# Patient Record
Sex: FEMALE | Race: WHITE | HISPANIC OR LATINO | Employment: OTHER | URBAN - METROPOLITAN AREA
[De-identification: names, ages, dates, MRNs, and addresses within clinical notes are randomized per-mention and may not be internally consistent; named-entity substitution may affect disease eponyms.]

---

## 2017-07-01 ENCOUNTER — HOSPITAL ENCOUNTER (EMERGENCY)
Facility: HOSPITAL | Age: 77
Discharge: HOME | End: 2017-07-01
Attending: EMERGENCY MEDICINE
Payer: MEDICARE

## 2017-07-01 VITALS
RESPIRATION RATE: 16 BRPM | DIASTOLIC BLOOD PRESSURE: 83 MMHG | OXYGEN SATURATION: 99 % | HEIGHT: 66 IN | HEART RATE: 62 BPM | SYSTOLIC BLOOD PRESSURE: 185 MMHG | WEIGHT: 242 LBS | TEMPERATURE: 99 F | BODY MASS INDEX: 38.89 KG/M2

## 2017-07-01 DIAGNOSIS — L29.9 ITCHING: ICD-10-CM

## 2017-07-01 DIAGNOSIS — L30.9 DERMATITIS: Primary | ICD-10-CM

## 2017-07-01 LAB
ALBUMIN SERPL BCP-MCNC: 3.5 G/DL
ALP SERPL-CCNC: 62 U/L
ALT SERPL W/O P-5'-P-CCNC: 21 U/L
ANION GAP SERPL CALC-SCNC: 8 MMOL/L
AST SERPL-CCNC: 22 U/L
BASOPHILS # BLD AUTO: 0.04 K/UL
BASOPHILS NFR BLD: 0.6 %
BILIRUB SERPL-MCNC: 0.4 MG/DL
BUN SERPL-MCNC: 11 MG/DL
CALCIUM SERPL-MCNC: 9.1 MG/DL
CHLORIDE SERPL-SCNC: 101 MMOL/L
CO2 SERPL-SCNC: 30 MMOL/L
CREAT SERPL-MCNC: 0.8 MG/DL
DIFFERENTIAL METHOD: NORMAL
EOSINOPHIL # BLD AUTO: 0.1 K/UL
EOSINOPHIL NFR BLD: 1.6 %
ERYTHROCYTE [DISTWIDTH] IN BLOOD BY AUTOMATED COUNT: 13.2 %
EST. GFR  (AFRICAN AMERICAN): >60 ML/MIN/1.73 M^2
EST. GFR  (NON AFRICAN AMERICAN): >60 ML/MIN/1.73 M^2
GLUCOSE SERPL-MCNC: 92 MG/DL
HCT VFR BLD AUTO: 38.6 %
HGB BLD-MCNC: 12.5 G/DL
LYMPHOCYTES # BLD AUTO: 2.2 K/UL
LYMPHOCYTES NFR BLD: 34.7 %
MCH RBC QN AUTO: 27.5 PG
MCHC RBC AUTO-ENTMCNC: 32.4 %
MCV RBC AUTO: 85 FL
MONOCYTES # BLD AUTO: 0.4 K/UL
MONOCYTES NFR BLD: 6 %
NEUTROPHILS # BLD AUTO: 3.6 K/UL
NEUTROPHILS NFR BLD: 57.1 %
PLATELET # BLD AUTO: 278 K/UL
PMV BLD AUTO: 10.4 FL
POTASSIUM SERPL-SCNC: 4.3 MMOL/L
PROT SERPL-MCNC: 7.2 G/DL
RBC # BLD AUTO: 4.54 M/UL
SODIUM SERPL-SCNC: 139 MMOL/L
WBC # BLD AUTO: 6.29 K/UL

## 2017-07-01 PROCEDURE — 25000003 PHARM REV CODE 250: Performed by: EMERGENCY MEDICINE

## 2017-07-01 PROCEDURE — 80053 COMPREHEN METABOLIC PANEL: CPT

## 2017-07-01 PROCEDURE — 63600175 PHARM REV CODE 636 W HCPCS: Performed by: EMERGENCY MEDICINE

## 2017-07-01 PROCEDURE — 99284 EMERGENCY DEPT VISIT MOD MDM: CPT

## 2017-07-01 PROCEDURE — 85025 COMPLETE CBC W/AUTO DIFF WBC: CPT

## 2017-07-01 RX ORDER — DIPHENHYDRAMINE HCL 25 MG
25 CAPSULE ORAL
Status: COMPLETED | OUTPATIENT
Start: 2017-07-01 | End: 2017-07-01

## 2017-07-01 RX ORDER — PREDNISONE 20 MG/1
40 TABLET ORAL
Status: COMPLETED | OUTPATIENT
Start: 2017-07-01 | End: 2017-07-01

## 2017-07-01 RX ORDER — HYDROXYZINE HYDROCHLORIDE 25 MG/1
25 TABLET, FILM COATED ORAL EVERY 8 HOURS PRN
Qty: 10 TABLET | Refills: 0 | Status: ON HOLD | OUTPATIENT
Start: 2017-07-01 | End: 2022-03-09 | Stop reason: CLARIF

## 2017-07-01 RX ORDER — PREDNISONE 20 MG/1
40 TABLET ORAL DAILY
Qty: 8 TABLET | Refills: 0 | Status: SHIPPED | OUTPATIENT
Start: 2017-07-01 | End: 2017-07-05

## 2017-07-01 RX ORDER — LOTEPREDNOL ETABONATE 2 MG/ML
1 SUSPENSION/ DROPS OPHTHALMIC 4 TIMES DAILY
Status: ON HOLD | COMMUNITY
End: 2022-03-09 | Stop reason: CLARIF

## 2017-07-01 RX ORDER — LORATADINE 10 MG/1
10 TABLET ORAL DAILY
Qty: 15 TABLET | Refills: 0 | OUTPATIENT
Start: 2017-07-01 | End: 2022-01-12

## 2017-07-01 RX ADMIN — DIPHENHYDRAMINE HYDROCHLORIDE 25 MG: 25 CAPSULE ORAL at 11:07

## 2017-07-01 RX ADMIN — PREDNISONE 40 MG: 20 TABLET ORAL at 11:07

## 2017-07-01 NOTE — DISCHARGE INSTRUCTIONS
Please follow-up with your PCP next week.  Return immediately for new or worsening symptoms such as difficulty breathing, swelling in your mouth/ throat.

## 2017-07-01 NOTE — ED TRIAGE NOTES
"Pt arrived via personal transportation from home. CC of itching. Pt stated "I get really itchy and it makes me hot." pt presents with generalized itching to body. Pt reports getting really itchy and hot, pt also stated that she gets red, watery eyes. Pt denies N/V/F/D/SOB. NAD at this time.  "

## 2017-07-01 NOTE — ED PROVIDER NOTES
"Encounter Date: 7/1/2017    SCRIBE #1 NOTE: I, Barbara Mendestorin, am scribing for, and in the presence of,  Mc Gates MD. I have scribed the following portions of the note - Other sections scribed: ROS and HPI.       History     Chief Complaint   Patient presents with    Itching     [place]ian speaking only. "my eyes are red and burning, I keep itching all over my body, pain to my body. My body is hot but I don't have fever." No new meds, no new foods. No SOB.       CC: Itching  HPI: This 76 y.o. female with a past medical history of Chronic back pain; High cholesterol; Hypertension; and Spinal stenosis, presents to the ED complaining of itching all over her body, mainly to her sternal/xyphoid area with associated erythema for last several days. No associated swelling. No new medication intake or new food or new detergent or soap use is reported. She denies fever, chills, SOB or any other associated sx. No prior similar episodes.      The history is provided by the patient. A  was used ([place]ian speaking patient).     Review of patient's allergies indicates:  No Known Allergies  Past Medical History:   Diagnosis Date    Chronic back pain     High cholesterol     Hypertension     Spinal stenosis      Past Surgical History:   Procedure Laterality Date    TUBAL LIGATION      Uterine suspension       Family History   Problem Relation Age of Onset    Breast cancer Neg Hx     Colon cancer Neg Hx     Ovarian cancer Neg Hx      Social History   Substance Use Topics    Smoking status: Never Smoker    Smokeless tobacco: Never Used    Alcohol use No     Review of Systems   Constitutional: Negative for fever.   HENT: Negative for congestion.    Eyes: Negative for visual disturbance.   Respiratory: Negative for cough and shortness of breath.    Cardiovascular: Negative for chest pain.   Gastrointestinal: Negative for abdominal pain, diarrhea, nausea and vomiting.   Genitourinary: Negative for " "dysuria.   Musculoskeletal: Negative for back pain.   Skin: Positive for color change (erythema to xyphoid region). Negative for rash.        (+) "itch all over"   Neurological: Negative for headaches.       Physical Exam     Initial Vitals [07/01/17 1105]   BP Pulse Resp Temp SpO2   (!) 183/82 73 16 98.9 °F (37.2 °C) 100 %      MAP       115.67         Physical Exam    Nursing note and vitals reviewed.  Constitutional: She appears well-developed and well-nourished. She is not diaphoretic. No distress.   HENT:   Head: Normocephalic and atraumatic.   Right Ear: External ear normal.   Left Ear: External ear normal.   Nose: Nose normal.   Mouth/Throat: Oropharynx is clear and moist.    Scalp appears normal.   Eyes: Conjunctivae and EOM are normal. Pupils are equal, round, and reactive to light. Right eye exhibits no discharge. Left eye exhibits no discharge. No scleral icterus.   Neck: Normal range of motion. Neck supple.   Cardiovascular: Normal rate, regular rhythm, normal heart sounds and intact distal pulses.   No murmur heard.  Pulmonary/Chest: Breath sounds normal. No respiratory distress. She has no wheezes. She has no rhonchi. She has no rales.   Abdominal: Soft. Bowel sounds are normal. She exhibits no distension and no mass. There is no tenderness. There is no rebound and no guarding.   Musculoskeletal: Normal range of motion. She exhibits no edema or tenderness.   Neurological: She is alert and oriented to person, place, and time. She has normal strength. No cranial nerve deficit or sensory deficit.   Skin: Skin is warm and dry. No rash noted. There is erythema. No pallor.   Small area of nonspecific erythema near the xiphoid process.   Psychiatric: She has a normal mood and affect. Her behavior is normal. Judgment and thought content normal.         ED Course   Procedures  Labs Reviewed   COMPREHENSIVE METABOLIC PANEL - Abnormal; Notable for the following:        Result Value    CO2 30 (*)     All other " components within normal limits   CBC W/ AUTO DIFFERENTIAL             Medical Decision Making:   ED Management:  This is the emergent evaluation of a 76-year-old female presents the emergency department complaining of itching all over her skin.Differential diagnosis at the time of initial evaluation included, but was not limited to:  Histamine reaction, contact dermatitis, medication reaction, environmental exposure causing ALLERGY. there is a small area of erythema near the xiphoid process.  Otherwise, no significant evidence of skin lesions.  No mucous membrane lesions.  The patient appears well.  No evidence of head lice, or signs of scabies or other infestation.  Patient feeling better after Benadryl.  I will treat this patient with prednisone, Claritin, hydroxyzine.  Unclear what is causing her symptoms but her daughter states that she normally takes Claritin or ALLERGIES but has not been taking it recently.  There is no evidence to suggest anaphylaxis or Booth-Roddy syndrome.  I believe the patient is safe and stable for discharge.  I advised her to follow-up with her PCP and return for any new or worsening symptoms such as shortness of breath or oral swelling.            Scribe Attestation:   Scribe #1: I performed the above scribed service and the documentation accurately describes the services I performed. I attest to the accuracy of the note.    Attending Attestation:           Physician Attestation for Scribe:  Physician Attestation Statement for Scribe #1: I, Mc Gates MD, reviewed documentation, as scribed by Barbara Meredith in my presence, and it is both accurate and complete.                 ED Course     Clinical Impression:   The primary encounter diagnosis was Dermatitis. A diagnosis of Itching was also pertinent to this visit.                           Mc Gates Jr., MD  07/01/17 8868

## 2017-09-01 ENCOUNTER — HOSPITAL ENCOUNTER (EMERGENCY)
Facility: HOSPITAL | Age: 77
Discharge: HOME | End: 2017-09-01
Attending: EMERGENCY MEDICINE
Payer: COMMERCIAL

## 2017-09-01 VITALS
RESPIRATION RATE: 18 BRPM | TEMPERATURE: 99 F | SYSTOLIC BLOOD PRESSURE: 158 MMHG | OXYGEN SATURATION: 99 % | BODY MASS INDEX: 38.14 KG/M2 | DIASTOLIC BLOOD PRESSURE: 80 MMHG | HEART RATE: 62 BPM | HEIGHT: 67 IN | WEIGHT: 243 LBS

## 2017-09-01 DIAGNOSIS — S20.212A RIB CONTUSION, LEFT, INITIAL ENCOUNTER: Primary | ICD-10-CM

## 2017-09-01 DIAGNOSIS — R07.81 RIB PAIN: ICD-10-CM

## 2017-09-01 PROCEDURE — 99284 EMERGENCY DEPT VISIT MOD MDM: CPT | Mod: 25

## 2017-09-01 PROCEDURE — 63600175 PHARM REV CODE 636 W HCPCS: Performed by: EMERGENCY MEDICINE

## 2017-09-01 PROCEDURE — 25000003 PHARM REV CODE 250: Performed by: EMERGENCY MEDICINE

## 2017-09-01 PROCEDURE — 96372 THER/PROPH/DIAG INJ SC/IM: CPT

## 2017-09-01 RX ORDER — HYDROCODONE BITARTRATE AND ACETAMINOPHEN 5; 325 MG/1; MG/1
2 TABLET ORAL
Status: COMPLETED | OUTPATIENT
Start: 2017-09-01 | End: 2017-09-01

## 2017-09-01 RX ORDER — HYDROCODONE BITARTRATE AND ACETAMINOPHEN 5; 325 MG/1; MG/1
1 TABLET ORAL EVERY 6 HOURS PRN
Qty: 8 TABLET | Refills: 0 | Status: SHIPPED | OUTPATIENT
Start: 2017-09-01 | End: 2017-09-03 | Stop reason: SDUPTHER

## 2017-09-01 RX ORDER — NAPROXEN 500 MG/1
500 TABLET ORAL 2 TIMES DAILY WITH MEALS
Qty: 20 TABLET | Refills: 0 | Status: ON HOLD | OUTPATIENT
Start: 2017-09-01 | End: 2022-03-09 | Stop reason: CLARIF

## 2017-09-01 RX ORDER — LIDOCAINE 50 MG/G
1 PATCH TOPICAL DAILY
Qty: 20 PATCH | Refills: 0 | Status: ON HOLD | OUTPATIENT
Start: 2017-09-01 | End: 2022-03-09 | Stop reason: CLARIF

## 2017-09-01 RX ORDER — KETOROLAC TROMETHAMINE 30 MG/ML
15 INJECTION, SOLUTION INTRAMUSCULAR; INTRAVENOUS
Status: COMPLETED | OUTPATIENT
Start: 2017-09-01 | End: 2017-09-01

## 2017-09-01 RX ADMIN — HYDROCODONE BITARTRATE AND ACETAMINOPHEN 2 TABLET: 5; 325 TABLET ORAL at 12:09

## 2017-09-01 RX ADMIN — KETOROLAC TROMETHAMINE 15 MG: 30 INJECTION, SOLUTION INTRAMUSCULAR at 12:09

## 2017-09-01 NOTE — ED PROVIDER NOTES
Encounter Date: 9/1/2017    SCRIBE #1 NOTE: I, Elle East, am scribing for, and in the presence of,  Mc Gates MD. I have scribed the following portions of the note - Other sections scribed: HPI and ROS.       History     Chief Complaint   Patient presents with    Fall     trip and fall leaving the dentist, fell head first. no LOC    Rib Injury     left side       Chief Complaint: Fall; Rib Injury    HPI: This 76 y.o. Female with HTN, spinal stenosis and high cholesterol presents to the ED status post a fall at the dentist office this afternoon. Patient reports tripping and falling over a rug while walking into facility. Patient fell forwards and hit the floor. She reports hitting left side rib cage during incident. Pain is constant, severe and worse with palpation. So also reports associated SOB. There's no attempted treatment. She denies LOC, head trauma, headache, chest pain or abdominal pain.       The history is provided by the patient and a caregiver. A  was used.     Review of patient's allergies indicates:  No Known Allergies  Past Medical History:   Diagnosis Date    Chronic back pain     High cholesterol     Hypertension     Spinal stenosis      Past Surgical History:   Procedure Laterality Date    TUBAL LIGATION      Uterine suspension       Family History   Problem Relation Age of Onset    Breast cancer Neg Hx     Colon cancer Neg Hx     Ovarian cancer Neg Hx      Social History   Substance Use Topics    Smoking status: Never Smoker    Smokeless tobacco: Never Used    Alcohol use No     Review of Systems   Constitutional: Negative for chills and fever.   HENT: Negative for ear pain and sore throat.    Eyes: Negative for pain.   Respiratory: Positive for shortness of breath. Negative for cough.    Cardiovascular: Negative for chest pain.   Gastrointestinal: Negative for abdominal pain, diarrhea, nausea and vomiting.   Genitourinary: Negative for dysuria and  hematuria.   Musculoskeletal: Negative for myalgias (arm or leg pain).        (+) rib pain   Skin: Negative for rash.   Neurological: Negative for syncope and headaches.       Physical Exam     Initial Vitals [09/01/17 1218]   BP Pulse Resp Temp SpO2   (!) 180/77 70 20 98.2 °F (36.8 °C) 98 %      MAP       111.33         Physical Exam    Nursing note and vitals reviewed.  Constitutional: She appears well-developed and well-nourished. She is not diaphoretic. No distress.   HENT:   Head: Normocephalic and atraumatic.   Right Ear: External ear normal.   Left Ear: External ear normal.   Nose: Nose normal.   Mouth/Throat: Oropharynx is clear and moist.   Eyes: Conjunctivae and EOM are normal. Pupils are equal, round, and reactive to light. Right eye exhibits no discharge. Left eye exhibits no discharge. No scleral icterus.   Neck: Normal range of motion. Neck supple.   Cardiovascular: Normal rate, regular rhythm, normal heart sounds and intact distal pulses.   No murmur heard.  Pulmonary/Chest: Breath sounds normal. No respiratory distress. She has no wheezes. She has no rhonchi. She has no rales. She exhibits tenderness.   No respiratory distress.  Breath sounds clear bilaterally.  There is pronounced chest wall tenderness without evidence of crepitus or flail chest in the posterior inferior left-sided ribs.   Abdominal: Soft. Bowel sounds are normal. She exhibits no distension and no mass. There is no tenderness. There is no rebound and no guarding.   Abdomen soft, nontender.   Musculoskeletal: Normal range of motion. She exhibits no edema or tenderness.   Neurological: She is alert and oriented to person, place, and time. She has normal strength. No cranial nerve deficit or sensory deficit.   Skin: Skin is warm and dry. No rash noted. No erythema. No pallor.   Psychiatric: She has a normal mood and affect. Her behavior is normal. Judgment and thought content normal.         ED Course   Procedures  Labs Reviewed - No  data to display       X-Rays:   Independently Interpreted Readings:   Other Readings:  Chest x-ray reviewed and interpreted by me shows no evidence of pulmonary edema, pneumothorax, or consolidations/ infiltrates.  X-ray of the left ribs reveals no evidence of rib fracture.    Medical Decision Making:   ED Management:  This is the emergent evaluation of a 76-year-old female presents the emergency department with left-sided rib pain after mechanical fall this prior to arrival.Differential diagnosis at the time of initial evaluation included, but was not limited to: Rib fracture, pneumothorax, pulmonary contusion.  I considered intra-abdominal and splenic injury.  However, patient's abdomen is soft and nontender.  Lungs sounds are clear bilaterally.  No evidence of rib fractures on chest x-ray.  No evidence of pulmonary contusions or pneumothorax.  Patient is safe and stable to go home with lidocaine patches, NSAIDs, and Norco for breakthrough pain.  She was advised to follow with PCP and return for new or worsening symptoms such as fever, shortness of breath.            Scribe Attestation:   Scribe #1: I performed the above scribed service and the documentation accurately describes the services I performed. I attest to the accuracy of the note.    Attending Attestation:           Physician Attestation for Scribe:  Physician Attestation Statement for Scribe #1: I, Mc Gates MD, reviewed documentation, as scribed by Elle East in my presence, and it is both accurate and complete.                 ED Course      Clinical Impression:   The primary encounter diagnosis was Rib contusion, left, initial encounter. A diagnosis of Rib pain was also pertinent to this visit.                           Mc Gates Jr., MD  09/01/17 1640

## 2017-09-01 NOTE — ED TRIAGE NOTES
"Pt presents to ED via EMS states that she was at dentist office after procedure she tripped on the carpet and fell head first.  Pt denies hitting head or side.  State that she just fell to the floor.  States that Lt side "ribs" hurt.  States that when deep breath more painful.   Pt rates pain as 10.  "

## 2017-09-03 ENCOUNTER — HOSPITAL ENCOUNTER (EMERGENCY)
Facility: HOSPITAL | Age: 77
Discharge: HOME | End: 2017-09-03
Attending: EMERGENCY MEDICINE
Payer: COMMERCIAL

## 2017-09-03 VITALS
TEMPERATURE: 98 F | RESPIRATION RATE: 18 BRPM | OXYGEN SATURATION: 97 % | HEART RATE: 52 BPM | SYSTOLIC BLOOD PRESSURE: 180 MMHG | WEIGHT: 243 LBS | BODY MASS INDEX: 38.14 KG/M2 | DIASTOLIC BLOOD PRESSURE: 84 MMHG | HEIGHT: 67 IN

## 2017-09-03 DIAGNOSIS — S22.32XA CLOSED FRACTURE OF ONE RIB OF LEFT SIDE, INITIAL ENCOUNTER: Primary | ICD-10-CM

## 2017-09-03 DIAGNOSIS — I10 HYPERTENSION, UNCONTROLLED: ICD-10-CM

## 2017-09-03 DIAGNOSIS — R07.9 CHEST PAIN: ICD-10-CM

## 2017-09-03 LAB
ALBUMIN SERPL BCP-MCNC: 3.5 G/DL
ALP SERPL-CCNC: 62 U/L
ALT SERPL W/O P-5'-P-CCNC: 14 U/L
ANION GAP SERPL CALC-SCNC: 9 MMOL/L
AST SERPL-CCNC: 17 U/L
BASOPHILS # BLD AUTO: 0.04 K/UL
BASOPHILS NFR BLD: 0.5 %
BILIRUB SERPL-MCNC: 0.4 MG/DL
BUN SERPL-MCNC: 15 MG/DL
CALCIUM SERPL-MCNC: 9.4 MG/DL
CHLORIDE SERPL-SCNC: 106 MMOL/L
CO2 SERPL-SCNC: 26 MMOL/L
CREAT SERPL-MCNC: 0.7 MG/DL
DIFFERENTIAL METHOD: ABNORMAL
EOSINOPHIL # BLD AUTO: 0.1 K/UL
EOSINOPHIL NFR BLD: 1.1 %
ERYTHROCYTE [DISTWIDTH] IN BLOOD BY AUTOMATED COUNT: 13.8 %
EST. GFR  (AFRICAN AMERICAN): >60 ML/MIN/1.73 M^2
EST. GFR  (NON AFRICAN AMERICAN): >60 ML/MIN/1.73 M^2
GLUCOSE SERPL-MCNC: 104 MG/DL
HCT VFR BLD AUTO: 37.3 %
HGB BLD-MCNC: 11.9 G/DL
LYMPHOCYTES # BLD AUTO: 2.2 K/UL
LYMPHOCYTES NFR BLD: 26.1 %
MCH RBC QN AUTO: 27.2 PG
MCHC RBC AUTO-ENTMCNC: 31.9 G/DL
MCV RBC AUTO: 85 FL
MONOCYTES # BLD AUTO: 0.5 K/UL
MONOCYTES NFR BLD: 5.4 %
NEUTROPHILS # BLD AUTO: 5.5 K/UL
NEUTROPHILS NFR BLD: 66.4 %
PLATELET # BLD AUTO: 312 K/UL
PMV BLD AUTO: 9.9 FL
POTASSIUM SERPL-SCNC: 4.2 MMOL/L
PROT SERPL-MCNC: 7.1 G/DL
RBC # BLD AUTO: 4.38 M/UL
SODIUM SERPL-SCNC: 141 MMOL/L
WBC # BLD AUTO: 8.31 K/UL

## 2017-09-03 PROCEDURE — 80053 COMPREHEN METABOLIC PANEL: CPT

## 2017-09-03 PROCEDURE — 99284 EMERGENCY DEPT VISIT MOD MDM: CPT | Mod: 25

## 2017-09-03 PROCEDURE — 96375 TX/PRO/DX INJ NEW DRUG ADDON: CPT

## 2017-09-03 PROCEDURE — 85025 COMPLETE CBC W/AUTO DIFF WBC: CPT

## 2017-09-03 PROCEDURE — 96374 THER/PROPH/DIAG INJ IV PUSH: CPT

## 2017-09-03 PROCEDURE — 63600175 PHARM REV CODE 636 W HCPCS: Performed by: EMERGENCY MEDICINE

## 2017-09-03 RX ORDER — ONDANSETRON 2 MG/ML
4 INJECTION INTRAMUSCULAR; INTRAVENOUS
Status: COMPLETED | OUTPATIENT
Start: 2017-09-03 | End: 2017-09-03

## 2017-09-03 RX ORDER — HYDROMORPHONE HYDROCHLORIDE 2 MG/ML
1 INJECTION, SOLUTION INTRAMUSCULAR; INTRAVENOUS; SUBCUTANEOUS
Status: COMPLETED | OUTPATIENT
Start: 2017-09-03 | End: 2017-09-03

## 2017-09-03 RX ORDER — OXYCODONE AND ACETAMINOPHEN 7.5; 325 MG/1; MG/1
1 TABLET ORAL EVERY 6 HOURS PRN
Qty: 30 TABLET | Refills: 0 | Status: ON HOLD | OUTPATIENT
Start: 2017-09-03 | End: 2022-03-09 | Stop reason: CLARIF

## 2017-09-03 RX ADMIN — ONDANSETRON 4 MG: 2 INJECTION INTRAMUSCULAR; INTRAVENOUS at 09:09

## 2017-09-03 RX ADMIN — HYDROMORPHONE HYDROCHLORIDE 1 MG: 2 INJECTION INTRAMUSCULAR; INTRAVENOUS; SUBCUTANEOUS at 09:09

## 2017-09-03 NOTE — ED PROVIDER NOTES
Encounter Date: 9/3/2017    SCRIBE #1 NOTE: I, Vivian Segovia, am scribing for, and in the presence of,  Asael Franklin MD. I have scribed the following portions of the note - Other sections scribed: HPI and ROS.       History     Chief Complaint   Patient presents with    Fall     fall x 2 days ago.  remains with complaints of pain to left ribs with movement or deep breaths.       CC: Rib Pain    HPI: This 76 y.o. Female with PMHx of HTN, chronic back pain, spinal stenosis, high cholesterol, and thyroid disease presents to the ED c/o severe left rib pain with movement and/or deep breaths secondary to a fall that occurred 2 days ago. Pt was in this ED approximately 2 days ago following the fall, and she was evaluated, treated, prescribed hydrocodone for treatment, and discharged. Pt denies any other associated symptoms.       The history is provided by the patient. The history is limited by a language barrier. A  was used ([employer] (Italian) ).     Review of patient's allergies indicates:  No Known Allergies  Past Medical History:   Diagnosis Date    Chronic back pain     High cholesterol     Hypertension     Spinal stenosis     Thyroid disease      Past Surgical History:   Procedure Laterality Date    EYE SURGERY      TUBAL LIGATION      Uterine suspension       Family History   Problem Relation Age of Onset    Breast cancer Neg Hx     Colon cancer Neg Hx     Ovarian cancer Neg Hx      Social History   Substance Use Topics    Smoking status: Never Smoker    Smokeless tobacco: Never Used    Alcohol use No     Review of Systems   Constitutional: Negative for chills, diaphoresis and fever.   HENT: Negative for ear pain and sore throat.    Eyes: Negative for pain.   Respiratory: Negative for cough and shortness of breath.    Cardiovascular:        (+) Severe left rib pain.    Gastrointestinal: Negative for abdominal pain, diarrhea, nausea and vomiting.   Genitourinary: Negative for  dysuria.   Musculoskeletal: Negative for neck pain.   Skin: Negative for rash.   Neurological: Negative for headaches.       Physical Exam     Initial Vitals [09/03/17 0755]   BP Pulse Resp Temp SpO2   (!) 198/89 61 18 98.7 °F (37.1 °C) 98 %      MAP       125.33         Physical Exam    Nursing note and vitals reviewed.  Constitutional: She appears well-developed and well-nourished.   Eyes: EOM are normal. Pupils are equal, round, and reactive to light.   Neck: Normal range of motion. Neck supple. No thyromegaly present. No JVD present.   Cardiovascular: Normal rate, regular rhythm, normal heart sounds and intact distal pulses. Exam reveals no gallop and no friction rub.    No murmur heard.  Pulmonary/Chest: Breath sounds normal. No respiratory distress. She has no wheezes. She has no rhonchi. She has no rales. She exhibits tenderness.   Abdominal: Soft. Bowel sounds are normal. She exhibits no distension and no mass. There is no tenderness. There is no rebound and no guarding.   Musculoskeletal: Normal range of motion. She exhibits no edema or tenderness.   Neurological: She is alert and oriented to person, place, and time. She has normal strength. She displays normal reflexes. No cranial nerve deficit or sensory deficit.   Skin: Skin is warm and dry. Capillary refill takes less than 2 seconds.         ED Course   Procedures  Labs Reviewed   CBC W/ AUTO DIFFERENTIAL - Abnormal; Notable for the following:        Result Value    Hemoglobin 11.9 (*)     MCHC 31.9 (*)     All other components within normal limits   COMPREHENSIVE METABOLIC PANEL          X-Rays:   Independently Interpreted Readings:   Other Readings:  CT chest shows 5th rib fracture. Small pleural effusion and some atelectasis. No PTX.   Patient's pain is controlled at this time. Incentive spirometer performed and will be given for home use. Will prescribe pain medication. Follow up PCP for Blood pressure recheck and further pain control.               Scribe Attestation:   Scribe #1: I performed the above scribed service and the documentation accurately describes the services I performed. I attest to the accuracy of the note.    Attending Attestation:           Physician Attestation for Scribe:  Physician Attestation Statement for Scribe #1: I, Asael Franklin MD, reviewed documentation, as scribed by Vivian Segovia in my presence, and it is both accurate and complete.                 ED Course      Clinical Impression:   The primary encounter diagnosis was Closed fracture of one rib of left side, initial encounter. Diagnoses of Chest pain and Hypertension, uncontrolled were also pertinent to this visit.                           Asael Franklin MD  09/03/17 1129

## 2017-09-03 NOTE — ED TRIAGE NOTES
Pt comes in after trip and fall at dentist office on Friday. She tripped over a rug.  Pt complains of left shoulder pain, left chest pain radiating to back. Pt also complains of trouble sleeping x 2 days due to the pain. She was seen here after the fall Friday. Pt denies hitting head or LOC. Pt denies abdominal pain, nausea, or vomiting. Pt also complains of chronic leg pain. Pt also complains of pain when breathing.

## 2022-01-12 ENCOUNTER — HOSPITAL ENCOUNTER (EMERGENCY)
Facility: HOSPITAL | Age: 82
Discharge: HOME | End: 2022-01-12
Attending: EMERGENCY MEDICINE
Payer: MEDICARE

## 2022-01-12 VITALS
OXYGEN SATURATION: 99 % | HEIGHT: 66 IN | HEART RATE: 91 BPM | RESPIRATION RATE: 18 BRPM | BODY MASS INDEX: 40.18 KG/M2 | DIASTOLIC BLOOD PRESSURE: 76 MMHG | TEMPERATURE: 98 F | SYSTOLIC BLOOD PRESSURE: 118 MMHG | WEIGHT: 250 LBS

## 2022-01-12 DIAGNOSIS — R05.9 COUGH: ICD-10-CM

## 2022-01-12 DIAGNOSIS — L28.2 PRURITIC RASH: Primary | ICD-10-CM

## 2022-01-12 LAB
SARS-COV-2 RNA RESP QL NAA+PROBE: NOT DETECTED
SARS-COV-2- CYCLE NUMBER: NORMAL

## 2022-01-12 PROCEDURE — U0003 INFECTIOUS AGENT DETECTION BY NUCLEIC ACID (DNA OR RNA); SEVERE ACUTE RESPIRATORY SYNDROME CORONAVIRUS 2 (SARS-COV-2) (CORONAVIRUS DISEASE [COVID-19]), AMPLIFIED PROBE TECHNIQUE, MAKING USE OF HIGH THROUGHPUT TECHNOLOGIES AS DESCRIBED BY CMS-2020-01-R: HCPCS | Performed by: NURSE PRACTITIONER

## 2022-01-12 PROCEDURE — 99284 EMERGENCY DEPT VISIT MOD MDM: CPT | Mod: 25

## 2022-01-12 PROCEDURE — U0005 INFEC AGEN DETEC AMPLI PROBE: HCPCS | Performed by: NURSE PRACTITIONER

## 2022-01-12 RX ORDER — CETIRIZINE HYDROCHLORIDE 10 MG/1
10 TABLET ORAL DAILY
Qty: 30 TABLET | Refills: 1 | Status: ON HOLD | OUTPATIENT
Start: 2022-01-12 | End: 2022-03-09 | Stop reason: CLARIF

## 2022-01-12 RX ORDER — HYDROCORTISONE 25 MG/ML
LOTION TOPICAL 2 TIMES DAILY
Qty: 60 ML | Refills: 0 | Status: ON HOLD | OUTPATIENT
Start: 2022-01-12 | End: 2022-03-09 | Stop reason: CLARIF

## 2022-01-12 NOTE — DISCHARGE INSTRUCTIONS
Take zyrtec once daily to help reduce sneezing and coughing.  You can take zyrtec before bedtime.    Use the hydrocortisone cream on the rash on your face.  Use up to twice daily, as needed.    Follow-up with your primary care doctor for further evaluation management of your symptoms in 1 week.    Return to the emergency room for any new or worsening symptoms or concerns.

## 2022-01-12 NOTE — ED PROVIDER NOTES
"Encounter Date: 1/12/2022    SCRIBE #1 NOTE: I, Farnaz Munoz, am scribing for, and in the presence of,  Edel Lawler NP. I have scribed the following portions of the note - Other sections scribed: HPI, ROS,PE.       History     Chief Complaint   Patient presents with    Cough     Patient reports a cough x 1 month, dry, itchy skin on her face x "a long time". Denies chest pain or sob at this time. Denies fevers, chills, vomiting, diarrhea.    dry skin     Amy Schreiber is a 81 y.o. female who presents to the ED today with a complaint of a productive cough for 1 month. The patient states that her cough mostly happens at night and feels pain under her breast and in her chest when coughing so much (reports no pain now). She reports associated rash on her face and intermittent sneezing. She denies ear pain, sore throat, nausea, vomiting, diarrhea, congestion, chest pain, abdominal pain, or other associated symptoms. She reports taking Tessalon pearls for cough prescribed from her PCP prior to arrival with no relief. She also reports being prescribed 2.5% hydrocortisone for her rash that gave her relief but ran out. She notes that after she ran out of hydrocortisone she was prescribed Clotrimazole and had no relief. She reports seeing her PCP last month but only got blood work done. No other complaints at this time.    The history is provided by the patient. The history is limited by a language barrier. A  was used.     Review of patient's allergies indicates:  No Known Allergies  Past Medical History:   Diagnosis Date    Arthritis     Chronic back pain     High cholesterol     Hypertension     Spinal stenosis     Thyroid disease      Past Surgical History:   Procedure Laterality Date    EYE SURGERY      TUBAL LIGATION      Uterine suspension       Family History   Problem Relation Age of Onset    Breast cancer Neg Hx     Colon cancer Neg Hx     Ovarian cancer Neg Hx      Social " History     Tobacco Use    Smoking status: Never Smoker    Smokeless tobacco: Never Used   Substance Use Topics    Alcohol use: No    Drug use: No     Review of Systems   Constitutional: Negative for chills and fever.   HENT: Positive for sneezing (intermittent). Negative for congestion, ear pain and sore throat.    Eyes: Negative for visual disturbance.   Respiratory: Positive for cough (productive). Negative for shortness of breath.    Cardiovascular: Negative for chest pain.   Gastrointestinal: Negative for abdominal distention, abdominal pain, diarrhea, nausea and vomiting.   Genitourinary: Negative for dysuria.   Musculoskeletal: Negative for neck pain.   Skin: Positive for rash (face).   Allergic/Immunologic: Negative for immunocompromised state.   Neurological: Negative for headaches.   Psychiatric/Behavioral: Negative for dysphoric mood.       Physical Exam     Initial Vitals [01/12/22 0918]   BP Pulse Resp Temp SpO2   118/76 91 18 98 °F (36.7 °C) 99 %      MAP       --         Physical Exam    Nursing note and vitals reviewed.  Constitutional: Vital signs are normal. She appears well-developed and well-nourished. She is not diaphoretic. She is active and cooperative.  Non-toxic appearance. No distress.   HENT:   Mouth/Throat: Uvula is midline, oropharynx is clear and moist and mucous membranes are normal. No trismus in the jaw. No uvula swelling. No oropharyngeal exudate, posterior oropharyngeal edema or posterior oropharyngeal erythema.   Unremarkable.   Eyes: Conjunctivae and EOM are normal. Pupils are equal, round, and reactive to light.   Neck: Neck supple.   Normal range of motion.   Full passive range of motion without pain.     Cardiovascular: Normal heart sounds.   Pulmonary/Chest: Breath sounds normal. No respiratory distress. She has no decreased breath sounds. She has no wheezes. She has no rhonchi. She has no rales. She exhibits no tenderness.   Lungs clear bilaterally.   Musculoskeletal:          General: Normal range of motion.      Cervical back: Full passive range of motion without pain, normal range of motion and neck supple.     Neurological: She is alert and oriented to person, place, and time. She has normal strength.   Skin: Skin is warm and dry. Rash noted.   Papular erythematous rash scattered over face with large concentration over chin and forehead.   Psychiatric: She has a normal mood and affect.         ED Course   Procedures  Labs Reviewed   SARS-COV-2 (COVID-19) QUALITATIVE PCR          Imaging Results          X-Ray Chest AP Portable (Final result)  Result time 01/12/22 10:38:14    Final result by Paul Young Jr., MD (01/12/22 10:38:14)                 Impression:      Cardiomegaly and atherosclerosis.  Evidence of previous granulomatous disease      Electronically signed by: Paul Michel Jr  Date:    01/12/2022  Time:    10:38             Narrative:    EXAMINATION:  XR CHEST AP PORTABLE    CLINICAL HISTORY:  Cough, unspecified    TECHNIQUE:  Single frontal view of the chest was performed.    COMPARISON:  09/01/2017    FINDINGS:  Cardiomediastinal silhouetteis enlarged.  There is tortuosity and atherosclerosis of the thoracic aorta.    There is a calcified granuloma in the left mid lung.  Calcified left hilar lymph nodes also suspected.  Lungs grossly clear within limitations of portable technique noting significant underpenetration due to body habitus.    Pleura, diaphragm, and thoracic cage grossly unremarkable.                              X-Rays:   Independently Interpreted Readings:   Chest X-Ray: No infiltrates.  No acute abnormalities.     Medications - No data to display  Medical Decision Making:   Clinical Tests:   Radiological Study: Ordered and Reviewed  ED Management:  This is an urgent evaluation of an 81-year-old female that presents to the emergency room complaining of a cough for 1 month.  Patient reports associated sneezing; symptoms are worse at night.   She reports her cough is productive.  She denies fevers, night sweats, weight loss, or other systemic symptoms.  Patient's primary care doctor prescribed Tessalon Perles without improvement.  The patient is pleasant and well-appearing in no apparent distress.  She did not cough at all during my exam.  HEENT is benign.  Lungs clear to auscultation in all fields.  O2 saturation is % on room air.  Chest x-ray today is negative for infiltrates or focal consolidations.  Viral URI is possible, though given her clinical presentation I consider less likely.  A COVID-19 swab is pending.  I am not concerned for PE, pneumonia, hypoxia.  I do not believe the patient warrants further workup at this time.  She also complains of a pruritic rash on her face.  The main distribution of the rash is on her forehead and chin, though it is also on her cheeks.  I wonder if this is an allergic response?  Will represcribed hydrocortisone cream that previously helped with her rash.  I also will prescribe Zyrtec for possible allergic etiology to her cough.  To follow-up with her primary care doctor for further evaluation and management of her symptoms in 1 week.  Return precautions were given for any new or worsening symptoms or concerns.          Scribe Attestation:   Scribe #1: I performed the above scribed service and the documentation accurately describes the services I performed. I attest to the accuracy of the note.                 Clinical Impression:   Final diagnoses:  [R05.9] Cough  [L28.2] Pruritic rash (Primary)          ED Disposition Condition    Discharge Stable        ED Prescriptions     Medication Sig Dispense Start Date End Date Auth. Provider    cetirizine (ZYRTEC) 10 MG tablet Take 1 tablet (10 mg total) by mouth once daily. 30 tablet 1/12/2022 3/13/2022 Edel Lawler NP    hydrocortisone 2.5 % lotion Apply topically 2 (two) times daily. 60 mL 1/12/2022  Edel Lawler NP        Follow-up Information     Follow up With  Specialties Details Why Contact Info    Kurt Mcdaniels MD Family Medicine Schedule an appointment as soon as possible for a visit in 1 week For re-evaluation of symptoms in 1 week [address]  [place] [address]  [phone]      [facility] Emergency Dept Emergency Medicine  As needed, If symptoms worsen [address] [place] [address] [phone]         I, Edel Lawler, personally performed the services described in this documentation. All medical record entries made by the scribe were at my direction and in my presence. I have reviewed the chart and agree that the record reflects my personal performance and is accurate and complete.     Edel Lawler, SACHI  01/12/22 0804

## 2022-01-12 NOTE — ED TRIAGE NOTES
Pt reports to the ED with C/O productive cough x1 month that is worse at night. Pt also reports chest wall tightness with the cough. Pt also reports rash around her lips and chin. Pt denies any other cold symptoms. NAD noted. ED workup in progress.

## 2022-03-09 ENCOUNTER — HOSPITAL ENCOUNTER (INPATIENT)
Facility: HOSPITAL | Age: 82
LOS: 1 days | Discharge: HOME | DRG: 309 | End: 2022-03-10
Attending: EMERGENCY MEDICINE | Admitting: EMERGENCY MEDICINE
Payer: MEDICARE

## 2022-03-09 DIAGNOSIS — I48.92 ATRIAL FLUTTER WITH RAPID VENTRICULAR RESPONSE: Primary | ICD-10-CM

## 2022-03-09 DIAGNOSIS — I48.92 ATRIAL FLUTTER, UNSPECIFIED TYPE: ICD-10-CM

## 2022-03-09 DIAGNOSIS — I48.92 ATRIAL FLUTTER: ICD-10-CM

## 2022-03-09 DIAGNOSIS — R07.9 CHEST PAIN: ICD-10-CM

## 2022-03-09 PROBLEM — I70.0 AORTIC ATHEROSCLEROSIS: Status: ACTIVE | Noted: 2022-03-09

## 2022-03-09 LAB
ALBUMIN SERPL BCP-MCNC: 3.6 G/DL (ref 3.5–5.2)
ALP SERPL-CCNC: 69 U/L (ref 55–135)
ALT SERPL W/O P-5'-P-CCNC: 22 U/L (ref 10–44)
ANION GAP SERPL CALC-SCNC: 8 MMOL/L (ref 8–16)
AORTIC ROOT ANNULUS: 2.84 CM
AORTIC VALVE CUSP SEPERATION: 1.85 CM
AST SERPL-CCNC: 19 U/L (ref 10–40)
AV INDEX (PROSTH): 0.61
AV MEAN GRADIENT: 4 MMHG
AV PEAK GRADIENT: 7 MMHG
AV VALVE AREA: 2.41 CM2
AV VELOCITY RATIO: 0.57
BASOPHILS # BLD AUTO: 0.04 K/UL (ref 0–0.2)
BASOPHILS NFR BLD: 0.4 % (ref 0–1.9)
BILIRUB SERPL-MCNC: 0.5 MG/DL (ref 0.1–1)
BNP SERPL-MCNC: 63 PG/ML (ref 0–99)
BSA FOR ECHO PROCEDURE: 2.23 M2
BUN SERPL-MCNC: 13 MG/DL (ref 8–23)
CALCIUM SERPL-MCNC: 9.2 MG/DL (ref 8.7–10.5)
CHLORIDE SERPL-SCNC: 101 MMOL/L (ref 95–110)
CHOLEST SERPL-MCNC: 189 MG/DL (ref 120–199)
CHOLEST/HDLC SERPL: 5 {RATIO} (ref 2–5)
CO2 SERPL-SCNC: 29 MMOL/L (ref 23–29)
CREAT SERPL-MCNC: 0.8 MG/DL (ref 0.5–1.4)
CRP SERPL-MCNC: 5.1 MG/L (ref 0–8.2)
CTP QC/QA: YES
CTP QC/QA: YES
CV ECHO LV RWT: 0.49 CM
D DIMER PPP IA.FEU-MCNC: 0.69 MG/L FEU
DIFFERENTIAL METHOD: ABNORMAL
DOP CALC AO PEAK VEL: 1.31 M/S
DOP CALC AO VTI: 26.5 CM
DOP CALC LVOT AREA: 4 CM2
DOP CALC LVOT DIAMETER: 2.25 CM
DOP CALC LVOT PEAK VEL: 0.75 M/S
DOP CALC LVOT STROKE VOLUME: 63.74 CM3
DOP CALCLVOT PEAK VEL VTI: 16.04 CM
E WAVE DECELERATION TIME: 192.69 MSEC
E/A RATIO: 1.87
ECHO LV POSTERIOR WALL: 1.14 CM (ref 0.6–1.1)
EJECTION FRACTION: 50 %
EOSINOPHIL # BLD AUTO: 0.1 K/UL (ref 0–0.5)
EOSINOPHIL NFR BLD: 0.9 % (ref 0–8)
ERYTHROCYTE [DISTWIDTH] IN BLOOD BY AUTOMATED COUNT: 13.8 % (ref 11.5–14.5)
EST. GFR  (AFRICAN AMERICAN): >60 ML/MIN/1.73 M^2
EST. GFR  (NON AFRICAN AMERICAN): >60 ML/MIN/1.73 M^2
FRACTIONAL SHORTENING: 36 % (ref 28–44)
GLUCOSE SERPL-MCNC: 98 MG/DL (ref 70–110)
HCT VFR BLD AUTO: 42.9 % (ref 37–48.5)
HDLC SERPL-MCNC: 38 MG/DL (ref 40–75)
HDLC SERPL: 20.1 % (ref 20–50)
HGB BLD-MCNC: 13.2 G/DL (ref 12–16)
IMM GRANULOCYTES # BLD AUTO: 0.04 K/UL (ref 0–0.04)
IMM GRANULOCYTES NFR BLD AUTO: 0.4 % (ref 0–0.5)
INR PPP: 1 (ref 0.8–1.2)
INTERVENTRICULAR SEPTUM: 1.09 CM (ref 0.6–1.1)
IVRT: 173.01 MSEC
LA MAJOR: 6.16 CM
LA MINOR: 5.88 CM
LA WIDTH: 4.08 CM
LACTATE SERPL-SCNC: 1.3 MMOL/L (ref 0.5–2.2)
LDLC SERPL CALC-MCNC: 120.8 MG/DL (ref 63–159)
LEFT ATRIUM SIZE: 4.99 CM
LEFT ATRIUM VOLUME INDEX: 49.6 ML/M2
LEFT ATRIUM VOLUME: 104.12 CM3
LEFT INTERNAL DIMENSION IN SYSTOLE: 3.02 CM (ref 2.1–4)
LEFT VENTRICLE DIASTOLIC VOLUME INDEX: 48.76 ML/M2
LEFT VENTRICLE DIASTOLIC VOLUME: 102.39 ML
LEFT VENTRICLE MASS INDEX: 91 G/M2
LEFT VENTRICLE SYSTOLIC VOLUME INDEX: 16.9 ML/M2
LEFT VENTRICLE SYSTOLIC VOLUME: 35.5 ML
LEFT VENTRICULAR INTERNAL DIMENSION IN DIASTOLE: 4.7 CM (ref 3.5–6)
LEFT VENTRICULAR MASS: 191.12 G
LV SEPTAL E/E' RATIO: 51.5 M/S
LYMPHOCYTES # BLD AUTO: 2.9 K/UL (ref 1–4.8)
LYMPHOCYTES NFR BLD: 31.7 % (ref 18–48)
MAGNESIUM SERPL-MCNC: 2 MG/DL (ref 1.6–2.6)
MCH RBC QN AUTO: 26.9 PG (ref 27–31)
MCHC RBC AUTO-ENTMCNC: 30.8 G/DL (ref 32–36)
MCV RBC AUTO: 87 FL (ref 82–98)
MONOCYTES # BLD AUTO: 0.6 K/UL (ref 0.3–1)
MONOCYTES NFR BLD: 7 % (ref 4–15)
MV PEAK A VEL: 0.55 M/S
MV PEAK E VEL: 1.03 M/S
MV STENOSIS PRESSURE HALF TIME: 55.88 MS
MV VALVE AREA P 1/2 METHOD: 3.94 CM2
NEUTROPHILS # BLD AUTO: 5.4 K/UL (ref 1.8–7.7)
NEUTROPHILS NFR BLD: 59.6 % (ref 38–73)
NONHDLC SERPL-MCNC: 151 MG/DL
NRBC BLD-RTO: 0 /100 WBC
PISA TR MAX VEL: 1.59 M/S
PLATELET # BLD AUTO: 299 K/UL (ref 150–450)
PMV BLD AUTO: 10.1 FL (ref 9.2–12.9)
POC MOLECULAR INFLUENZA A AGN: NEGATIVE
POC MOLECULAR INFLUENZA B AGN: NEGATIVE
POTASSIUM SERPL-SCNC: 4.6 MMOL/L (ref 3.5–5.1)
PROCALCITONIN SERPL IA-MCNC: 0.02 NG/ML
PROT SERPL-MCNC: 7.4 G/DL (ref 6–8.4)
PROTHROMBIN TIME: 10.4 SEC (ref 9–12.5)
PV PEAK VELOCITY: 1.09 CM/S
RA MAJOR: 5.74 CM
RA PRESSURE: 8 MMHG
RA WIDTH: 4.01 CM
RBC # BLD AUTO: 4.91 M/UL (ref 4–5.4)
RIGHT VENTRICULAR END-DIASTOLIC DIMENSION: 4.79 CM
RV TISSUE DOPPLER FREE WALL SYSTOLIC VELOCITY 1 (APICAL 4 CHAMBER VIEW): 10.62 CM/S
SARS-COV-2 RDRP RESP QL NAA+PROBE: NEGATIVE
SINUS: 2.99 CM
SODIUM SERPL-SCNC: 138 MMOL/L (ref 136–145)
STJ: 2.42 CM
TDI SEPTAL: 0.02 M/S
TR MAX PG: 10 MMHG
TRICUSPID ANNULAR PLANE SYSTOLIC EXCURSION: 2.08 CM
TRIGL SERPL-MCNC: 151 MG/DL (ref 30–150)
TROPONIN I SERPL DL<=0.01 NG/ML-MCNC: 0.01 NG/ML (ref 0–0.03)
TSH SERPL DL<=0.005 MIU/L-ACNC: 2.44 UIU/ML (ref 0.4–4)
TV REST PULMONARY ARTERY PRESSURE: 18 MMHG
WBC # BLD AUTO: 9.04 K/UL (ref 3.9–12.7)

## 2022-03-09 PROCEDURE — 83880 ASSAY OF NATRIURETIC PEPTIDE: CPT | Performed by: PHYSICIAN ASSISTANT

## 2022-03-09 PROCEDURE — 93010 ELECTROCARDIOGRAM REPORT: CPT | Mod: ,,, | Performed by: INTERNAL MEDICINE

## 2022-03-09 PROCEDURE — 80053 COMPREHEN METABOLIC PANEL: CPT | Performed by: PHYSICIAN ASSISTANT

## 2022-03-09 PROCEDURE — 86140 C-REACTIVE PROTEIN: CPT | Performed by: PHYSICIAN ASSISTANT

## 2022-03-09 PROCEDURE — 99285 EMERGENCY DEPT VISIT HI MDM: CPT | Mod: 25

## 2022-03-09 PROCEDURE — 84443 ASSAY THYROID STIM HORMONE: CPT | Performed by: PHYSICIAN ASSISTANT

## 2022-03-09 PROCEDURE — 85379 FIBRIN DEGRADATION QUANT: CPT | Performed by: PHYSICIAN ASSISTANT

## 2022-03-09 PROCEDURE — 84484 ASSAY OF TROPONIN QUANT: CPT | Performed by: PHYSICIAN ASSISTANT

## 2022-03-09 PROCEDURE — 93010 EKG 12-LEAD: ICD-10-PCS | Mod: ,,, | Performed by: INTERNAL MEDICINE

## 2022-03-09 PROCEDURE — 99291 PR CRITICAL CARE, E/M 30-74 MINUTES: ICD-10-PCS | Mod: ,,, | Performed by: INTERNAL MEDICINE

## 2022-03-09 PROCEDURE — 93005 ELECTROCARDIOGRAM TRACING: CPT

## 2022-03-09 PROCEDURE — 99291 CRITICAL CARE FIRST HOUR: CPT | Mod: ,,, | Performed by: INTERNAL MEDICINE

## 2022-03-09 PROCEDURE — 25000003 PHARM REV CODE 250: Performed by: INTERNAL MEDICINE

## 2022-03-09 PROCEDURE — 85025 COMPLETE CBC W/AUTO DIFF WBC: CPT | Performed by: PHYSICIAN ASSISTANT

## 2022-03-09 PROCEDURE — 83735 ASSAY OF MAGNESIUM: CPT | Performed by: PHYSICIAN ASSISTANT

## 2022-03-09 PROCEDURE — 25000003 PHARM REV CODE 250: Performed by: HOSPITALIST

## 2022-03-09 PROCEDURE — U0002 COVID-19 LAB TEST NON-CDC: HCPCS | Performed by: PHYSICIAN ASSISTANT

## 2022-03-09 PROCEDURE — 20000000 HC ICU ROOM

## 2022-03-09 PROCEDURE — 25000003 PHARM REV CODE 250: Performed by: EMERGENCY MEDICINE

## 2022-03-09 PROCEDURE — 80061 LIPID PANEL: CPT | Performed by: PHYSICIAN ASSISTANT

## 2022-03-09 PROCEDURE — 84145 PROCALCITONIN (PCT): CPT | Performed by: PHYSICIAN ASSISTANT

## 2022-03-09 PROCEDURE — 83605 ASSAY OF LACTIC ACID: CPT | Performed by: PHYSICIAN ASSISTANT

## 2022-03-09 PROCEDURE — A4216 STERILE WATER/SALINE, 10 ML: HCPCS | Performed by: HOSPITALIST

## 2022-03-09 PROCEDURE — 85610 PROTHROMBIN TIME: CPT | Performed by: PHYSICIAN ASSISTANT

## 2022-03-09 RX ORDER — LANOLIN ALCOHOL/MO/W.PET/CERES
800 CREAM (GRAM) TOPICAL
Status: DISCONTINUED | OUTPATIENT
Start: 2022-03-09 | End: 2022-03-10 | Stop reason: HOSPADM

## 2022-03-09 RX ORDER — IBUPROFEN 200 MG
16 TABLET ORAL
Status: DISCONTINUED | OUTPATIENT
Start: 2022-03-09 | End: 2022-03-10 | Stop reason: HOSPADM

## 2022-03-09 RX ORDER — TALC
6 POWDER (GRAM) TOPICAL NIGHTLY PRN
Status: DISCONTINUED | OUTPATIENT
Start: 2022-03-09 | End: 2022-03-10 | Stop reason: HOSPADM

## 2022-03-09 RX ORDER — IBUPROFEN 200 MG
24 TABLET ORAL
Status: DISCONTINUED | OUTPATIENT
Start: 2022-03-09 | End: 2022-03-10 | Stop reason: HOSPADM

## 2022-03-09 RX ORDER — ACETAMINOPHEN 325 MG/1
650 TABLET ORAL EVERY 4 HOURS PRN
Status: DISCONTINUED | OUTPATIENT
Start: 2022-03-09 | End: 2022-03-10 | Stop reason: HOSPADM

## 2022-03-09 RX ORDER — GLUCAGON 1 MG
1 KIT INJECTION
Status: DISCONTINUED | OUTPATIENT
Start: 2022-03-09 | End: 2022-03-10 | Stop reason: HOSPADM

## 2022-03-09 RX ORDER — SODIUM,POTASSIUM PHOSPHATES 280-250MG
2 POWDER IN PACKET (EA) ORAL
Status: DISCONTINUED | OUTPATIENT
Start: 2022-03-09 | End: 2022-03-10 | Stop reason: HOSPADM

## 2022-03-09 RX ORDER — LEVALBUTEROL 1.25 MG/.5ML
1.25 SOLUTION, CONCENTRATE RESPIRATORY (INHALATION) EVERY 8 HOURS
Status: DISCONTINUED | OUTPATIENT
Start: 2022-03-10 | End: 2022-03-10 | Stop reason: HOSPADM

## 2022-03-09 RX ORDER — PANTOPRAZOLE SODIUM 40 MG/1
40 TABLET, DELAYED RELEASE ORAL DAILY
Status: DISCONTINUED | OUTPATIENT
Start: 2022-03-10 | End: 2022-03-10 | Stop reason: HOSPADM

## 2022-03-09 RX ORDER — AMOXICILLIN 250 MG
1 CAPSULE ORAL 2 TIMES DAILY
Status: DISCONTINUED | OUTPATIENT
Start: 2022-03-09 | End: 2022-03-10 | Stop reason: HOSPADM

## 2022-03-09 RX ORDER — SODIUM CHLORIDE 0.9 % (FLUSH) 0.9 %
10 SYRINGE (ML) INJECTION EVERY 8 HOURS
Status: DISCONTINUED | OUTPATIENT
Start: 2022-03-09 | End: 2022-03-10 | Stop reason: HOSPADM

## 2022-03-09 RX ORDER — DILTIAZEM HCL 1 MG/ML
5 INJECTION, SOLUTION INTRAVENOUS
Status: DISCONTINUED | OUTPATIENT
Start: 2022-03-09 | End: 2022-03-09

## 2022-03-09 RX ORDER — HYDROCHLOROTHIAZIDE 12.5 MG/1
12.5 TABLET ORAL DAILY
Status: DISCONTINUED | OUTPATIENT
Start: 2022-03-10 | End: 2022-03-10 | Stop reason: HOSPADM

## 2022-03-09 RX ORDER — ONDANSETRON 2 MG/ML
4 INJECTION INTRAMUSCULAR; INTRAVENOUS EVERY 8 HOURS PRN
Status: DISCONTINUED | OUTPATIENT
Start: 2022-03-09 | End: 2022-03-10 | Stop reason: HOSPADM

## 2022-03-09 RX ORDER — CETIRIZINE HYDROCHLORIDE 10 MG/1
10 TABLET ORAL NIGHTLY
Status: DISCONTINUED | OUTPATIENT
Start: 2022-03-09 | End: 2022-03-10 | Stop reason: HOSPADM

## 2022-03-09 RX ORDER — LOSARTAN POTASSIUM 25 MG/1
100 TABLET ORAL DAILY
Status: DISCONTINUED | OUTPATIENT
Start: 2022-03-10 | End: 2022-03-10 | Stop reason: HOSPADM

## 2022-03-09 RX ORDER — GUAIFENESIN 100 MG/5ML
200 SOLUTION ORAL EVERY 4 HOURS PRN
Status: DISCONTINUED | OUTPATIENT
Start: 2022-03-09 | End: 2022-03-10 | Stop reason: HOSPADM

## 2022-03-09 RX ORDER — LOSARTAN POTASSIUM 100 MG/1
100 TABLET ORAL DAILY
Status: ON HOLD | COMMUNITY
End: 2022-03-09 | Stop reason: CLARIF

## 2022-03-09 RX ORDER — DILTIAZEM HCL 1 MG/ML
0-15 INJECTION, SOLUTION INTRAVENOUS CONTINUOUS
Status: DISCONTINUED | OUTPATIENT
Start: 2022-03-09 | End: 2022-03-10

## 2022-03-09 RX ORDER — LOSARTAN POTASSIUM AND HYDROCHLOROTHIAZIDE 12.5; 1 MG/1; MG/1
1 TABLET ORAL DAILY
COMMUNITY
Start: 2021-10-27

## 2022-03-09 RX ADMIN — MELATONIN TAB 3 MG 6 MG: 3 TAB at 09:03

## 2022-03-09 RX ADMIN — GUAIFENESIN 200 MG: 100 SOLUTION ORAL at 05:03

## 2022-03-09 RX ADMIN — SENNOSIDES AND DOCUSATE SODIUM 1 TABLET: 50; 8.6 TABLET ORAL at 09:03

## 2022-03-09 RX ADMIN — CETIRIZINE HYDROCHLORIDE 10 MG: 10 TABLET, FILM COATED ORAL at 09:03

## 2022-03-09 RX ADMIN — APIXABAN 5 MG: 5 TABLET, FILM COATED ORAL at 09:03

## 2022-03-09 RX ADMIN — Medication 10 ML: at 10:03

## 2022-03-09 RX ADMIN — DILTIAZEM HYDROCHLORIDE 7.5 MG/HR: 5 INJECTION INTRAVENOUS at 05:03

## 2022-03-09 NOTE — SUBJECTIVE & OBJECTIVE
Past Medical History:   Diagnosis Date    Arthritis     Chronic back pain     High cholesterol     Hypertension     Spinal stenosis     Thyroid disease        Past Surgical History:   Procedure Laterality Date    EYE SURGERY      TUBAL LIGATION      Uterine suspension         Review of patient's allergies indicates:  No Known Allergies    No current facility-administered medications on file prior to encounter.     Current Outpatient Medications on File Prior to Encounter   Medication Sig    aspirin (ECOTRIN) 81 MG EC tablet Take 1 tablet (81 mg total) by mouth once daily.    losartan (COZAAR) 100 MG tablet Take 100 mg by mouth once daily.    alendronate (FOSAMAX) 70 MG tablet Take 1 tablet (70 mg total) by mouth every 7 days.    atorvastatin (LIPITOR) 10 MG tablet Take 1 tablet (10 mg total) by mouth once daily.    benzonatate (TESSALON) 200 MG capsule Take 200 mg by mouth 3 (three) times daily as needed for Cough.    cetirizine (ZYRTEC) 10 MG tablet Take 1 tablet (10 mg total) by mouth once daily.    gabapentin (NEURONTIN) 300 MG capsule One tablet by mouth every 8 hours for leg pain.    hydrocortisone 2.5 % lotion Apply topically 2 (two) times daily.    hydrOXYzine HCl (ATARAX) 25 MG tablet Take 1 tablet (25 mg total) by mouth every 8 (eight) hours as needed for Itching.    levothyroxine (SYNTHROID) 50 MCG tablet Take 1 tablet (50 mcg total) by mouth once daily.    lidocaine (LIDODERM) 5 % Place 1 patch onto the skin once daily. Remove & Discard patch within 12 hours or as directed by MD    losartan-hydrochlorothiazide 50-12.5 mg (HYZAAR) 50-12.5 mg per tablet Take 1 tablet by mouth once daily.    loteprednol (ALREX) 0.2 % DrpS 1 drop 4 (four) times daily.    meloxicam (MOBIC) 15 MG tablet Take 1 tablet (15 mg total) by mouth daily as needed for Pain.    naproxen (NAPROSYN) 500 MG tablet Take 1 tablet (500 mg total) by mouth 2 (two) times daily with meals.    omeprazole (PRILOSEC) 40 MG capsule Take 1 capsule (40  mg total) by mouth once daily.    oxycodone-acetaminophen (PERCOCET) 7.5-325 mg per tablet Take 1 tablet by mouth every 6 (six) hours as needed for Pain.    VITAMIN D2 50,000 unit capsule Take 50,000 Units by mouth every 30 days.    [DISCONTINUED] loratadine (CLARITIN) 10 mg tablet Take 1 tablet (10 mg total) by mouth once daily.     Family History    None       Tobacco Use    Smoking status: Never Smoker    Smokeless tobacco: Never Used   Substance and Sexual Activity    Alcohol use: No    Drug use: No    Sexual activity: Not Currently     Review of Systems   Constitutional: Negative for chills, diaphoresis, fever and malaise/fatigue.   HENT:  Negative for nosebleeds.    Eyes:  Negative for blurred vision and double vision.   Cardiovascular:  Positive for chest pain. Negative for claudication, cyanosis, dyspnea on exertion, leg swelling, orthopnea, palpitations, paroxysmal nocturnal dyspnea and syncope.   Respiratory:  Positive for cough and shortness of breath. Negative for wheezing.    Skin:  Negative for dry skin and poor wound healing.   Musculoskeletal:  Negative for back pain, joint swelling and myalgias.   Gastrointestinal:  Negative for abdominal pain, nausea and vomiting.   Genitourinary:  Negative for hematuria.   Neurological:  Negative for dizziness, headaches, numbness, seizures and weakness.   Psychiatric/Behavioral:  Negative for altered mental status and depression.    Objective:     Vital Signs (Most Recent):  Temp: 98.2 °F (36.8 °C) (03/09/22 0952)  Pulse: 74 (03/09/22 1233)  Resp: (!) 29 (03/09/22 1233)  BP: (!) 156/89 (03/09/22 1233)  SpO2: 98 % (03/09/22 1233)   Vital Signs (24h Range):  Temp:  [98.2 °F (36.8 °C)] 98.2 °F (36.8 °C)  Pulse:  [74-86] 74  Resp:  [18-29] 29  SpO2:  [96 %-99 %] 98 %  BP: (142-162)/() 156/89     Weight: 113.4 kg (250 lb)  Body mass index is 45.73 kg/m².    SpO2: 98 %  O2 Device (Oxygen Therapy): room air    No intake or output data in the 24 hours ending  03/09/22 1256    Lines/Drains/Airways       Peripheral Intravenous Line  Duration                  Peripheral IV - Single Lumen 03/09/22 1107 20 G Right Hand <1 day         Peripheral IV - Single Lumen 03/09/22 1154 20 G Right Forearm <1 day                    Physical Exam  Constitutional:       General: She is not in acute distress.     Appearance: She is well-developed. She is obese. She is not ill-appearing, toxic-appearing or diaphoretic.   HENT:      Head: Normocephalic and atraumatic.   Eyes:      General: No scleral icterus.     Extraocular Movements: Extraocular movements intact.      Conjunctiva/sclera: Conjunctivae normal.      Pupils: Pupils are equal, round, and reactive to light.   Neck:      Vascular: No JVD.      Trachea: No tracheal deviation.   Cardiovascular:      Rate and Rhythm: Tachycardia present. Rhythm irregular.      Heart sounds: S1 normal and S2 normal. Heart sounds are distant. No murmur heard.    No friction rub. No gallop.   Pulmonary:      Effort: Pulmonary effort is normal. No respiratory distress.      Breath sounds: Normal breath sounds. No stridor. No wheezing, rhonchi or rales.   Chest:      Chest wall: No tenderness.   Abdominal:      General: There is no distension.      Palpations: Abdomen is soft.   Musculoskeletal:         General: No swelling or tenderness. Normal range of motion.      Cervical back: Normal range of motion and neck supple. No rigidity.      Right lower leg: No edema.      Left lower leg: No edema.   Skin:     General: Skin is warm and dry.      Coloration: Skin is not jaundiced.   Neurological:      General: No focal deficit present.      Mental Status: She is alert and oriented to person, place, and time.      Cranial Nerves: No cranial nerve deficit.   Psychiatric:         Mood and Affect: Mood normal.         Behavior: Behavior normal.       Current Medications:          Laboratory (all labs reviewed):  CBC:  Recent Labs   Lab 03/09/22  1114   WBC 9.04    Hemoglobin 13.2   Hematocrit 42.9   Platelets 299       CHEMISTRIES:  Recent Labs   Lab 03/09/22  1120   Glucose 98   Sodium 138   Potassium 4.6   BUN 13   Creatinine 0.8   eGFR if  >60   eGFR if non African American >60   Calcium 9.2   Magnesium 2.0       CARDIAC BIOMARKERS:  Recent Labs   Lab 03/09/22  1120   Troponin I 0.007       COAGS:  Recent Labs   Lab 03/09/22  1114   INR 1.0       LIPIDS/LFTS:  Recent Labs   Lab 03/09/22  1120   AST 19   ALT 22       BNP:  Recent Labs   Lab 03/09/22  1114   BNP 63       TSH:  Recent Labs   Lab 03/09/22  1114   TSH 2.443       Free T4:        Diagnostic Results:  ECG (personally reviewed and interpreted tracing(s)):  3/9/22 1026     Chest X-Ray (personally reviewed and interpreted image(s)): 3/9/22 NAD, aortic atherosclerosis    Echo: ordered    GALEN/DCCV planned

## 2022-03-09 NOTE — NURSING
82y female admitted to room 278 via stretcher from ER w/ A- flutter w/ RVR. Pt oriented to ICU routine and personnel. Pt on Cardizem infusion at 7.5ml/hr. Assisted to ambulate to bathroom in room. Mild S.OB. noted while walking in room. A-flutter per hr monitor HR 7-110. Mildly tachypnea but denies S.O.B. Instructed  to call for assisted when up to bathroom. Verbalized understanding.

## 2022-03-09 NOTE — SUBJECTIVE & OBJECTIVE
Past Medical History:   Diagnosis Date    Arthritis     Chronic back pain     High cholesterol     Hypertension     Spinal stenosis     Thyroid disease        Past Surgical History:   Procedure Laterality Date    EYE SURGERY      TUBAL LIGATION      Uterine suspension         Review of patient's allergies indicates:  No Known Allergies    No current facility-administered medications on file prior to encounter.     Current Outpatient Medications on File Prior to Encounter   Medication Sig    aspirin (ECOTRIN) 81 MG EC tablet Take 1 tablet (81 mg total) by mouth once daily.    [DISCONTINUED] losartan (COZAAR) 100 MG tablet Take 100 mg by mouth once daily.    benzonatate (TESSALON) 200 MG capsule Take 200 mg by mouth 3 (three) times daily as needed for Cough.    losartan-hydrochlorothiazide 100-12.5 mg (HYZAAR) 100-12.5 mg Tab Take 1 tablet by mouth once daily.    omeprazole (PRILOSEC) 40 MG capsule Take 1 capsule (40 mg total) by mouth once daily.    [DISCONTINUED] alendronate (FOSAMAX) 70 MG tablet Take 1 tablet (70 mg total) by mouth every 7 days.    [DISCONTINUED] atorvastatin (LIPITOR) 10 MG tablet Take 1 tablet (10 mg total) by mouth once daily.    [DISCONTINUED] cetirizine (ZYRTEC) 10 MG tablet Take 1 tablet (10 mg total) by mouth once daily.    [DISCONTINUED] gabapentin (NEURONTIN) 300 MG capsule One tablet by mouth every 8 hours for leg pain.    [DISCONTINUED] hydrocortisone 2.5 % lotion Apply topically 2 (two) times daily.    [DISCONTINUED] hydrOXYzine HCl (ATARAX) 25 MG tablet Take 1 tablet (25 mg total) by mouth every 8 (eight) hours as needed for Itching.    [DISCONTINUED] levothyroxine (SYNTHROID) 50 MCG tablet Take 1 tablet (50 mcg total) by mouth once daily.    [DISCONTINUED] lidocaine (LIDODERM) 5 % Place 1 patch onto the skin once daily. Remove & Discard patch within 12 hours or as directed by MD    [DISCONTINUED] loratadine (CLARITIN) 10 mg tablet Take 1 tablet (10 mg total) by mouth once daily.     [DISCONTINUED] loteprednol (ALREX) 0.2 % DrpS 1 drop 4 (four) times daily.    [DISCONTINUED] meloxicam (MOBIC) 15 MG tablet Take 1 tablet (15 mg total) by mouth daily as needed for Pain.    [DISCONTINUED] naproxen (NAPROSYN) 500 MG tablet Take 1 tablet (500 mg total) by mouth 2 (two) times daily with meals.    [DISCONTINUED] oxycodone-acetaminophen (PERCOCET) 7.5-325 mg per tablet Take 1 tablet by mouth every 6 (six) hours as needed for Pain.    [DISCONTINUED] VITAMIN D2 50,000 unit capsule Take 50,000 Units by mouth every 30 days.     Family History    None     Unknown family history    Tobacco Use    Smoking status: Never Smoker    Smokeless tobacco: Never Used   Substance and Sexual Activity    Alcohol use: No    Drug use: No    Sexual activity: Not Currently     Review of Systems   Constitutional:  Negative for chills, fatigue and fever.   HENT:  Negative for congestion, postnasal drip, rhinorrhea, sinus pressure, sinus pain and trouble swallowing.    Respiratory:  Positive for cough. Negative for chest tightness, shortness of breath and wheezing.    Cardiovascular:  Negative for chest pain, palpitations and leg swelling.   Gastrointestinal:  Negative for abdominal pain, constipation, diarrhea, nausea and vomiting.   Genitourinary:  Negative for difficulty urinating.   Musculoskeletal:  Negative for arthralgias and myalgias.   Skin:  Negative for rash.   Neurological:  Negative for dizziness, weakness, light-headedness, numbness and headaches.   Psychiatric/Behavioral:  Negative for confusion.    Objective:     Vital Signs (Most Recent):  Temp: 98.5 °F (36.9 °C) (03/09/22 1537)  Pulse: 72 (03/09/22 1537)  Resp: (!) 28 (03/09/22 1537)  BP: 130/79 (03/09/22 1537)  SpO2: 96 % (03/09/22 1537)   Vital Signs (24h Range):  Temp:  [98.2 °F (36.8 °C)-98.5 °F (36.9 °C)] 98.5 °F (36.9 °C)  Pulse:  [67-86] 72  Resp:  [18-30] 28  SpO2:  [96 %-99 %] 96 %  BP: (103-162)/() 130/79     Weight: 120.7 kg (266 lb 3.2  oz)  Body mass index is 45.69 kg/m².    Physical Exam  Vitals and nursing note reviewed.   Constitutional:       General: She is not in acute distress.     Appearance: She is obese. She is not ill-appearing or toxic-appearing.   HENT:      Head: Normocephalic and atraumatic.      Nose: Nose normal.      Mouth/Throat:      Mouth: Mucous membranes are moist.   Cardiovascular:      Rate and Rhythm: Tachycardia present. Rhythm irregular.      Pulses: Normal pulses.      Heart sounds: No murmur heard.    No gallop.      Comments: Atrial flutter on telemetry  Pulmonary:      Effort: Pulmonary effort is normal. No respiratory distress.      Breath sounds: Normal breath sounds. No wheezing or rales.      Comments: Room air  Abdominal:      General: Bowel sounds are normal. There is no distension.      Palpations: Abdomen is soft.      Tenderness: There is no abdominal tenderness. There is no guarding.   Musculoskeletal:      Right lower leg: Edema (trace) present.      Left lower leg: Edema (trace) present.   Skin:     Findings: No rash.   Neurological:      Mental Status: She is alert and oriented to person, place, and time.           Significant Labs: All pertinent labs within the past 24 hours have been reviewed.    Significant Imaging: I have reviewed all pertinent imaging results/findings within the past 24 hours.

## 2022-03-09 NOTE — ED NOTES
Neck: Neck supple. No tracheal deviation present.   Normal range of motion.  Cardiovascular: Regular rhythm. Tachycardia present.    Atrial flutter ranging from 110-130 bpm   Pulmonary/Chest: Breath sounds normal. No accessory muscle usage or stridor. No tachypnea. No respiratory distress.   Very faint diffuse expiratory wheeze with rales   Abdominal: Abdomen is soft. She exhibits no distension. There is no abdominal tenderness. There is no guarding.   Musculoskeletal:         General: No edema. Normal range of motion.      Cervical back: Normal range of motion and neck supple.      Comments: No lower extremity swelling or tenderness.     Neurological: She is alert and oriented to person, place, and time. She displays no tremor. She displays no seizure activity. Coordination and gait normal.   Skin: Skin is warm, dry and intact. Capillary refill takes less than 2 seconds. No rash noted. No erythema.   Psychiatric: She has a normal mood and affect.

## 2022-03-09 NOTE — PLAN OF CARE
To ER today with cough.  Found to be in A flutter.  Now on Diltiazem infusion with good rate control.  Plan for GALEN/Cardioversion in AM.

## 2022-03-09 NOTE — ASSESSMENT & PLAN NOTE
Seems to be presenting issue  HR controlled on dilt gtt  Start OAC: eliquis 5mg bid  Plan GALEN/DCCV in am    Risks, benefits and alternatives of the GALEN/Cardioversion procedure were discussed with the patient via  including throat irritation, aspiration, anesthetic complications, esophageal irritation/perforation, skin irritation, arrhythmia, etc.  Patient understands and agrees to proceed.  Permits signed.

## 2022-03-09 NOTE — ASSESSMENT & PLAN NOTE
Presented with new onset A Flutter  With cough which may have triggered    TTE normal EF  TSH normal (off levothyroxine)    Started diltiazem gtt  Started eliquis  NPO for GALEN/DCCV in AM    [id number] - VASc score:  Congestive Heart Failure or EF < 35% NO   Hypertension YES  +1   Age >= 75 YES  +2   Diabetes Mellitus NO   Stroke/TIA prior history NO   Vascular disease (PAD, MI or Aortic Plaque)? YES  +1   Age 64 - 74 NO   Female YES  +1   Total 5

## 2022-03-09 NOTE — ASSESSMENT & PLAN NOTE
Body mass index is 45.69 kg/m². Morbid obesity complicates all aspects of disease management from diagnostic modalities to treatment. Weight loss encouraged and health benefits explained to patient.

## 2022-03-09 NOTE — CONSULTS
[facility] Emergency Dept  Cardiology  Consult Note    Patient Name: Amy Schreiber  MRN: [id number]  Admission Date: 3/9/2022  Hospital Length of Stay: 0 days  Code Status: No Order   Attending Provider: Ariana Colby MD   Consulting Provider: David Dillon MD  Primary Care Physician: Kurt Mcdaniels MD  Principal Problem:Atrial flutter with rapid ventricular response    Patient information was obtained from patient and ER records.     Inpatient consult to Cardiology  Consult performed by: David Dillon MD  Consult ordered by: Kristin Lugo MD  Reason for consult: AFL        Subjective:     Chief Complaint:  Malaise, cough, sob, CP     HPI:   81 y.o. female with past medical history of hypertension presenting with one week history of cough with yellow/white phlegm associated with fatigue. Patient reports history of chronic cough, but states cough has worsened. Patient reports history of pneumonia and bronchitis in the past, states she was prescribed an inhaler but ran out. She denies doing any breathing treatments today. She has been attempting treatment with over the counter medications with no relief. No fever, abdominal pain, chest pain, SOB, leg swelling, or further complaints.    Cardiology consulted for AFL/RVR.      #[id number] used for interview.      Patient presents emergency room with 1.5 weeks of progressive malaise, shortness of breath, and cough as well as some left-sided chest discomfort.  She is found to be in atrial flutter which is new diagnosis for the patient.  She denies any prior history of stroke or TIA report a history of prior hypertension, diabetes, and dyslipidemia.  Her heart rate is reasonably controlled on a diltiazem drip.  Pros and cons of GALEN guided cardioversion were discussed with the patient via a  and she agrees to proceed.  Permit has been signed and witnessed by the patient's ER nurse.      Past Medical History:   Diagnosis  Date    Arthritis     Chronic back pain     High cholesterol     Hypertension     Spinal stenosis     Thyroid disease        Past Surgical History:   Procedure Laterality Date    EYE SURGERY      TUBAL LIGATION      Uterine suspension         Review of patient's allergies indicates:  No Known Allergies    No current facility-administered medications on file prior to encounter.     Current Outpatient Medications on File Prior to Encounter   Medication Sig    aspirin (ECOTRIN) 81 MG EC tablet Take 1 tablet (81 mg total) by mouth once daily.    losartan (COZAAR) 100 MG tablet Take 100 mg by mouth once daily.    alendronate (FOSAMAX) 70 MG tablet Take 1 tablet (70 mg total) by mouth every 7 days.    atorvastatin (LIPITOR) 10 MG tablet Take 1 tablet (10 mg total) by mouth once daily.    benzonatate (TESSALON) 200 MG capsule Take 200 mg by mouth 3 (three) times daily as needed for Cough.    cetirizine (ZYRTEC) 10 MG tablet Take 1 tablet (10 mg total) by mouth once daily.    gabapentin (NEURONTIN) 300 MG capsule One tablet by mouth every 8 hours for leg pain.    hydrocortisone 2.5 % lotion Apply topically 2 (two) times daily.    hydrOXYzine HCl (ATARAX) 25 MG tablet Take 1 tablet (25 mg total) by mouth every 8 (eight) hours as needed for Itching.    levothyroxine (SYNTHROID) 50 MCG tablet Take 1 tablet (50 mcg total) by mouth once daily.    lidocaine (LIDODERM) 5 % Place 1 patch onto the skin once daily. Remove & Discard patch within 12 hours or as directed by MD    losartan-hydrochlorothiazide 50-12.5 mg (HYZAAR) 50-12.5 mg per tablet Take 1 tablet by mouth once daily.    loteprednol (ALREX) 0.2 % DrpS 1 drop 4 (four) times daily.    meloxicam (MOBIC) 15 MG tablet Take 1 tablet (15 mg total) by mouth daily as needed for Pain.    naproxen (NAPROSYN) 500 MG tablet Take 1 tablet (500 mg total) by mouth 2 (two) times daily with meals.    omeprazole (PRILOSEC) 40 MG capsule Take 1 capsule (40 mg total)  by mouth once daily.    oxycodone-acetaminophen (PERCOCET) 7.5-325 mg per tablet Take 1 tablet by mouth every 6 (six) hours as needed for Pain.    VITAMIN D2 50,000 unit capsule Take 50,000 Units by mouth every 30 days.    [DISCONTINUED] loratadine (CLARITIN) 10 mg tablet Take 1 tablet (10 mg total) by mouth once daily.     Family History    None       Tobacco Use    Smoking status: Never Smoker    Smokeless tobacco: Never Used   Substance and Sexual Activity    Alcohol use: No    Drug use: No    Sexual activity: Not Currently     Review of Systems   Constitutional: Negative for chills, diaphoresis, fever and malaise/fatigue.   HENT:  Negative for nosebleeds.    Eyes:  Negative for blurred vision and double vision.   Cardiovascular:  Positive for chest pain. Negative for claudication, cyanosis, dyspnea on exertion, leg swelling, orthopnea, palpitations, paroxysmal nocturnal dyspnea and syncope.   Respiratory:  Positive for cough and shortness of breath. Negative for wheezing.    Skin:  Negative for dry skin and poor wound healing.   Musculoskeletal:  Negative for back pain, joint swelling and myalgias.   Gastrointestinal:  Negative for abdominal pain, nausea and vomiting.   Genitourinary:  Negative for hematuria.   Neurological:  Negative for dizziness, headaches, numbness, seizures and weakness.   Psychiatric/Behavioral:  Negative for altered mental status and depression.    Objective:     Vital Signs (Most Recent):  Temp: 98.2 °F (36.8 °C) (03/09/22 0952)  Pulse: 74 (03/09/22 1233)  Resp: (!) 29 (03/09/22 1233)  BP: (!) 156/89 (03/09/22 1233)  SpO2: 98 % (03/09/22 1233)   Vital Signs (24h Range):  Temp:  [98.2 °F (36.8 °C)] 98.2 °F (36.8 °C)  Pulse:  [74-86] 74  Resp:  [18-29] 29  SpO2:  [96 %-99 %] 98 %  BP: (142-162)/() 156/89     Weight: 113.4 kg (250 lb)  Body mass index is 45.73 kg/m².    SpO2: 98 %  O2 Device (Oxygen Therapy): room air    No intake or output data in the 24 hours ending 03/09/22  1256    Lines/Drains/Airways       Peripheral Intravenous Line  Duration                  Peripheral IV - Single Lumen 03/09/22 1107 20 G Right Hand <1 day         Peripheral IV - Single Lumen 03/09/22 1154 20 G Right Forearm <1 day                    Physical Exam  Constitutional:       General: She is not in acute distress.     Appearance: She is well-developed. She is obese. She is not ill-appearing, toxic-appearing or diaphoretic.   HENT:      Head: Normocephalic and atraumatic.   Eyes:      General: No scleral icterus.     Extraocular Movements: Extraocular movements intact.      Conjunctiva/sclera: Conjunctivae normal.      Pupils: Pupils are equal, round, and reactive to light.   Neck:      Vascular: No JVD.      Trachea: No tracheal deviation.   Cardiovascular:      Rate and Rhythm: Tachycardia present. Rhythm irregular.      Heart sounds: S1 normal and S2 normal. Heart sounds are distant. No murmur heard.    No friction rub. No gallop.   Pulmonary:      Effort: Pulmonary effort is normal. No respiratory distress.      Breath sounds: Normal breath sounds. No stridor. No wheezing, rhonchi or rales.   Chest:      Chest wall: No tenderness.   Abdominal:      General: There is no distension.      Palpations: Abdomen is soft.   Musculoskeletal:         General: No swelling or tenderness. Normal range of motion.      Cervical back: Normal range of motion and neck supple. No rigidity.      Right lower leg: No edema.      Left lower leg: No edema.   Skin:     General: Skin is warm and dry.      Coloration: Skin is not jaundiced.   Neurological:      General: No focal deficit present.      Mental Status: She is alert and oriented to person, place, and time.      Cranial Nerves: No cranial nerve deficit.   Psychiatric:         Mood and Affect: Mood normal.         Behavior: Behavior normal.       Current Medications:          Laboratory (all labs reviewed):  CBC:  Recent Labs   Lab 03/09/22  1114   WBC 9.04    Hemoglobin 13.2   Hematocrit 42.9   Platelets 299       CHEMISTRIES:  Recent Labs   Lab 03/09/22  1120   Glucose 98   Sodium 138   Potassium 4.6   BUN 13   Creatinine 0.8   eGFR if  >60   eGFR if non African American >60   Calcium 9.2   Magnesium 2.0       CARDIAC BIOMARKERS:  Recent Labs   Lab 03/09/22  1120   Troponin I 0.007       COAGS:  Recent Labs   Lab 03/09/22  1114   INR 1.0       LIPIDS/LFTS:  Recent Labs   Lab 03/09/22  1120   AST 19   ALT 22       BNP:  Recent Labs   Lab 03/09/22  1114   BNP 63       TSH:  Recent Labs   Lab 03/09/22  1114   TSH 2.443       Free T4:        Diagnostic Results:  ECG (personally reviewed and interpreted tracing(s)):  3/9/22 1026     Chest X-Ray (personally reviewed and interpreted image(s)): 3/9/22 NAD, aortic atherosclerosis    Echo: ordered    GALEN/DCCV planned      Assessment and Plan:     * Atrial flutter with rapid ventricular response  Seems to be presenting issue  HR controlled on dilt gtt  Start OAC: eliquis 5mg bid  Plan GALEN/DCCV in am    Risks, benefits and alternatives of the GALEN/Cardioversion procedure were discussed with the patient via  including throat irritation, aspiration, anesthetic complications, esophageal irritation/perforation, skin irritation, arrhythmia, etc.  Patient understands and agrees to proceed.  Permits signed.      Hypertension, benign  Cont med rx    Hyperlipidemia  Check lipids and initiate statin    Morbid obesity with BMI of 40.0-44.9, adult  ?BRITTANEY contributory    Aortic atherosclerosis  Noted on CXR 3/9/22        VTE Risk Mitigation (From admission, onward)         Ordered     apixaban tablet 5 mg  2 times daily         03/09/22 1330              Pt seen in ER.  Case d/w Dr. Lugo and RN.  Critical care time 35min    Thank you for your consult. I will follow-up with patient. Please contact us if you have any additional questions.    David Dillon MD  Cardiology   [facility] - Emergency  Dept

## 2022-03-09 NOTE — HPI
81 y.o. female with past medical history of hypertension presenting with one week history of cough with yellow/white phlegm associated with fatigue. Patient reports history of chronic cough, but states cough has worsened. Patient reports history of pneumonia and bronchitis in the past, states she was prescribed an inhaler but ran out. She denies doing any breathing treatments today. She has been attempting treatment with over the counter medications with no relief. No fever, abdominal pain, chest pain, SOB, leg swelling, or further complaints.    Cardiology consulted for AFL/RVR.      #[id number] used for interview.      Patient presents emergency room with 1.5 weeks of progressive malaise, shortness of breath, and cough as well as some left-sided chest discomfort.  She is found to be in atrial flutter which is new diagnosis for the patient.  She denies any prior history of stroke or TIA report a history of prior hypertension, diabetes, and dyslipidemia.  Her heart rate is reasonably controlled on a diltiazem drip.  Pros and cons of GALEN guided cardioversion were discussed with the patient via a  and she agrees to proceed.  Permit has been signed and witnessed by the patient's ER nurse.

## 2022-03-09 NOTE — ED TRIAGE NOTES
Patient presents to ED with complaints of a productive cough and fatigue which started approximately two weeks ago. Patient states she has been taking tessalon pearls without relief. Patient denies chest pain or or shortness of breath while at rest.

## 2022-03-09 NOTE — ED PROVIDER NOTES
Encounter Date: 3/9/2022    SCRIBE #1 NOTE: I, Carynrustam Baron, am scribing for, and in the presence of, RONEL Brown.       History     Chief Complaint   Patient presents with    Cough    Back Pain    Shortness of Breath     Patient reports a productive cough, upper back pain, and sob x 2 weeks. Denies fevers, chills.      81 y.o. female with past medical history of hypertension presenting with one week history of cough with yellow/white phlegm associated with fatigue. Patient reports history of chronic cough, but states cough has worsened. Patient reports history of pneumonia and bronchitis in the past, states she was prescribed an inhaler but ran out. She denies doing any breathing treatments today. She has been attempting treatment with over the counter medications with no relief. No fever, abdominal pain, chest pain, SOB, leg swelling, or further complaints.     The history is provided by the patient.     Review of patient's allergies indicates:  No Known Allergies  Past Medical History:   Diagnosis Date    Arthritis     Chronic back pain     High cholesterol     Hypertension     Spinal stenosis     Thyroid disease      Past Surgical History:   Procedure Laterality Date    EYE SURGERY      TUBAL LIGATION      Uterine suspension       Family History   Problem Relation Age of Onset    Breast cancer Neg Hx     Colon cancer Neg Hx     Ovarian cancer Neg Hx      Social History     Tobacco Use    Smoking status: Never Smoker    Smokeless tobacco: Never Used   Substance Use Topics    Alcohol use: No    Drug use: No     Review of Systems   Constitutional: Positive for fatigue. Negative for chills and fever.   HENT: Negative for congestion and sore throat.    Eyes: Negative for visual disturbance.   Respiratory: Positive for cough. Negative for shortness of breath.    Cardiovascular: Negative for chest pain and leg swelling.   Gastrointestinal: Negative for abdominal pain, diarrhea, nausea and  vomiting.   Genitourinary: Negative for dysuria and vaginal discharge.   Skin: Negative for rash.   Neurological: Negative for headaches.   Psychiatric/Behavioral: Negative for decreased concentration.       Physical Exam     Initial Vitals [03/09/22 0952]   BP Pulse Resp Temp SpO2   (!) 162/113 86 18 98.2 °F (36.8 °C) 96 %      MAP       --         Physical Exam    Nursing note and vitals reviewed.  Constitutional: She appears well-developed and well-nourished. She is not diaphoretic. No distress.   HENT:   Head: Atraumatic.   Right Ear: External ear normal.   Left Ear: External ear normal.   Mouth/Throat: Oropharynx is clear and moist.   Eyes: Conjunctivae and EOM are normal. Pupils are equal, round, and reactive to light.   Neck: Neck supple. No tracheal deviation present.   Normal range of motion.  Cardiovascular: Regular rhythm. Tachycardia present.    Atrial flutter ranging from 110-130 bpm   Pulmonary/Chest: Breath sounds normal. No accessory muscle usage or stridor. No tachypnea. No respiratory distress.   Very faint diffuse expiratory wheeze with rales   Abdominal: Abdomen is soft. She exhibits no distension. There is no abdominal tenderness. There is no guarding.   Musculoskeletal:         General: No edema. Normal range of motion.      Cervical back: Normal range of motion and neck supple.      Comments: No lower extremity swelling or tenderness.     Neurological: She is alert and oriented to person, place, and time. She displays no tremor. She displays no seizure activity. Coordination and gait normal.   Skin: Skin is warm, dry and intact. Capillary refill takes less than 2 seconds. No rash noted. No erythema.   Psychiatric: She has a normal mood and affect.         ED Course   Procedures  Labs Reviewed   CBC W/ AUTO DIFFERENTIAL - Abnormal; Notable for the following components:       Result Value    MCH 26.9 (*)     MCHC 30.8 (*)     All other components within normal limits   D DIMER, QUANTITATIVE -  Abnormal; Notable for the following components:    D-Dimer 0.69 (*)     All other components within normal limits   COMPREHENSIVE METABOLIC PANEL   TROPONIN I   B-TYPE NATRIURETIC PEPTIDE   PROTIME-INR   C-REACTIVE PROTEIN   PROCALCITONIN   LACTIC ACID, PLASMA   TSH   MAGNESIUM   LIPID PANEL   SARS-COV-2 RDRP GENE   POCT INFLUENZA A/B MOLECULAR     EKG Readings: (Independently Interpreted)   Initial Reading: No STEMI. Rhythm: Atrial Flutter. Heart Rate: 107. Axis: Normal.       Imaging Results          X-Ray Chest AP Portable (Final result)  Result time 03/09/22 11:25:13    Final result by David Archer MD (03/09/22 11:25:13)                 Impression:      See above      Electronically signed by: David Archer MD  Date:    03/09/2022  Time:    11:25             Narrative:    EXAMINATION:  XR CHEST AP PORTABLE    CLINICAL HISTORY:  Chest Pain;    TECHNIQUE:  Single frontal view of the chest was performed.    COMPARISON:  01/12/2022    FINDINGS:  Cardiac size is normal.  Lungs are clear.  No infiltrate is seen.                                 Medications   diltiaZEM 125 mg in D5W 125 mL infusion (5 mg/hr Intravenous New Bag 3/9/22 1155)     Medical Decision Making:   History:   Old Medical Records: I decided to obtain old medical records.  ED Management:  Patient is found to be in new onset atrial flutter 110-130 beats per minute.  She primarily presented for 1-1.5 week history of worsening cough and is setting of chronic cough.  Will report chest pain only when actively coughing.  Faint wheezing with rales on exam.  Does not appear to be volume overloaded.  Case has been reviewed immediately with my attending and workup has been initiated.     Workup overall reassuring.  Chest x-ray shows no effusions or pneumonia.  BNP and TSH normal.  Troponin and age adjusted D-dimer normal.  No anemia or leukocytosis.  No significant electrolyte or metabolic disturbance.  COVID-19 and flu negative.  Inflammatory  markers normal.  Case reviewed with my attending who will plan to discuss case with cardiology.  Other:   I have discussed this case with another health care provider.       <> Summary of the Discussion: Reviewed with attending, Dr. Jody Da Silva Attestation:   Rekha #1: I performed the above scribed service and the documentation accurately describes the services I performed. I attest to the accuracy of the note.    Attending Attestation:         Attending Critical Care:   Critical Care Times:   Direct Patient Care (initial evaluation, reassessments, and time considering the case)................................................................10 minutes.   Additional History from reviewing old medical records or taking additional history from the family, EMS, PCP, etc.......................8 minutes.   Ordering, Reviewing, and Interpreting Diagnostic Studies...............................................................................................................6 minutes.   Documentation..................................................................................................................................................................................5 minutes.   Consultation with other Physicians. .................................................................................................................................................3 minutes.   Consultation with the patient's family directly relating to the patient's condition, care, and DNR status (when patient unable)......3 minutes.   Other..................................................................................................................................................................................................0 minutes.   ==============================================================  · Total Critical Care Time - exclusive of procedural time: 35  minutes.  ==============================================================  Critical care reasons: new onset atrial flutter.   The following critical care procedures were done by me (see procedure notes): pulse oximetry.   Critical care was time spent personally by me on the following activities: obtaining history from patient or relative, examination of patient, review of x-rays / CT sent with the patient, review of old charts, ordering lab, x-rays, and/or EKG, development of treatment plan with patient or relative, ordering and performing treatments and interventions, evaluation of patient's response to treatment, discussion with consultants and re-evaluation of patient's conition.   Critical Care Condition: potentially life-threatening           ED Course as of 03/09/22 1304   Wed Mar 09, 2022   1250 Discussed case with Dr Dillon, who recommends admission to ICU with plan for cardioversion in AM [EN]      ED Course User Index  [EN] Kristin Lugo MD             Clinical Impression:   Final diagnoses:  [R07.9] Chest pain  [I48.92] Atrial flutter, unspecified type (Primary)          ED Disposition Condition    Admit             I, Brian Alba PA-C, personally performed the services described in this documentation. All medical record entries made by the scribe were at my direction and in my presence. I have reviewed the chart and agree that the record reflects my personal performance and is accurate and complete.     Brian Alba PA-C  03/09/22 1301

## 2022-03-09 NOTE — HPI
Ms Amy Schreiber is a 81 y.o. woman with HTN who presented for cough. She has had a cough for the past 2 weeks with sputum production clear to yellow. She has been taking tessalon pearls which are not helping. No fever. No sick contacts. No chest pain. No shortness of breath. No leg swelling. Noticed fast heart rate yesterday and today which prompted her to come to hospital.     In ED, noted to have new onset A Flutter RVR. Started diltiazem gtt and admitted to ICU.

## 2022-03-09 NOTE — H&P
[facility] Medicine  History & Physical    Patient Name: Amy Schreiber  MRN: [id number]  Patient Class: IP- Inpatient  Admission Date: 3/9/2022  Attending Physician: Ariana Colby MD   Primary Care Provider: Kurt Mcdaniels MD         Patient information was obtained from patient, past medical records and ER records.     Subjective:     Principal Problem:Atrial flutter with rapid ventricular response    Chief Complaint:   Chief Complaint   Patient presents with    Cough    Back Pain    Shortness of Breath     Patient reports a productive cough, upper back pain, and sob x 2 weeks. Denies fevers, chills.         HPI: Ms Amy Schreiber is a 81 y.o. woman with HTN who presented for cough. She has had a cough for the past 2 weeks with sputum production clear to yellow. She has been taking tessalon pearls which are not helping. No fever. No sick contacts. No chest pain. No shortness of breath. No leg swelling. Noticed fast heart rate yesterday and today which prompted her to come to hospital.     In ED, noted to have new onset A Flutter RVR. Started diltiazem gtt and admitted to ICU.       Past Medical History:   Diagnosis Date    Arthritis     Chronic back pain     High cholesterol     Hypertension     Spinal stenosis     Thyroid disease        Past Surgical History:   Procedure Laterality Date    EYE SURGERY      TUBAL LIGATION      Uterine suspension         Review of patient's allergies indicates:  No Known Allergies    No current facility-administered medications on file prior to encounter.     Current Outpatient Medications on File Prior to Encounter   Medication Sig    aspirin (ECOTRIN) 81 MG EC tablet Take 1 tablet (81 mg total) by mouth once daily.    [DISCONTINUED] losartan (COZAAR) 100 MG tablet Take 100 mg by mouth once daily.    benzonatate (TESSALON) 200 MG capsule Take 200 mg by mouth 3 (three) times daily as needed for Cough.     losartan-hydrochlorothiazide 100-12.5 mg (HYZAAR) 100-12.5 mg Tab Take 1 tablet by mouth once daily.    omeprazole (PRILOSEC) 40 MG capsule Take 1 capsule (40 mg total) by mouth once daily.    [DISCONTINUED] alendronate (FOSAMAX) 70 MG tablet Take 1 tablet (70 mg total) by mouth every 7 days.    [DISCONTINUED] atorvastatin (LIPITOR) 10 MG tablet Take 1 tablet (10 mg total) by mouth once daily.    [DISCONTINUED] cetirizine (ZYRTEC) 10 MG tablet Take 1 tablet (10 mg total) by mouth once daily.    [DISCONTINUED] gabapentin (NEURONTIN) 300 MG capsule One tablet by mouth every 8 hours for leg pain.    [DISCONTINUED] hydrocortisone 2.5 % lotion Apply topically 2 (two) times daily.    [DISCONTINUED] hydrOXYzine HCl (ATARAX) 25 MG tablet Take 1 tablet (25 mg total) by mouth every 8 (eight) hours as needed for Itching.    [DISCONTINUED] levothyroxine (SYNTHROID) 50 MCG tablet Take 1 tablet (50 mcg total) by mouth once daily.    [DISCONTINUED] lidocaine (LIDODERM) 5 % Place 1 patch onto the skin once daily. Remove & Discard patch within 12 hours or as directed by MD    [DISCONTINUED] loratadine (CLARITIN) 10 mg tablet Take 1 tablet (10 mg total) by mouth once daily.    [DISCONTINUED] loteprednol (ALREX) 0.2 % DrpS 1 drop 4 (four) times daily.    [DISCONTINUED] meloxicam (MOBIC) 15 MG tablet Take 1 tablet (15 mg total) by mouth daily as needed for Pain.    [DISCONTINUED] naproxen (NAPROSYN) 500 MG tablet Take 1 tablet (500 mg total) by mouth 2 (two) times daily with meals.    [DISCONTINUED] oxycodone-acetaminophen (PERCOCET) 7.5-325 mg per tablet Take 1 tablet by mouth every 6 (six) hours as needed for Pain.    [DISCONTINUED] VITAMIN D2 50,000 unit capsule Take 50,000 Units by mouth every 30 days.     Family History    None     Unknown family history    Tobacco Use    Smoking status: Never Smoker    Smokeless tobacco: Never Used   Substance and Sexual Activity    Alcohol use: No    Drug use: No    Sexual  activity: Not Currently     Review of Systems   Constitutional:  Negative for chills, fatigue and fever.   HENT:  Negative for congestion, postnasal drip, rhinorrhea, sinus pressure, sinus pain and trouble swallowing.    Respiratory:  Positive for cough. Negative for chest tightness, shortness of breath and wheezing.    Cardiovascular:  Negative for chest pain, palpitations and leg swelling.   Gastrointestinal:  Negative for abdominal pain, constipation, diarrhea, nausea and vomiting.   Genitourinary:  Negative for difficulty urinating.   Musculoskeletal:  Negative for arthralgias and myalgias.   Skin:  Negative for rash.   Neurological:  Negative for dizziness, weakness, light-headedness, numbness and headaches.   Psychiatric/Behavioral:  Negative for confusion.    Objective:     Vital Signs (Most Recent):  Temp: 98.5 °F (36.9 °C) (03/09/22 1537)  Pulse: 72 (03/09/22 1537)  Resp: (!) 28 (03/09/22 1537)  BP: 130/79 (03/09/22 1537)  SpO2: 96 % (03/09/22 1537)   Vital Signs (24h Range):  Temp:  [98.2 °F (36.8 °C)-98.5 °F (36.9 °C)] 98.5 °F (36.9 °C)  Pulse:  [67-86] 72  Resp:  [18-30] 28  SpO2:  [96 %-99 %] 96 %  BP: (103-162)/() 130/79     Weight: 120.7 kg (266 lb 3.2 oz)  Body mass index is 45.69 kg/m².    Physical Exam  Vitals and nursing note reviewed.   Constitutional:       General: She is not in acute distress.     Appearance: She is obese. She is not ill-appearing or toxic-appearing.   HENT:      Head: Normocephalic and atraumatic.      Nose: Nose normal.      Mouth/Throat:      Mouth: Mucous membranes are moist.   Cardiovascular:      Rate and Rhythm: Tachycardia present. Rhythm irregular.      Pulses: Normal pulses.      Heart sounds: No murmur heard.    No gallop.      Comments: Atrial flutter on telemetry  Pulmonary:      Effort: Pulmonary effort is normal. No respiratory distress.      Breath sounds: Normal breath sounds. No wheezing or rales.      Comments: Room air  Abdominal:      General: Bowel  sounds are normal. There is no distension.      Palpations: Abdomen is soft.      Tenderness: There is no abdominal tenderness. There is no guarding.   Musculoskeletal:      Right lower leg: Edema (trace) present.      Left lower leg: Edema (trace) present.   Skin:     Findings: No rash.   Neurological:      Mental Status: She is alert and oriented to person, place, and time.           Significant Labs: All pertinent labs within the past 24 hours have been reviewed.    Significant Imaging: I have reviewed all pertinent imaging results/findings within the past 24 hours.    Assessment/Plan:     * Atrial flutter with rapid ventricular response  Presented with new onset A Flutter  With cough which may have triggered    TTE normal EF  TSH normal (off levothyroxine)    Started diltiazem gtt  Started eliquis  NPO for GALEN/DCCV in AM    [id number] - VASc score:  Congestive Heart Failure or EF < 35% NO   Hypertension YES  +1   Age >= 75 YES  +2   Diabetes Mellitus NO   Stroke/TIA prior history NO   Vascular disease (PAD, MI or Aortic Plaque)? YES  +1   Age 64 - 74 NO   Female YES  +1   Total 5             Aortic atherosclerosis  Not on statin at home        Morbid obesity with BMI of 40.0-44.9, adult  Body mass index is 45.69 kg/m². Morbid obesity complicates all aspects of disease management from diagnostic modalities to treatment. Weight loss encouraged and health benefits explained to patient.         Hyperlipidemia  Not on statin at home       Chronic cough  No infiltrate on CXR. Afebrile. No leukocytosis. No CHF.   - cough rx  - levalbuterol nebs      Hypertension, benign  On losartan-HCTZ at home- continue         VTE Risk Mitigation (From admission, onward)         Ordered     apixaban tablet 5 mg  2 times daily         03/09/22 1330              Critical care time spent on the evaluation and treatment of severe organ dysfunction, review of pertinent labs and imaging studies, discussions with consulting providers and  discussions with patient/family: 45 minutes.     Ariana Colby MD  Department of Hospital Medicine   [facility] - Intensive Care

## 2022-03-10 ENCOUNTER — ANESTHESIA EVENT (OUTPATIENT)
Dept: CARDIOLOGY | Facility: HOSPITAL | Age: 82
DRG: 309 | End: 2022-03-10
Payer: MEDICARE

## 2022-03-10 ENCOUNTER — ANESTHESIA (OUTPATIENT)
Dept: CARDIOLOGY | Facility: HOSPITAL | Age: 82
DRG: 309 | End: 2022-03-10
Payer: MEDICARE

## 2022-03-10 VITALS
SYSTOLIC BLOOD PRESSURE: 112 MMHG | HEART RATE: 77 BPM | HEIGHT: 64 IN | TEMPERATURE: 98 F | WEIGHT: 266.19 LBS | OXYGEN SATURATION: 96 % | BODY MASS INDEX: 45.44 KG/M2 | RESPIRATION RATE: 30 BRPM | DIASTOLIC BLOOD PRESSURE: 62 MMHG

## 2022-03-10 LAB
ALBUMIN SERPL BCP-MCNC: 3.3 G/DL (ref 3.5–5.2)
ALP SERPL-CCNC: 60 U/L (ref 55–135)
ALT SERPL W/O P-5'-P-CCNC: 20 U/L (ref 10–44)
ANION GAP SERPL CALC-SCNC: 6 MMOL/L (ref 8–16)
AST SERPL-CCNC: 18 U/L (ref 10–40)
BASOPHILS # BLD AUTO: 0.04 K/UL (ref 0–0.2)
BASOPHILS NFR BLD: 0.6 % (ref 0–1.9)
BILIRUB SERPL-MCNC: 0.5 MG/DL (ref 0.1–1)
BSA FOR ECHO PROCEDURE: 2.33 M2
BUN SERPL-MCNC: 13 MG/DL (ref 8–23)
CALCIUM SERPL-MCNC: 8.8 MG/DL (ref 8.7–10.5)
CHLORIDE SERPL-SCNC: 104 MMOL/L (ref 95–110)
CO2 SERPL-SCNC: 26 MMOL/L (ref 23–29)
CREAT SERPL-MCNC: 0.8 MG/DL (ref 0.5–1.4)
DIFFERENTIAL METHOD: ABNORMAL
EJECTION FRACTION: 55 %
EOSINOPHIL # BLD AUTO: 0.1 K/UL (ref 0–0.5)
EOSINOPHIL NFR BLD: 1.3 % (ref 0–8)
ERYTHROCYTE [DISTWIDTH] IN BLOOD BY AUTOMATED COUNT: 14.2 % (ref 11.5–14.5)
EST. GFR  (AFRICAN AMERICAN): >60 ML/MIN/1.73 M^2
EST. GFR  (NON AFRICAN AMERICAN): >60 ML/MIN/1.73 M^2
GLUCOSE SERPL-MCNC: 108 MG/DL (ref 70–110)
HCT VFR BLD AUTO: 38.9 % (ref 37–48.5)
HGB BLD-MCNC: 11.1 G/DL (ref 12–16)
IMM GRANULOCYTES # BLD AUTO: 0.02 K/UL (ref 0–0.04)
IMM GRANULOCYTES NFR BLD AUTO: 0.3 % (ref 0–0.5)
LYMPHOCYTES # BLD AUTO: 2.6 K/UL (ref 1–4.8)
LYMPHOCYTES NFR BLD: 40.9 % (ref 18–48)
MAGNESIUM SERPL-MCNC: 2 MG/DL (ref 1.6–2.6)
MCH RBC QN AUTO: 27 PG (ref 27–31)
MCHC RBC AUTO-ENTMCNC: 28.5 G/DL (ref 32–36)
MCV RBC AUTO: 95 FL (ref 82–98)
MONOCYTES # BLD AUTO: 0.4 K/UL (ref 0.3–1)
MONOCYTES NFR BLD: 5.9 % (ref 4–15)
NEUTROPHILS # BLD AUTO: 3.2 K/UL (ref 1.8–7.7)
NEUTROPHILS NFR BLD: 51 % (ref 38–73)
NRBC BLD-RTO: 0 /100 WBC
PHOSPHATE SERPL-MCNC: 3.8 MG/DL (ref 2.7–4.5)
PLATELET # BLD AUTO: ABNORMAL K/UL (ref 150–450)
PLATELET BLD QL SMEAR: ABNORMAL
PMV BLD AUTO: ABNORMAL FL (ref 9.2–12.9)
POTASSIUM SERPL-SCNC: 4.5 MMOL/L (ref 3.5–5.1)
PROT SERPL-MCNC: 6.7 G/DL (ref 6–8.4)
RBC # BLD AUTO: 4.11 M/UL (ref 4–5.4)
SINUS: 3.3 CM
SODIUM SERPL-SCNC: 136 MMOL/L (ref 136–145)
WBC # BLD AUTO: 6.29 K/UL (ref 3.9–12.7)

## 2022-03-10 PROCEDURE — 94640 AIRWAY INHALATION TREATMENT: CPT

## 2022-03-10 PROCEDURE — D9220A PRA ANESTHESIA: Mod: ,,, | Performed by: ANESTHESIOLOGY

## 2022-03-10 PROCEDURE — 36415 COLL VENOUS BLD VENIPUNCTURE: CPT | Performed by: INTERNAL MEDICINE

## 2022-03-10 PROCEDURE — 63600175 PHARM REV CODE 636 W HCPCS: Performed by: STUDENT IN AN ORGANIZED HEALTH CARE EDUCATION/TRAINING PROGRAM

## 2022-03-10 PROCEDURE — 85025 COMPLETE CBC W/AUTO DIFF WBC: CPT | Performed by: INTERNAL MEDICINE

## 2022-03-10 PROCEDURE — 94761 N-INVAS EAR/PLS OXIMETRY MLT: CPT

## 2022-03-10 PROCEDURE — D9220A PRA ANESTHESIA: ICD-10-PCS | Mod: ,,, | Performed by: ANESTHESIOLOGY

## 2022-03-10 PROCEDURE — 80053 COMPREHEN METABOLIC PANEL: CPT | Performed by: INTERNAL MEDICINE

## 2022-03-10 PROCEDURE — 84100 ASSAY OF PHOSPHORUS: CPT | Performed by: INTERNAL MEDICINE

## 2022-03-10 PROCEDURE — 25000003 PHARM REV CODE 250: Performed by: INTERNAL MEDICINE

## 2022-03-10 PROCEDURE — 25000003 PHARM REV CODE 250: Performed by: STUDENT IN AN ORGANIZED HEALTH CARE EDUCATION/TRAINING PROGRAM

## 2022-03-10 PROCEDURE — 25000242 PHARM REV CODE 250 ALT 637 W/ HCPCS: Performed by: STUDENT IN AN ORGANIZED HEALTH CARE EDUCATION/TRAINING PROGRAM

## 2022-03-10 PROCEDURE — 37000009 HC ANESTHESIA EA ADD 15 MINS: Performed by: INTERNAL MEDICINE

## 2022-03-10 PROCEDURE — 92960 CARDIOVERSION ELECTRIC EXT: CPT | Performed by: INTERNAL MEDICINE

## 2022-03-10 PROCEDURE — 25000003 PHARM REV CODE 250: Performed by: HOSPITALIST

## 2022-03-10 PROCEDURE — A4216 STERILE WATER/SALINE, 10 ML: HCPCS | Performed by: HOSPITALIST

## 2022-03-10 PROCEDURE — 83735 ASSAY OF MAGNESIUM: CPT | Performed by: INTERNAL MEDICINE

## 2022-03-10 PROCEDURE — 37000008 HC ANESTHESIA 1ST 15 MINUTES: Performed by: INTERNAL MEDICINE

## 2022-03-10 PROCEDURE — 25000242 PHARM REV CODE 250 ALT 637 W/ HCPCS: Performed by: HOSPITALIST

## 2022-03-10 RX ORDER — KETAMINE HYDROCHLORIDE 100 MG/ML
INJECTION, SOLUTION INTRAMUSCULAR; INTRAVENOUS
Status: DISCONTINUED | OUTPATIENT
Start: 2022-03-10 | End: 2022-03-10

## 2022-03-10 RX ORDER — METOPROLOL SUCCINATE 50 MG/1
50 TABLET, EXTENDED RELEASE ORAL DAILY
Qty: 90 TABLET | Refills: 3 | Status: SHIPPED | OUTPATIENT
Start: 2022-03-11 | End: 2022-03-25

## 2022-03-10 RX ORDER — ALBUTEROL SULFATE 90 UG/1
AEROSOL, METERED RESPIRATORY (INHALATION)
Status: DISCONTINUED | OUTPATIENT
Start: 2022-03-10 | End: 2022-03-10

## 2022-03-10 RX ORDER — LIDOCAINE HYDROCHLORIDE 20 MG/ML
INJECTION INTRAVENOUS
Status: DISCONTINUED | OUTPATIENT
Start: 2022-03-10 | End: 2022-03-10

## 2022-03-10 RX ORDER — PROPOFOL 10 MG/ML
VIAL (ML) INTRAVENOUS
Status: DISCONTINUED | OUTPATIENT
Start: 2022-03-10 | End: 2022-03-10

## 2022-03-10 RX ORDER — METOPROLOL SUCCINATE 50 MG/1
50 TABLET, EXTENDED RELEASE ORAL DAILY
Status: DISCONTINUED | OUTPATIENT
Start: 2022-03-10 | End: 2022-03-10 | Stop reason: HOSPADM

## 2022-03-10 RX ADMIN — ALBUTEROL SULFATE 2 PUFF: 90 AEROSOL, METERED RESPIRATORY (INHALATION) at 10:03

## 2022-03-10 RX ADMIN — PROPOFOL 30 MG: 10 INJECTION, EMULSION INTRAVENOUS at 10:03

## 2022-03-10 RX ADMIN — APIXABAN 5 MG: 5 TABLET, FILM COATED ORAL at 09:03

## 2022-03-10 RX ADMIN — PANTOPRAZOLE SODIUM 40 MG: 40 TABLET, DELAYED RELEASE ORAL at 09:03

## 2022-03-10 RX ADMIN — LOSARTAN POTASSIUM 100 MG: 25 TABLET, FILM COATED ORAL at 09:03

## 2022-03-10 RX ADMIN — SENNOSIDES AND DOCUSATE SODIUM 1 TABLET: 50; 8.6 TABLET ORAL at 09:03

## 2022-03-10 RX ADMIN — Medication 10 ML: at 03:03

## 2022-03-10 RX ADMIN — GUAIFENESIN 200 MG: 100 SOLUTION ORAL at 11:03

## 2022-03-10 RX ADMIN — PROPOFOL 20 MG: 10 INJECTION, EMULSION INTRAVENOUS at 10:03

## 2022-03-10 RX ADMIN — LIDOCAINE HYDROCHLORIDE 100 MG: 20 INJECTION, SOLUTION INTRAVENOUS at 10:03

## 2022-03-10 RX ADMIN — LEVALBUTEROL HYDROCHLORIDE 1.25 MG: 1.25 SOLUTION, CONCENTRATE RESPIRATORY (INHALATION) at 12:03

## 2022-03-10 RX ADMIN — LEVALBUTEROL HYDROCHLORIDE 1.25 MG: 1.25 SOLUTION, CONCENTRATE RESPIRATORY (INHALATION) at 07:03

## 2022-03-10 RX ADMIN — HYDROCHLOROTHIAZIDE 12.5 MG: 12.5 TABLET ORAL at 09:03

## 2022-03-10 RX ADMIN — SODIUM CHLORIDE, SODIUM LACTATE, POTASSIUM CHLORIDE, AND CALCIUM CHLORIDE: .6; .31; .03; .02 INJECTION, SOLUTION INTRAVENOUS at 10:03

## 2022-03-10 RX ADMIN — LEVALBUTEROL HYDROCHLORIDE 1.25 MG: 1.25 SOLUTION, CONCENTRATE RESPIRATORY (INHALATION) at 03:03

## 2022-03-10 RX ADMIN — KETAMINE HYDROCHLORIDE 10 MG: 100 INJECTION, SOLUTION, CONCENTRATE INTRAMUSCULAR; INTRAVENOUS at 10:03

## 2022-03-10 RX ADMIN — Medication 10 ML: at 06:03

## 2022-03-10 RX ADMIN — METOPROLOL SUCCINATE 50 MG: 50 TABLET, EXTENDED RELEASE ORAL at 12:03

## 2022-03-10 RX ADMIN — PROPOFOL 50 MG: 10 INJECTION, EMULSION INTRAVENOUS at 10:03

## 2022-03-10 NOTE — NURSING
Patient remains on in ICU overnight with no acute events. Cardizem restarted after being off for several hours, due to HR>100. SBP 130s-160s for majority of shift. No chest pain or SOB reported from patient.

## 2022-03-10 NOTE — DISCHARGE SUMMARY
[facility] Medicine  Discharge Summary      Patient Name: Amy Schreiber  MRN: [id number]  Patient Class: IP- Inpatient  Admission Date: 3/9/2022  Hospital Length of Stay: 1 days  Discharge Date and Time:  03/10/2022 3:28 PM  Attending Physician: Ariana Colby MD   Discharging Provider: Ariana Colby MD  Primary Care Provider: Kurt Mcdaniels MD      HPI:   Ms Amy Schreiber is a 81 y.o. woman with HTN who presented for cough. She has had a cough for the past 2 weeks with sputum production clear to yellow. She has been taking tessalon pearls which are not helping. No fever. No sick contacts. No chest pain. No shortness of breath. No leg swelling. Noticed fast heart rate yesterday and today which prompted her to come to hospital.     In ED, noted to have new onset A Flutter RVR. Started diltiazem gtt and admitted to ICU.       Procedure(s) (LRB):  Transesophageal echo (GALEN) intra-procedure log documentation (N/A)  Cardioversion or Defibrillation (N/A)      Hospital Course:   Admitted with cough x2 weeks and Aflutter RVR. No pneumonia, no CHF. Suspect bronchitis. Started diltiazem gtt with improvement in rate but did not convert. GALEN/DCCV 3/10 with conversion to NSR. Started metoprolol and eliquis. Medications and Aflutter discussed with patient in S[place]. All questions answered. Cough improving. Stable for discharge to home with Cardiology follow up.        Goals of Care Treatment Preferences:  Code Status: Full Code      Consults:   Consults (From admission, onward)        Status Ordering Provider     Inpatient consult to Social Work  Once        Provider:  (Not yet assigned)    Acknowledged DAVID LAGOS     Inpatient consult to Cardiology  Once        Provider:  David Lagos MD    Completed NIX, EDGAR D          No new Assessment & Plan notes have been filed under this hospital service since the last note was generated.  Service: Hospital Medicine    Final  Active Diagnoses:    Diagnosis Date Noted POA    PRINCIPAL PROBLEM:  Atrial flutter with rapid ventricular response [I48.92] 03/09/2022 Yes    Aortic atherosclerosis [I70.0] 03/09/2022 Yes    Morbid obesity with BMI of 40.0-44.9, adult [E66.01, Z68.41] 10/25/2016 Not Applicable    Hyperlipidemia [E78.5] 08/22/2016 Yes    Hypertension, benign [I10] 05/04/2016 Yes    Chronic cough [R05.3] 05/04/2016 Yes      Problems Resolved During this Admission:       Discharged Condition: good    Disposition: Home or Self Care    Follow Up: Cardiology     Patient Instructions:      Diet Cardiac     Notify your health care provider if you experience any of the following:  temperature >100.4     Notify your health care provider if you experience any of the following:  persistent nausea and vomiting or diarrhea     Notify your health care provider if you experience any of the following:  severe uncontrolled pain     Notify your health care provider if you experience any of the following:  redness, tenderness, or signs of infection (pain, swelling, redness, odor or green/yellow discharge around incision site)     Notify your health care provider if you experience any of the following:  difficulty breathing or increased cough     Notify your health care provider if you experience any of the following:  severe persistent headache     Notify your health care provider if you experience any of the following:  worsening rash     Notify your health care provider if you experience any of the following:  persistent dizziness, light-headedness, or visual disturbances     Notify your health care provider if you experience any of the following:  increased confusion or weakness     Activity as tolerated       Significant Diagnostic Studies: Labs: All labs within the past 24 hours have been reviewed    Pending Diagnostic Studies:     None         Medications:  Reconciled Home Medications:      Medication List      START taking these  medications    apixaban 5 mg Tab  Commonly known as: ELIQUIS  [place] lauri tableta (5 mg en total) por vía oral 2 veces al día.  (Take 1 tablet (5 mg total) by mouth 2 (two) times daily.)     metoprolol succinate 50 MG 24 hr tablet  Commonly known as: TOPROL-XL  [place] lauri tableta (50 mg en total) por vía oral diariamente.  (Take 1 tablet (50 mg total) by mouth once daily.)  Start taking on: March 11, 2022        CHANGE how you take these medications    losartan-hydrochlorothiazide 100-12.5 mg 100-12.5 mg Tab  Commonly known as: HYZAAR  Take 1 tablet by mouth once daily.  What changed: Another medication with the same name was removed. Continue taking this medication, and follow the directions you see here.        CONTINUE taking these medications    omeprazole 40 MG capsule  Commonly known as: PriLOSEC  Take 1 capsule (40 mg total) by mouth once daily.        STOP taking these medications    aspirin 81 MG EC tablet  Commonly known as: ECOTRIN     benzonatate 200 MG capsule  Commonly known as: TESSALON     loratadine 10 mg tablet  Commonly known as: CLARITIN            Indwelling Lines/Drains at time of discharge:   Lines/Drains/Airways     None                 Time spent on the discharge of patient: 35 minutes        Ariana Colby MD  Department of Hospital Medicine  [facility] - Intensive Care

## 2022-03-10 NOTE — PLAN OF CARE
[facility] - Intensive Care  Discharge Final Note    Primary Care Provider: Kurt Mcdaniels MD    Expected Discharge Date: 3/10/2022  SW gave patient's daughter, Juana, pt's follow up appointment for PCP clinic and cardiology. SW discussed patient responsibilities for MANAGING YOUR HEALTH AT HOME  EDUCATION:  Things You are responsible For To Manage Your Care At Home:  1.    Getting your prescriptions filled   2.    Taking your medications as directed, DO NOT MISS ANY DOSES!  3.    Going to your follow-up doctor appointment. This is important because it  allow the doctor to monitor your progress and determine if  any changes need to made to your treatment plan.  Call Scheduling [employer] Help at home number for new or repeated problems / symptoms   Call 911 for CP and / or SOB  SW informed nurse, Nadira, that patient is ready for discharge from case management standpoint.     Final Discharge Note (most recent)       Final Note - 03/10/22 1623          Final Note    Assessment Type Final Discharge Note     Anticipated Discharge Disposition Home or Self Care     What phone number can be called within the next 1-3 days to see how you are doing after discharge? [phone]     Hospital Resources/Appts/Education Provided Provided patient/caregiver with written discharge plan information;Appointments scheduled and added to AVS        Post-Acute Status    Post-Acute Authorization Other     Coverage [facility]     Other Status No Post-Acute Service Needs     Discharge Delays None known at this time                     Important Message from Medicare             Contact Info       [facility]    [address]  [place] [address]   Phone: [phone]       Next Steps: Schedule an appointment as soon as possible for a visit in 1 week(s)    Rebecca Barroso MD   Specialty: Cardiology, Interventional Cardiology    [address]  [address]  [place] [address]   Phone: [phone]       Next Steps: Follow up on 3/22/2022     Instructions: Appointment scheduled for 2:40 p.m.

## 2022-03-10 NOTE — PLAN OF CARE
[facility] - Intensive Care  Initial Discharge Assessment       Primary Care Provider: Kurt Mcdaniels MD    Admission Diagnosis: Atrial flutter [I48.92]  Atrial flutter with rapid ventricular response [I48.92]  Chest pain [R07.9]  Atrial flutter, unspecified type [I48.92]    Admission Date: 3/9/2022  Expected Discharge Date: 3/10/2022    Discharge Barriers Identified: None    Payor: [facility] MANAGED MCARE / Plan: [facility] DUAL COMPLETE HMO SNP / Product Type: Medicare Advantage /     Extended Emergency Contact Information  Primary Emergency Contact: Judith Yang  Address: [address] [place] [address] [place]  Mobile Phone: [phone]  Relation: Daughter  Secondary Emergency Contact: Darien Schreiber  Address: [address] [place] [address] [address]  Home Phone: [phone]  Mobile Phone: [phone]  Relation: Spouse    Discharge Plan A: Home with family         [id number] [place], [place] - [address]  [address] [address]  Phone: [phone] Fax: [phone]    [facility] [address]  [address]   [place] [address]  Phone: [phone] Fax: [phone]      Initial Assessment (most recent)       Adult Discharge Assessment - 03/10/22 1619          Discharge Assessment    Assessment Type Discharge Planning Assessment     Confirmed/corrected address, phone number and insurance Yes     Confirmed Demographics Correct on Facesheet     Source of Information patient;family     If unable to respond/provide information was family/caregiver contacted? Yes     Contact Name/Number Juana [phone]     Communicated DOE with patient/caregiver Yes     Reason For Admission atrial flutter     Lives With grandchild(seferino)     Facility Arrived From: home     Do you expect to return to your current living situation? Yes     Do you have help at home or someone  to help you manage your care at home? Yes     Current cognitive status: Alert/Oriented     Walking or Climbing Stairs Difficulty ambulation difficulty, assistance 1 person     Dressing/Bathing Difficulty bathing difficulty, requires equipment;dressing difficulty, assistance 1 person     Equipment Currently Used at Home none     Readmission within 30 days? No     Patient currently being followed by outpatient case management? No     Do you currently have service(s) that help you manage your care at home? No     Do you take prescription medications? Yes     Do you have prescription coverage? Yes     Coverage [facility]     Do you have any problems affording any of your prescribed medications? No     Is the patient taking medications as prescribed? yes     Who is going to help you get home at discharge? Juana (daughter)     How do you get to doctors appointments? family or friend will provide     Are you on dialysis? No     Do you take coumadin? No     Discharge Plan A Home with family     DME Needed Upon Discharge  none     Discharge Plan discussed with: Patient;Adult children     Discharge Barriers Identified None

## 2022-03-10 NOTE — PLAN OF CARE
[facility] CASE MANAGEMENT                  WRITTEN DISCHARGE INFORMATION      APPOINTMENTS AND RESOURCES TO HELP YOU MANAGE YOUR CARE AT HOME BASED ON YOUR PREFERENCES:   Follow-up Information     [facility] [place]. Schedule an appointment as soon as possible for a visit in 1 week(s).    Contact information:  [address] [place] [address]  [phone]             Rebecca Barroso MD Follow up on 3/22/2022.    Specialties: Cardiology, Interventional Cardiology  Why: Appointment scheduled for 2:40 p.m.  Contact information:  [address]  [address]  [place]  [phone]                         Healthy Living Instructions to HELP MANAGE YOUR CARE AT HOME:  Things You are responsible for:  1.    Getting your prescriptions filled   2.    Taking your medications as directed, DO NOT MISS ANY DOSES!  3.    Following the diet and exercise recommended by your doctor  4.    Going to your follow-up doctor appointment. This is important because it allows the doctor to monitor your progress and determine if any changes need to made to your treatment plan.  5. If you have any questions about MANAGING YOUR CARE AT HOME Call the Nurse Care Line for 24/7 Assistance [phone]       Please answer any calls you may receive from [facility]. We want to continue to support you as you manage your healthcare needs. [facility] is happy to have the opportunity to serve you.      Thank you for choosing [facility] for your healthcare needs!  Your [facility] Case Management Team,    Jordyn Lawrecne   II  Care Management

## 2022-03-10 NOTE — ANESTHESIA POSTPROCEDURE EVALUATION
Anesthesia Post Evaluation    Patient: Amy Schreiber    Procedure(s) Performed: Procedure(s) (LRB):  Transesophageal echo (GALEN) intra-procedure log documentation (N/A)  Cardioversion or Defibrillation (N/A)    Final Anesthesia Type: MAC      Patient location during evaluation: floor  Patient participation: Yes- Able to Participate  Level of consciousness: awake and alert  Post-procedure vital signs: reviewed and stable  Pain management: adequate  Airway patency: patent    PONV status at discharge: No PONV  Anesthetic complications: no      Cardiovascular status: blood pressure returned to baseline  Respiratory status: unassisted and spontaneous ventilation  Hydration status: euvolemic  Follow-up not needed.          Vitals Value Taken Time   /67 03/10/22 1431   Temp 36.5 °C (97.7 °F) 03/10/22 1130   Pulse 82 03/10/22 1442   Resp 17 03/10/22 1442   SpO2 98 % 03/10/22 1442   Vitals shown include unvalidated device data.      No case tracking events are documented in the log.      Pain/Taylor Score: Pain Rating Prior to Med Admin: 0 (3/9/2022  6:00 PM)  Pain Rating Post Med Admin: 0 (3/9/2022  6:00 PM)

## 2022-03-10 NOTE — HOSPITAL COURSE
Admitted with cough x2 weeks and Aflutter RVR. No pneumonia, no CHF. Suspect bronchitis. Started diltiazem gtt with improvement in rate but did not convert. GALEN/DCCV 3/10 with conversion to NSR. Started metoprolol and eliquis. Medications and Aflutter discussed with patient in [place]an. All questions answered. Cough improving. Stable for discharge to home with Cardiology follow up.

## 2022-03-10 NOTE — BRIEF OP NOTE
[facility]  Brief Operative Note     SUMMARY     Surgery Date: 3/10/2022     Surgeon(s) and Role:     * David Dillon MD - Primary    Assisting Surgeon: None    Pre-op Diagnosis:  Atrial flutter with rapid ventricular response [I48.92]    Post-op Diagnosis:  Post-Op Diagnosis Codes:     * Atrial flutter with rapid ventricular response [I48.92]    Procedure(s) (LRB):  Transesophageal echo (GALEN) intra-procedure log documentation (N/A)  Cardioversion or Defibrillation (N/A)    Anesthesia: Monitor Anesthesia Care    Description of the findings of the procedure: uneventful GALEN/DCCV with anesthesia    Findings/Key Components:   Normal biven size/fxn  LVEF 55%  Normal wall motion  Biatrial enlargement  No LA/LILLY thrombus  Normal valves  Trace AI/TR    Successful DCCV AFL->SR 90s 75J x1    Pt tolerated procedure well without complications.    Plan:  Cont eliquis 5mg bid  Start toprol XL 50mg qd  Dispo planning appropriate  Follow up in office    Estimated Blood Loss: <50cc         Specimens: None    Diet: cardiac    Activity: ad ana.

## 2022-03-10 NOTE — ANESTHESIA PREPROCEDURE EVALUATION
03/10/2022  Amy Schreiber is a 81 y.o., female.  Pre-operative evaluation for Procedure(s) (LRB):  Transesophageal echo (GALEN) intra-procedure log documentation (N/A)  Cardioversion or Defibrillation (N/A)    NPO >8  Aflutter on Diltiazem gtt at 2.5 mg/hr. Eliquis started 3/9.    Vitals:    03/10/22 0651 03/10/22 0701 03/10/22 0730 03/10/22 0756   BP: (!) 141/76 (!) 149/104 131/61    BP Location:  Left arm     Patient Position:  Lying     Pulse: 73 90 72 69   Resp: (!) 23 (!) 38 (!) 21 (!) 26   Temp:  36.4 °C (97.6 °F)     TempSrc:  Oral     SpO2: 95% 97% 95% 98%   Weight:       Height:             Patient Active Problem List   Diagnosis    Pelvic pain in female    Hypertension, benign    Chronic cough    Impaired fasting glucose    Hyperlipidemia    Dyslipidemia    Morbid obesity with BMI of 40.0-44.9, adult    Atrial flutter with rapid ventricular response    Aortic atherosclerosis       Review of patient's allergies indicates:  No Known Allergies    No current facility-administered medications on file prior to encounter.     Current Outpatient Medications on File Prior to Encounter   Medication Sig Dispense Refill    [DISCONTINUED] aspirin (ECOTRIN) 81 MG EC tablet Take 1 tablet (81 mg total) by mouth once daily. 150 tablet 3    losartan-hydrochlorothiazide 100-12.5 mg (HYZAAR) 100-12.5 mg Tab Take 1 tablet by mouth once daily.      omeprazole (PRILOSEC) 40 MG capsule Take 1 capsule (40 mg total) by mouth once daily. 30 capsule 5    [DISCONTINUED] loratadine (CLARITIN) 10 mg tablet Take 1 tablet (10 mg total) by mouth once daily. 15 tablet 0       Past Surgical History:   Procedure Laterality Date    EYE SURGERY      TUBAL LIGATION      Uterine suspension         Social History     Socioeconomic History    Marital status:    Tobacco Use    Smoking status: Never Smoker     Smokeless tobacco: Never Used   Substance and Sexual Activity    Alcohol use: No    Drug use: No    Sexual activity: Not Currently         CBC:   Recent Labs     03/09/22  1114 03/10/22  0411   WBC 9.04 6.29   RBC 4.91 4.11   HGB 13.2 11.1*   HCT 42.9 38.9    SEE COMMENT   MCV 87 95   MCH 26.9* 27.0   MCHC 30.8* 28.5*       CMP:   Recent Labs     03/09/22  1120 03/10/22  0411    136   K 4.6 4.5    104   CO2 29 26   BUN 13 13   CREATININE 0.8 0.8   GLU 98 108   MG 2.0 2.0   PHOS  --  3.8   CALCIUM 9.2 8.8   ALBUMIN 3.6 3.3*   PROT 7.4 6.7   ALKPHOS 69 60   ALT 22 20   AST 19 18   BILITOT 0.5 0.5       INR  Recent Labs     03/09/22  1114   INR 1.0       COVID neg    Diagnostic Studies:  CXR  NAF    EKG:  Vent. Rate : 107 BPM     Atrial Rate : 286 BPM      P-R Int : 000 ms          QRS Dur : 072 ms       QT Int : 358 ms       P-R-T Axes : 086 038 -78 degrees      QTc Int : 477 ms     Atrial flutter with variable A-V block   ST and T wave abnormality, consider inferior ischemia   ST and T wave abnormality, consider anterolateral ischemia   Abnormal ECG   When compared with ECG of 28-AUG-2016 21:51,   Significant changes have occurred   Confirmed by David Dillon MD ([id number]) on 3/9/2022 9:30:59 PM     2D Echo:  No results found for this or any previous visit.  TTE 3/9/22  · The left ventricle is normal in size with mild concentric hypertrophy and low normal systolic function.  · The estimated ejection fraction is 50%.  · Mild right ventricular enlargement with low normal right ventricular systolic function.  · The estimated PA systolic pressure is 18 mmHg.  · Atrial flutter observed.          Pre-op Assessment    I have reviewed the Patient Summary Reports.     I have reviewed the Nursing Notes. I have reviewed the NPO Status.   I have reviewed the Medications.     Review of Systems  Anesthesia Hx:  No problems with previous Anesthesia  History of prior surgery of interest to airway management or  planning:  Denies Personal Hx of Anesthesia complications.   Social:  Non-Smoker    Hematology/Oncology:  Hematology Normal   Oncology Normal     EENT/Dental:EENT/Dental Normal   Cardiovascular:   Hypertension Dysrhythmias (A flutter on eliquis ) atrial fibrillation ECG has been reviewed.    Pulmonary:  Pulmonary Normal    Renal/:  Renal/ Normal     Hepatic/GI:   GERD    Neurological:  Neurology Normal    Endocrine:  Morbid Obesity / BMI > 40      Physical Exam  General: Well nourished        Anesthesia Plan  Type of Anesthesia, risks & benefits discussed:    Anesthesia Type: Gen Natural Airway, MAC, Gen ETT  Intra-op Monitoring Plan: Standard ASA Monitors  Induction:  IV  Airway Plan: Video  Informed Consent: Informed consent signed with the Patient and all parties understand the risks and agree with anesthesia plan.  All questions answered.   ASA Score: 3  Day of Surgery Review of History & Physical: H&P Update referred to the surgeon/provider.    Ready For Surgery From Anesthesia Perspective.     .

## 2022-03-10 NOTE — TRANSFER OF CARE
"Anesthesia Transfer of Care Note    Patient: Amy Schreiber    Procedure(s) Performed: Procedure(s) (LRB):  Transesophageal echo (GALEN) intra-procedure log documentation (N/A)  Cardioversion or Defibrillation (N/A)    Patient location: Other: RN nurse - transport to ICU     Anesthesia Type: MAC    Transport from OR: Transported from OR on 2-3 L/min O2 by NC with adequate spontaneous ventilation    Post pain: adequate analgesia    Post assessment: no apparent anesthetic complications and tolerated procedure well    Post vital signs: stable    Level of consciousness: awake and alert    Nausea/Vomiting: no nausea/vomiting    Complications: none    Transfer of care protocol was followed      Last vitals:   Visit Vitals  BP (!) 129/59   Pulse 69   Temp 36.4 °C (97.6 °F) (Oral)   Resp (!) 23   Ht 5' 4" (1.626 m)   Wt 120.7 kg (266 lb 3.2 oz)   SpO2 95%   Breastfeeding No   BMI 45.69 kg/m²     "

## 2022-03-10 NOTE — ED PROVIDER NOTES
Encounter Date: 3/9/2022       History     Chief Complaint   Patient presents with    Cough    Back Pain    Shortness of Breath     Patient reports a productive cough, upper back pain, and sob x 2 weeks. Denies fevers, chills.      HPI  Review of patient's allergies indicates:  No Known Allergies  Past Medical History:   Diagnosis Date    Arthritis     Chronic back pain     High cholesterol     Hypertension     Spinal stenosis     Thyroid disease      Past Surgical History:   Procedure Laterality Date    EYE SURGERY      TUBAL LIGATION      Uterine suspension       Family History   Problem Relation Age of Onset    Breast cancer Neg Hx     Colon cancer Neg Hx     Ovarian cancer Neg Hx      Social History     Tobacco Use    Smoking status: Never Smoker    Smokeless tobacco: Never Used   Substance Use Topics    Alcohol use: No    Drug use: No     Review of Systems    Physical Exam     Initial Vitals [03/09/22 0952]   BP Pulse Resp Temp SpO2   (!) 162/113 86 18 98.2 °F (36.8 °C) 96 %      MAP       --         Physical Exam    ED Course   Procedures  Labs Reviewed   CBC W/ AUTO DIFFERENTIAL - Abnormal; Notable for the following components:       Result Value    MCH 26.9 (*)     MCHC 30.8 (*)     All other components within normal limits   D DIMER, QUANTITATIVE - Abnormal; Notable for the following components:    D-Dimer 0.69 (*)     All other components within normal limits   LIPID PANEL - Abnormal; Notable for the following components:    Triglycerides 151 (*)     HDL 38 (*)     All other components within normal limits   COMPREHENSIVE METABOLIC PANEL   TROPONIN I   B-TYPE NATRIURETIC PEPTIDE   PROTIME-INR   C-REACTIVE PROTEIN   PROCALCITONIN   LACTIC ACID, PLASMA   TSH   MAGNESIUM   LIPID PANEL   SARS-COV-2 RDRP GENE   POCT INFLUENZA A/B MOLECULAR        ECG Results          EKG 12-lead (Final result)  Result time 03/09/22 21:31:10    Final result by Interface, Lab In [facility] (03/09/22 21:31:10)                  Narrative:    Test Reason : R07.9,    Vent. Rate : 107 BPM     Atrial Rate : 286 BPM     P-R Int : 000 ms          QRS Dur : 072 ms      QT Int : 358 ms       P-R-T Axes : 086 038 -78 degrees     QTc Int : 477 ms    Atrial flutter with variable A-V block  ST and T wave abnormality, consider inferior ischemia  ST and T wave abnormality, consider anterolateral ischemia  Abnormal ECG  When compared with ECG of 28-AUG-2016 21:51,  Significant changes have occurred  Confirmed by David Dillon MD ([id number]) on 3/9/2022 9:30:59 PM    Referred By: BONNIE   SELF           Confirmed By:David Dillon MD                            Imaging Results          X-Ray Chest AP Portable (Final result)  Result time 03/09/22 11:25:13    Final result by David Archer MD (03/09/22 11:25:13)                 Impression:      See above      Electronically signed by: David Archer MD  Date:    03/09/2022  Time:    11:25             Narrative:    EXAMINATION:  XR CHEST AP PORTABLE    CLINICAL HISTORY:  Chest Pain;    TECHNIQUE:  Single frontal view of the chest was performed.    COMPARISON:  01/12/2022    FINDINGS:  Cardiac size is normal.  Lungs are clear.  No infiltrate is seen.                                 Medications   apixaban tablet 5 mg (5 mg Oral Given 3/10/22 0934)   losartan tablet 100 mg (100 mg Oral Given 3/10/22 0935)   hydroCHLOROthiazide tablet 12.5 mg (12.5 mg Oral Given 3/10/22 0932)   pantoprazole EC tablet 40 mg (40 mg Oral Given 3/10/22 0935)   sodium chloride 0.9% flush 10 mL (10 mLs Intravenous Given 3/10/22 0600)   melatonin tablet 6 mg (6 mg Oral Given 3/9/22 2109)   senna-docusate 8.6-50 mg per tablet 1 tablet (1 tablet Oral Given 3/10/22 0933)   acetaminophen tablet 650 mg (has no administration in time range)   potassium bicarbonate disintegrating tablet 50 mEq (has no administration in time range)   potassium bicarbonate disintegrating tablet 35 mEq (has no administration in time range)    potassium bicarbonate disintegrating tablet 60 mEq (has no administration in time range)   magnesium oxide tablet 800 mg (has no administration in time range)   magnesium oxide tablet 800 mg (has no administration in time range)   potassium, sodium phosphates 280-160-250 mg packet 2 packet (has no administration in time range)   potassium, sodium phosphates 280-160-250 mg packet 2 packet (has no administration in time range)   potassium, sodium phosphates 280-160-250 mg packet 2 packet (has no administration in time range)   glucose chewable tablet 16 g (has no administration in time range)   glucose chewable tablet 24 g (has no administration in time range)   dextrose 10% bolus 125 mL (has no administration in time range)   dextrose 10% bolus 250 mL (has no administration in time range)   glucagon (human recombinant) injection 1 mg (has no administration in time range)   ondansetron injection 4 mg (has no administration in time range)   guaiFENesin 100 mg/5 ml syrup 200 mg (200 mg Oral Given 3/10/22 1151)   cetirizine tablet 10 mg (10 mg Oral Given 3/9/22 8378)   levalbuterol nebulizer solution 1.25 mg (1.25 mg Nebulization Given 3/10/22 7466)   metoprolol succinate (TOPROL-XL) 24 hr tablet 50 mg (50 mg Oral Given 3/10/22 1247)                Attending Attestation:     Physician Attestation Statement for NP/PA:   I have conducted a face to face encounter with this patient in addition to the NP/PA, due to Medical Complexity    Other NP/PA Attestation Additions:    History of Present Illness:     81F with PMHx HTN, denies any cardiac history, came for evaluation of cough x 1-2 months, worsening in the last week or so, productive of whitish phlegm. No fevers, chills, or hemoptysis. She was found to be in atrial flutter with rapid ventricular response on arrival. She denies palpitations, CP, SOB, orthopnea, or LE edema. Her daughter is here with her.  used   Physical Exam: Gen- NAD, laying flat,  speaking in full sentences  HENT- NCAT, oral mucosa moist  Eyes- normal conjunctiva, EOMI  Neck- supple, no thyromegaly  CVS- regular tachycardia, mildly hypertensive, good peripheral perfusion  Pulm- no increased work of breathing, clear lungs  Abd- soft, NTND  Ext- no LE edema  Psych- cooperative, appropriate affect   Medical Decision Making:   New onset atrial flutter with RVR.   Hemodynamically stable.  Cardizem infusion started for rate control.  Cardiology consulted- plans cardioversion in AM. Dr Dillon will start anticoagulation, likely eliquis.  Plan of care discussed with patient and her daughter. All questions answered.             ED Course as of 03/10/22 1507   Wed Mar 09, 2022   1250 Discussed case with Dr Dillon, who recommends admission to ICU with plan for cardioversion in AM [EN]   1258 Discussed case with Dr Heck. Admit to ICU [EN]      ED Course User Index  [EN] Kristin Lugo MD             Clinical Impression:   Final diagnoses:  [R07.9] Chest pain  [I48.92] Atrial flutter, unspecified type (Primary)          ED Disposition Condition    Admit               Kristin Lugo MD  03/10/22 2060

## 2022-03-11 ENCOUNTER — TELEPHONE (OUTPATIENT)
Dept: CARDIOLOGY | Facility: CLINIC | Age: 82
End: 2022-03-11
Payer: MEDICARE

## 2022-03-11 NOTE — TELEPHONE ENCOUNTER
----- Message from Loreto Roque sent at 3/11/2022  8:19 AM CST -----  Type: Patient Call Back    Who called: self     What is the request in detail:Pt need to clarify her medication instructions. Eliquis     Can the clinic reply by [facility]? No    Would the patient rather a call back or a response via [facility]? Call      Best call back number [phone], need an !      Medication clarified with patient and an        Statement Selected

## 2022-03-11 NOTE — PHYSICIAN QUERY
5PT Name: Amy Schreiber  MR #: [id number]    DOCUMENTATION CLARIFICATION     CDS/: Tish Patel RN CDIS             Contact information: [email or web address]    This form is a permanent document in the medical record.     Query Date: March 11, 2022    By submitting this query, we are merely seeking further clarification of documentation. Please utilize your independent clinical judgment when addressing the question(s) below.    The Medical Record contains the following:    Indicators Supporting Clinical Findings Location in Medical Record   x Atrial Flutter Atrial flutter with rapid ventricular response  Presented with new onset A Flutter  With cough which may have triggered     TTE normal EF  TSH normal (off levothyroxine)     Started diltiazem gtt  Started eliquis  NPO for GALEN/DCCV in AM H&P    x EKG Results Atrial flutter with variable A-V block   ST and T wave abnormality, consider inferior ischemia   ST and T wave abnormality, consider anterolateral ischemia   Abnormal ECG   When compared with ECG of 28-AUG-2016 21:51,   Significant changes have occurred   Confirmed by David Dillon MD ([id number]) on 3/9/2022 9:30:59 PM  EKG 3/9   x Medication diltiaZEM 125 mg in D5W 125 mL infusion  Rate: 0-15 mL/hr Dose: 0-15 mg/hr  Freq: Continuous Route: IV  Start: 03/09/22 1730 End: 03/10/22 1057    metoprolol succinate (TOPROL-XL) 24 hr tablet 50 mg  Dose: 50 mg  Freq: Daily Route: Oral  Start: 03/10/22 1200 End: 03/10/22 1931 MAR           MAR    x Treatment GALEN/DCCV 3/10 with conversion to NSR Discharge summary     Other       The clinical guidelines noted are only a system guideline. It does not replace the provider's clinical judgment.    Provider, please further specify the atrial flutter.    [  x ] Typical (Type I) - Diagnosis based on morphological criteria on EKG when the cavo-tricuspid isthmus region is involved   [   ] Atypical (Type II) - Diagnosis based on morphological criteria on EKG (not meeting  typical atrial flutter criteria because the cavo-tricuspid isthmus region is not involved)   [   ] Other cardiac diagnosis (please specify):_______________   [  ] Clinically Undetermined       Present on Admission (POA) status:      Please document in your progress notes daily for the duration of treatment until resolved, and include in your discharge summary.    Reference:    REYMUNDO Grimm MD, FACKUSH, RS, BULMARO Goode MD, [facility], [facility], [place], MARIA A Taylor MD, [facility], [place], [facility], & LORENZO Laurent MD. (2019, May 27). Overview of atrial flutter (HAYDEN Rawls MD, Ed.). Retrieved October 22, 2020, from [email or web address]/wpnubswb-ty-uoquby-flutter?search=atrial%20flutter&source=search_result&selectedTitle=1~150&usage_type=default&display_rank=1    Form No. 67202

## 2022-03-25 ENCOUNTER — OFFICE VISIT (OUTPATIENT)
Dept: CARDIOLOGY | Facility: CLINIC | Age: 82
End: 2022-03-25
Payer: MEDICARE

## 2022-03-25 VITALS
HEIGHT: 64 IN | HEART RATE: 98 BPM | WEIGHT: 264.56 LBS | BODY MASS INDEX: 45.17 KG/M2 | OXYGEN SATURATION: 98 % | SYSTOLIC BLOOD PRESSURE: 172 MMHG | DIASTOLIC BLOOD PRESSURE: 90 MMHG | RESPIRATION RATE: 15 BRPM

## 2022-03-25 DIAGNOSIS — I10 HYPERTENSION, BENIGN: ICD-10-CM

## 2022-03-25 DIAGNOSIS — I48.91 ATRIAL FIBRILLATION, UNSPECIFIED TYPE: ICD-10-CM

## 2022-03-25 DIAGNOSIS — E78.5 DYSLIPIDEMIA: ICD-10-CM

## 2022-03-25 DIAGNOSIS — E66.01 MORBID OBESITY WITH BMI OF 40.0-44.9, ADULT: ICD-10-CM

## 2022-03-25 DIAGNOSIS — R06.09 DOE (DYSPNEA ON EXERTION): Primary | ICD-10-CM

## 2022-03-25 DIAGNOSIS — I70.0 AORTIC ATHEROSCLEROSIS: ICD-10-CM

## 2022-03-25 DIAGNOSIS — E78.2 MIXED HYPERLIPIDEMIA: ICD-10-CM

## 2022-03-25 DIAGNOSIS — R06.02 SOB (SHORTNESS OF BREATH): ICD-10-CM

## 2022-03-25 PROCEDURE — 1111F DSCHRG MED/CURRENT MED MERGE: CPT | Mod: CPTII,S$GLB,, | Performed by: INTERNAL MEDICINE

## 2022-03-25 PROCEDURE — 3288F FALL RISK ASSESSMENT DOCD: CPT | Mod: CPTII,S$GLB,, | Performed by: INTERNAL MEDICINE

## 2022-03-25 PROCEDURE — 1160F RVW MEDS BY RX/DR IN RCRD: CPT | Mod: CPTII,S$GLB,, | Performed by: INTERNAL MEDICINE

## 2022-03-25 PROCEDURE — 1101F PR PT FALLS ASSESS DOC 0-1 FALLS W/OUT INJ PAST YR: ICD-10-PCS | Mod: CPTII,S$GLB,, | Performed by: INTERNAL MEDICINE

## 2022-03-25 PROCEDURE — 1160F PR REVIEW ALL MEDS BY PRESCRIBER/CLIN PHARMACIST DOCUMENTED: ICD-10-PCS | Mod: CPTII,S$GLB,, | Performed by: INTERNAL MEDICINE

## 2022-03-25 PROCEDURE — 99999 PR PBB SHADOW E&M-EST. PATIENT-LVL III: ICD-10-PCS | Mod: PBBFAC,,, | Performed by: INTERNAL MEDICINE

## 2022-03-25 PROCEDURE — 99999 PR PBB SHADOW E&M-EST. PATIENT-LVL III: CPT | Mod: PBBFAC,,, | Performed by: INTERNAL MEDICINE

## 2022-03-25 PROCEDURE — 99214 OFFICE O/P EST MOD 30 MIN: CPT | Mod: S$GLB,,, | Performed by: INTERNAL MEDICINE

## 2022-03-25 PROCEDURE — 1126F PR PAIN SEVERITY QUANTIFIED, NO PAIN PRESENT: ICD-10-PCS | Mod: CPTII,S$GLB,, | Performed by: INTERNAL MEDICINE

## 2022-03-25 PROCEDURE — 3077F SYST BP >= 140 MM HG: CPT | Mod: CPTII,S$GLB,, | Performed by: INTERNAL MEDICINE

## 2022-03-25 PROCEDURE — 3077F PR MOST RECENT SYSTOLIC BLOOD PRESSURE >= 140 MM HG: ICD-10-PCS | Mod: CPTII,S$GLB,, | Performed by: INTERNAL MEDICINE

## 2022-03-25 PROCEDURE — 1101F PT FALLS ASSESS-DOCD LE1/YR: CPT | Mod: CPTII,S$GLB,, | Performed by: INTERNAL MEDICINE

## 2022-03-25 PROCEDURE — 1126F AMNT PAIN NOTED NONE PRSNT: CPT | Mod: CPTII,S$GLB,, | Performed by: INTERNAL MEDICINE

## 2022-03-25 PROCEDURE — 3080F PR MOST RECENT DIASTOLIC BLOOD PRESSURE >= 90 MM HG: ICD-10-PCS | Mod: CPTII,S$GLB,, | Performed by: INTERNAL MEDICINE

## 2022-03-25 PROCEDURE — 1159F MED LIST DOCD IN RCRD: CPT | Mod: CPTII,S$GLB,, | Performed by: INTERNAL MEDICINE

## 2022-03-25 PROCEDURE — 1111F PR DISCHARGE MEDS RECONCILED W/ CURRENT OUTPATIENT MED LIST: ICD-10-PCS | Mod: CPTII,S$GLB,, | Performed by: INTERNAL MEDICINE

## 2022-03-25 PROCEDURE — 99214 PR OFFICE/OUTPT VISIT, EST, LEVL IV, 30-39 MIN: ICD-10-PCS | Mod: S$GLB,,, | Performed by: INTERNAL MEDICINE

## 2022-03-25 PROCEDURE — 3288F PR FALLS RISK ASSESSMENT DOCUMENTED: ICD-10-PCS | Mod: CPTII,S$GLB,, | Performed by: INTERNAL MEDICINE

## 2022-03-25 PROCEDURE — 1159F PR MEDICATION LIST DOCUMENTED IN MEDICAL RECORD: ICD-10-PCS | Mod: CPTII,S$GLB,, | Performed by: INTERNAL MEDICINE

## 2022-03-25 PROCEDURE — 3080F DIAST BP >= 90 MM HG: CPT | Mod: CPTII,S$GLB,, | Performed by: INTERNAL MEDICINE

## 2022-03-25 RX ORDER — METOPROLOL SUCCINATE 100 MG/1
100 TABLET, EXTENDED RELEASE ORAL DAILY
Qty: 90 TABLET | Refills: 3 | Status: SHIPPED | OUTPATIENT
Start: 2022-03-25

## 2022-03-25 RX ORDER — METOPROLOL SUCCINATE 100 MG/1
100 TABLET, EXTENDED RELEASE ORAL DAILY
Qty: 90 TABLET | Refills: 3 | Status: SHIPPED | OUTPATIENT
Start: 2022-03-25 | End: 2022-03-25

## 2022-03-25 NOTE — PROGRESS NOTES
CARDIOVASCULAR CONSULTATION    REASON FOR CONSULT:   Amy Schreiber is a 81 y.o. female who presents for follow-up     HISTORY OF PRESENT ILLNESS:     Patient is a pleasant 81-year-old lady.  Recently saw Dr. Dillon in the hospital for AFib with RVR.  Here for follow-up.  Long-term follow-up with [facility] per the patient as her insurance does not cover [facility].  Patient recently complains of dyspnea on exertion.  During the hospital had received cardioversion.  With restoration of normal sinus rhythm.  Now presenting to clinic mostly complaining of dyspnea on exertion and tiredness on exertion.  Has gone back into AFib, rate controlled      PAST MEDICAL HISTORY:     Past Medical History:   Diagnosis Date    Arthritis     Chronic back pain     High cholesterol     Hypertension     Spinal stenosis     Thyroid disease        PAST SURGICAL HISTORY:     Past Surgical History:   Procedure Laterality Date    EYE SURGERY      TREATMENT OF CARDIAC ARRHYTHMIA N/A 3/10/2022    Procedure: Cardioversion or Defibrillation;  Surgeon: David Dillon MD;  Location: [facility] CATH LAB;  Service: Cardiology;  Laterality: N/A;    TUBAL LIGATION      Uterine suspension         ALLERGIES AND MEDICATION:   Review of patient's allergies indicates:  No Known Allergies     Medication List          Accurate as of March 25, 2022  9:10 AM. If you have any questions, ask your nurse or doctor.            CONTINUE taking these medications    ELIQUIS 5 mg Tab  Generic drug: apixaban  [place] lauri tableta (5 mg en total) por vía oral 2 veces al día.  (Take 1 tablet (5 mg total) by mouth 2 (two) times daily.)     losartan-hydrochlorothiazide 100-12.5 mg 100-12.5 mg Tab  Commonly known as: HYZAAR     metoprolol succinate 50 MG 24 hr tablet  Commonly known as: TOPROL-XL  [place] lauri tableta (50 mg en total) por vía oral diariamente.  (Take 1 tablet (50 mg total) by mouth once daily.)     omeprazole 40 MG capsule  Commonly  "known as: PriLOSEC  Take 1 capsule (40 mg total) by mouth once daily.            SOCIAL HISTORY:     Social History     Socioeconomic History    Marital status:    Tobacco Use    Smoking status: Never Smoker    Smokeless tobacco: Never Used   Substance and Sexual Activity    Alcohol use: No    Drug use: No    Sexual activity: Not Currently       FAMILY HISTORY:     Family History   Problem Relation Age of Onset    Breast cancer Neg Hx     Colon cancer Neg Hx     Ovarian cancer Neg Hx        REVIEW OF SYSTEMS:   Review of Systems   Constitutional: Negative.   HENT: Negative.    Eyes: Negative.    Cardiovascular: Positive for dyspnea on exertion.   Respiratory: Positive for shortness of breath.    Endocrine: Negative.    Hematologic/Lymphatic: Negative.    Skin: Negative.    Musculoskeletal: Negative.    Gastrointestinal: Negative.    Genitourinary: Negative.    Neurological: Negative.    Psychiatric/Behavioral: Negative.    Allergic/Immunologic: Negative.        A 10 point review of systems was performed and all the pertinent positives have been mentioned. Rest of review of systems was negative.        PHYSICAL EXAM:     Vitals:    03/25/22 0856   BP: (!) 172/90   Pulse: 98   Resp: 15    Body mass index is 45.41 kg/m².  Weight: 120 kg (264 lb 8.8 oz)   Height: 5' 4" (162.6 cm)     Physical Exam  Constitutional:       Appearance: Normal appearance. She is well-developed.   HENT:      Head: Normocephalic.   Eyes:      Pupils: Pupils are equal, round, and reactive to light.   Cardiovascular:      Rate and Rhythm: Normal rate. Rhythm irregular.   Pulmonary:      Effort: Pulmonary effort is normal.      Breath sounds: Normal breath sounds.   Abdominal:      General: Bowel sounds are normal.      Palpations: Abdomen is soft.      Tenderness: There is no abdominal tenderness.   Musculoskeletal:         General: Normal range of motion.      Cervical back: Normal range of motion and neck supple.   Skin:     " General: Skin is warm.   Neurological:      Mental Status: She is alert and oriented to person, place, and time.           DATA:     Laboratory:  CBC:  Recent Labs   Lab 03/09/22  1114 03/10/22  0411   WBC 9.04 6.29   Hemoglobin 13.2 11.1 L   Hematocrit 42.9 38.9   Platelets 299 SEE COMMENT       CHEMISTRIES:  Recent Labs   Lab 03/09/22  1120 03/10/22  0411   Glucose 98 108   Sodium 138 136   Potassium 4.6 4.5   BUN 13 13   Creatinine 0.8 0.8   eGFR if  >60 >60   eGFR if non African American >60 >60   Calcium 9.2 8.8   Magnesium 2.0 2.0       CARDIAC BIOMARKERS:  Recent Labs   Lab 03/09/22  1120   Troponin I 0.007       COAGS:  Recent Labs   Lab 03/09/22  1114   INR 1.0       LIPIDS/LFTS:  Recent Labs   Lab 03/09/22  1120 03/10/22  0411   Cholesterol 189  --    Triglycerides 151 H  --    HDL 38 L  --    LDL Cholesterol 120.8  --    Non-HDL Cholesterol 151  --    AST 19 18   ALT 22 20       Hemoglobin A1C   Date Value Ref Range Status   06/14/2017 6.3 (H) 4.8 - 5.6 % Final     Comment:              Pre-diabetes: 5.7 - 6.4           Diabetes: >6.4           Glycemic control for adults with diabetes: <7.0     09/19/2016 6.3 (H) 4.8 - 5.6 % Final     Comment:              Pre-diabetes: 5.7 - 6.4           Diabetes: >6.4           Glycemic control for adults with diabetes: <7.0     08/15/2016 6.1 (H) 4.8 - 5.6 % Final     Comment:              Pre-diabetes: 5.7 - 6.4           Diabetes: >6.4           Glycemic control for adults with diabetes: <7.0         TSH  Recent Labs   Lab 03/09/22  1114   TSH 2.443       The ASCVD Risk score (Vanitaaaron ANDRADE Jr., et al., 2013) failed to calculate for the following reasons:    The 2013 ASCVD risk score is only valid for ages 40 to 79             ASSESSMENT AND PLAN     Patient Active Problem List   Diagnosis    Pelvic pain in female    Hypertension, benign    Chronic cough    Impaired fasting glucose    Hyperlipidemia    Dyslipidemia    Morbid obesity with BMI of  40.0-44.9, adult    Atrial flutter with rapid ventricular response    Aortic atherosclerosis       Patient with atrial fibrillation.  Now back in atrial fibrillation.  Continue anticoagulation with Eliquis.    Dyspnea on exertion:  Check stress test.  If no significant ischemia seen consider cardioversion again to see if that helps improve her symptoms.    Weight loss    Uncontrolled hypertension:  Increase Toprol-XL to 100 mg daily    Patient already has a follow-up with [facility].          Thank you very much for involving me in the care of your patient.  Please do not hesitate to contact me if there are any questions.      Rebecca Barroso MD, FACC, [facility]  Interventional Cardiologist, [facility].           This note was dictated with the help of speech recognition software.  There might be un-intended errors and/or substitutions.

## 2022-11-17 ENCOUNTER — HOSPITAL ENCOUNTER (EMERGENCY)
Facility: HOSPITAL | Age: 82
Discharge: HOME | End: 2022-11-17
Attending: EMERGENCY MEDICINE
Payer: MEDICARE

## 2022-11-17 VITALS
WEIGHT: 250 LBS | OXYGEN SATURATION: 97 % | SYSTOLIC BLOOD PRESSURE: 135 MMHG | DIASTOLIC BLOOD PRESSURE: 93 MMHG | HEART RATE: 93 BPM | HEIGHT: 66 IN | BODY MASS INDEX: 40.18 KG/M2 | RESPIRATION RATE: 24 BRPM | TEMPERATURE: 98 F

## 2022-11-17 DIAGNOSIS — M25.561 ACUTE PAIN OF RIGHT KNEE: Primary | ICD-10-CM

## 2022-11-17 DIAGNOSIS — M19.90 ARTHRITIS: ICD-10-CM

## 2022-11-17 DIAGNOSIS — E66.01 MORBID OBESITY: ICD-10-CM

## 2022-11-17 LAB — POCT GLUCOSE: 109 MG/DL (ref 70–110)

## 2022-11-17 PROCEDURE — 99284 EMERGENCY DEPT VISIT MOD MDM: CPT

## 2022-11-17 PROCEDURE — 82962 GLUCOSE BLOOD TEST: CPT

## 2022-11-17 PROCEDURE — 63600175 PHARM REV CODE 636 W HCPCS: Performed by: EMERGENCY MEDICINE

## 2022-11-17 PROCEDURE — 96372 THER/PROPH/DIAG INJ SC/IM: CPT | Performed by: EMERGENCY MEDICINE

## 2022-11-17 RX ORDER — HYDROCODONE BITARTRATE AND ACETAMINOPHEN 5; 325 MG/1; MG/1
1 TABLET ORAL EVERY 4 HOURS PRN
Qty: 12 TABLET | Refills: 0 | Status: SHIPPED | OUTPATIENT
Start: 2022-11-17 | End: 2022-11-27

## 2022-11-17 RX ORDER — HYDROCODONE BITARTRATE AND ACETAMINOPHEN 5; 325 MG/1; MG/1
1 TABLET ORAL EVERY 4 HOURS PRN
Qty: 12 TABLET | Refills: 0 | Status: SHIPPED | OUTPATIENT
Start: 2022-11-17 | End: 2022-11-17 | Stop reason: SDUPTHER

## 2022-11-17 RX ORDER — PREDNISONE 20 MG/1
40 TABLET ORAL DAILY
Qty: 10 TABLET | Refills: 0 | Status: SHIPPED | OUTPATIENT
Start: 2022-11-17 | End: 2022-11-17 | Stop reason: SDUPTHER

## 2022-11-17 RX ORDER — DEXAMETHASONE SODIUM PHOSPHATE 4 MG/ML
12 INJECTION, SOLUTION INTRA-ARTICULAR; INTRALESIONAL; INTRAMUSCULAR; INTRAVENOUS; SOFT TISSUE
Status: COMPLETED | OUTPATIENT
Start: 2022-11-17 | End: 2022-11-17

## 2022-11-17 RX ORDER — PREDNISONE 20 MG/1
40 TABLET ORAL DAILY
Qty: 10 TABLET | Refills: 0 | Status: SHIPPED | OUTPATIENT
Start: 2022-11-17 | End: 2022-11-22

## 2022-11-17 RX ADMIN — DEXAMETHASONE SODIUM PHOSPHATE 12 MG: 4 INJECTION, SOLUTION INTRA-ARTICULAR; INTRALESIONAL; INTRAMUSCULAR; INTRAVENOUS; SOFT TISSUE at 01:11

## 2022-11-17 NOTE — ED TRIAGE NOTES
"Reports severe pain to right knee and leg for over one month. Poor mobility. Moderate assitance to transfer to wheelchair. No relief at home with diclofen.   Also having accompanying sob & "my heart beating fast". Noted  with transfer to bed with sob but sats 97%. Increased fatigue with mobility.   "

## 2022-11-17 NOTE — ED PROVIDER NOTES
Encounter Date: 11/17/2022       History     Chief Complaint   Patient presents with    Knee Pain     The patient reports pain to her right knee x 1 month. Denies falls or trauma. Denies swelling. Denies chest pain, sob. Patient was taking diclofenac in the [place] but is out of the medication now.      HPI  This 82-year-old  female presents to the emergency room complaining of a one-month history of pain in the right knee.  There is no history of trauma.  The patient's pain began slowly but is since progressed.  There is no history of fever or trauma.  Review of patient's allergies indicates:  No Known Allergies  Past Medical History:   Diagnosis Date    Arthritis     Chronic back pain     High cholesterol     Hypertension     Spinal stenosis     Thyroid disease      Past Surgical History:   Procedure Laterality Date    EYE SURGERY      TREATMENT OF CARDIAC ARRHYTHMIA N/A 3/10/2022    Procedure: Cardioversion or Defibrillation;  Surgeon: David Dillon MD;  Location: [facility] CATH LAB;  Service: Cardiology;  Laterality: N/A;    TUBAL LIGATION      Uterine suspension       Family History   Problem Relation Age of Onset    Breast cancer Neg Hx     Colon cancer Neg Hx     Ovarian cancer Neg Hx      Social History     Tobacco Use    Smoking status: Never    Smokeless tobacco: Never   Substance Use Topics    Alcohol use: No    Drug use: No     Review of Systems  The patient was questioned specifically with regard to the following.  General: Fever, chills, sweats. Neuro: Headache. Eyes: eye problems. ENT: Ear pain, sore throat. Cardiovascular: Chest pain. Respiratory: Cough, shortness of breath. Gastrointestinal: Abdominal pain, vomiting, diarrhea. Genitourinary: Painful urination.  Musculoskeletal: Arm and leg problems. Skin: Rash.  The review of systems was negative except for the following:  Right knee pain  Physical Exam     Initial Vitals [11/17/22 1134]   BP Pulse Resp Temp SpO2   (!) 196/123  108 16 98.4 °F (36.9 °C) 99 %      MAP       --         Physical Exam   The patient was examined specifically for the following:   General:No significant distress, Good color, Warm and dry. Head and neck:Scalp atraumatic, Neck supple. Neurological:Appropriate conversation, Gross motor deficits. Eyes:Conjugate gaze, Clear corneas. ENT: No epistaxis. Cardiac: Regular rate and rhythm, Grossly normal heart tones. Pulmonary: Wheezing, Rales. Gastrointestinal: Abdominal tenderness, Abdominal distention. Musculoskeletal: Extremity deformity, Apparent pain with range of motion of the joints. Skin: Rash.   The findings on examination were normal except for the following:  Patient has tenderness in the anterior right knee.  There is no erythema warmth or ecchymosis.  Lungs are clear and free of wheezing rales rubs or rhonchi.  The patient has an elevated BMI.  The knee is stable.  ED Course   Procedures  Labs Reviewed   POCT GLUCOSE   POCT GLUCOSE MONITORING CONTINUOUS          Imaging Results    None       Medical decision making:  Given the above, this patient presents to the emergency room with a history of arthritis, 1 month of right knee pain.  Her medical record reflects the idea that she may have diabetes, but she is not on medicines for diabetes.  I will check a serum glucose and if negative I will treat with steroids.  I will try to arrange a walker for this patient.  There is no evidence of septic joint or infection.       Medications   dexAMETHasone injection 12 mg (12 mg Intramuscular Given 11/17/22 1356)                              Clinical Impression:   Final diagnoses:  [M25.561] Acute pain of right knee (Primary)  [M19.90] Arthritis  [E66.01] Morbid obesity        ED Disposition Condition    Discharge Stable          ED Prescriptions       Medication Sig Dispense Start Date End Date Auth. Provider    predniSONE (DELTASONE) 20 MG tablet  (Status: Discontinued) Take 2 tablets (40 mg total) by mouth once daily.  for 5 days 10 tablet 11/17/2022 11/17/2022 Aki Helm MD    HYDROcodone-acetaminophen (NORCO) 5-325 mg per tablet  (Status: Discontinued) Take 1 tablet by mouth every 4 (four) hours as needed for Pain. 12 tablet 11/17/2022 11/17/2022 Aki Helm MD    HYDROcodone-acetaminophen (NORCO) 5-325 mg per tablet Take 1 tablet by mouth every 4 (four) hours as needed for Pain. 12 tablet 11/17/2022 11/27/2022 Aki Helm MD    predniSONE (DELTASONE) 20 MG tablet Take 2 tablets (40 mg total) by mouth once daily. for 5 days 10 tablet 11/17/2022 11/22/2022 Aki Helm MD          Follow-up Information       Follow up With Specialties Details Why Contact Info    Kurt Mcdaniels MD Family Medicine In 1 week  [address]  [place] [address]  [phone]      [facility] DME Provider Follow up DME:  Order for walker has been submitted to [facility].  Walker will be delivered to the home. [address] [place] [address] [id number] [phone]               Aki Helm MD  11/17/22 9095

## 2022-11-17 NOTE — PROGRESS NOTES
SHARMILA met with patient and daughter, Juana March, at the bedside.  SAHRMILA advised Ms. Rust that the patient's walker would have to be delivered to the home.  SHARMILA also sent a secure chat to  advising the same.  Patient's correct address and contact number obtained.  [address], [address], [place]  [address];  Phone: [phone].    :  Jessy ID# [id number].

## 2022-11-17 NOTE — DISCHARGE INSTRUCTIONS
Please return immediately if you get worse or if new problems develop.  Please follow-up with your primary care doctor this week.  Medicines as directed.  Use a walker to walk.  Use a heating pad.  Rest

## 2022-11-17 NOTE — PROGRESS NOTES
Order for Walker has to be sent to an outside provider.  [facility]

## 2023-04-11 ENCOUNTER — HOSPITAL ENCOUNTER (EMERGENCY)
Facility: HOSPITAL | Age: 83
Discharge: HOME | End: 2023-04-11
Attending: EMERGENCY MEDICINE
Payer: MEDICARE

## 2023-04-11 VITALS
BODY MASS INDEX: 27.6 KG/M2 | OXYGEN SATURATION: 92 % | DIASTOLIC BLOOD PRESSURE: 64 MMHG | HEIGHT: 62 IN | RESPIRATION RATE: 32 BRPM | TEMPERATURE: 98 F | SYSTOLIC BLOOD PRESSURE: 117 MMHG | HEART RATE: 86 BPM | WEIGHT: 150 LBS

## 2023-04-11 DIAGNOSIS — J40 BRONCHITIS: Primary | ICD-10-CM

## 2023-04-11 DIAGNOSIS — R07.9 CHEST PAIN: ICD-10-CM

## 2023-04-11 LAB
ALBUMIN SERPL BCP-MCNC: 3.6 G/DL (ref 3.5–5.2)
ALP SERPL-CCNC: 62 U/L (ref 55–135)
ALT SERPL W/O P-5'-P-CCNC: 18 U/L (ref 10–44)
ANION GAP SERPL CALC-SCNC: 11 MMOL/L (ref 8–16)
AST SERPL-CCNC: 17 U/L (ref 10–40)
BASOPHILS # BLD AUTO: 0.04 K/UL (ref 0–0.2)
BASOPHILS NFR BLD: 0.6 % (ref 0–1.9)
BILIRUB SERPL-MCNC: 0.5 MG/DL (ref 0.1–1)
BNP SERPL-MCNC: 37 PG/ML (ref 0–99)
BUN SERPL-MCNC: 22 MG/DL (ref 8–23)
CALCIUM SERPL-MCNC: 8.9 MG/DL (ref 8.7–10.5)
CHLORIDE SERPL-SCNC: 102 MMOL/L (ref 95–110)
CO2 SERPL-SCNC: 29 MMOL/L (ref 23–29)
CREAT SERPL-MCNC: 1 MG/DL (ref 0.5–1.4)
CTP QC/QA: YES
CTP QC/QA: YES
DIFFERENTIAL METHOD: ABNORMAL
EOSINOPHIL # BLD AUTO: 0.1 K/UL (ref 0–0.5)
EOSINOPHIL NFR BLD: 1.6 % (ref 0–8)
ERYTHROCYTE [DISTWIDTH] IN BLOOD BY AUTOMATED COUNT: 13.7 % (ref 11.5–14.5)
EST. GFR  (NO RACE VARIABLE): 57 ML/MIN/1.73 M^2
GLUCOSE SERPL-MCNC: 125 MG/DL (ref 70–110)
HCT VFR BLD AUTO: 41.9 % (ref 37–48.5)
HGB BLD-MCNC: 13.2 G/DL (ref 12–16)
IMM GRANULOCYTES # BLD AUTO: 0.04 K/UL (ref 0–0.04)
IMM GRANULOCYTES NFR BLD AUTO: 0.6 % (ref 0–0.5)
LYMPHOCYTES # BLD AUTO: 2.5 K/UL (ref 1–4.8)
LYMPHOCYTES NFR BLD: 39.4 % (ref 18–48)
MCH RBC QN AUTO: 27.2 PG (ref 27–31)
MCHC RBC AUTO-ENTMCNC: 31.5 G/DL (ref 32–36)
MCV RBC AUTO: 86 FL (ref 82–98)
MONOCYTES # BLD AUTO: 0.4 K/UL (ref 0.3–1)
MONOCYTES NFR BLD: 6.2 % (ref 4–15)
NEUTROPHILS # BLD AUTO: 3.3 K/UL (ref 1.8–7.7)
NEUTROPHILS NFR BLD: 51.6 % (ref 38–73)
NRBC BLD-RTO: 0 /100 WBC
PLATELET # BLD AUTO: 298 K/UL (ref 150–450)
PMV BLD AUTO: 10 FL (ref 9.2–12.9)
POC MOLECULAR INFLUENZA A AGN: NEGATIVE
POC MOLECULAR INFLUENZA B AGN: NEGATIVE
POTASSIUM SERPL-SCNC: 3.8 MMOL/L (ref 3.5–5.1)
PROT SERPL-MCNC: 7.1 G/DL (ref 6–8.4)
RBC # BLD AUTO: 4.86 M/UL (ref 4–5.4)
SARS-COV-2 RDRP RESP QL NAA+PROBE: NEGATIVE
SODIUM SERPL-SCNC: 142 MMOL/L (ref 136–145)
TROPONIN I SERPL DL<=0.01 NG/ML-MCNC: 0.01 NG/ML (ref 0–0.03)
WBC # BLD AUTO: 6.44 K/UL (ref 3.9–12.7)

## 2023-04-11 PROCEDURE — 25000003 PHARM REV CODE 250: Performed by: EMERGENCY MEDICINE

## 2023-04-11 PROCEDURE — 93010 EKG 12-LEAD: ICD-10-PCS | Mod: ,,, | Performed by: INTERNAL MEDICINE

## 2023-04-11 PROCEDURE — 83880 ASSAY OF NATRIURETIC PEPTIDE: CPT | Performed by: EMERGENCY MEDICINE

## 2023-04-11 PROCEDURE — 87502 INFLUENZA DNA AMP PROBE: CPT

## 2023-04-11 PROCEDURE — 84484 ASSAY OF TROPONIN QUANT: CPT | Performed by: EMERGENCY MEDICINE

## 2023-04-11 PROCEDURE — 80053 COMPREHEN METABOLIC PANEL: CPT | Performed by: EMERGENCY MEDICINE

## 2023-04-11 PROCEDURE — 85025 COMPLETE CBC W/AUTO DIFF WBC: CPT | Performed by: EMERGENCY MEDICINE

## 2023-04-11 PROCEDURE — 93005 ELECTROCARDIOGRAM TRACING: CPT

## 2023-04-11 PROCEDURE — 93010 ELECTROCARDIOGRAM REPORT: CPT | Mod: ,,, | Performed by: INTERNAL MEDICINE

## 2023-04-11 PROCEDURE — 99285 EMERGENCY DEPT VISIT HI MDM: CPT | Mod: 25

## 2023-04-11 RX ORDER — ALBUTEROL SULFATE 90 UG/1
1-2 AEROSOL, METERED RESPIRATORY (INHALATION) EVERY 6 HOURS PRN
Qty: 18 G | Status: SHIPPED | OUTPATIENT
Start: 2023-04-11 | End: 2023-04-11 | Stop reason: SDUPTHER

## 2023-04-11 RX ORDER — METOPROLOL SUCCINATE 50 MG/1
100 TABLET, EXTENDED RELEASE ORAL
Status: COMPLETED | OUTPATIENT
Start: 2023-04-11 | End: 2023-04-11

## 2023-04-11 RX ORDER — LOSARTAN POTASSIUM 25 MG/1
100 TABLET ORAL DAILY
Status: DISCONTINUED | OUTPATIENT
Start: 2023-04-11 | End: 2023-04-11 | Stop reason: HOSPADM

## 2023-04-11 RX ORDER — ALBUTEROL SULFATE 90 UG/1
1-2 AEROSOL, METERED RESPIRATORY (INHALATION) EVERY 6 HOURS PRN
Qty: 18 G | Status: SHIPPED | OUTPATIENT
Start: 2023-04-11 | End: 2023-06-17 | Stop reason: SDUPTHER

## 2023-04-11 RX ORDER — AZITHROMYCIN 250 MG/1
250 TABLET, FILM COATED ORAL DAILY
Qty: 6 TABLET | Refills: 0 | Status: SHIPPED | OUTPATIENT
Start: 2023-04-11

## 2023-04-11 RX ADMIN — LOSARTAN POTASSIUM 100 MG: 25 TABLET, FILM COATED ORAL at 09:04

## 2023-04-11 RX ADMIN — METOPROLOL SUCCINATE 100 MG: 50 TABLET, EXTENDED RELEASE ORAL at 08:04

## 2023-04-11 NOTE — DISCHARGE INSTRUCTIONS
Complete the course of azithromycin prescribed.  Use albuterol as needed for shortness of breath or excessive cough.  Schedule close follow-up with your primary physician.  Return to the emergency department for any new, worsening or significantly concerning symptoms.    Thank you for coming to our Emergency Department today. It is important to remember that some problems are difficult to diagnose and may not be found during your first visit. Be sure to follow up with your primary care doctor and review any labs/imaging that was performed with them. If you do not have a primary care doctor, you may contact the one listed on your discharge paperwork or you may also call the [facility] Appointment Desk at [phone] to schedule an appointment with one.     All medications may potentially have side effects and it is impossible to predict which medications may give you side effects. If you feel that you are having a negative effect of any medication you should immediately stop taking them and seek medical attention.    Return to the ER with any questions/concerns, new/concerning symptoms, worsening or failure to improve. Do not drive or make any important decisions for 24 hours if you have received any pain medications, sedatives or mood altering drugs during your ER visit.

## 2023-04-11 NOTE — ED PROVIDER NOTES
Encounter Date: 4/11/2023    SCRIBE #1 NOTE: ICathy, am scribing for, and in the presence of,  Sridhar Amaro MD. I have scribed the following portions of the note - Other sections scribed: HPI, ROS, PE.   SCRIBE #2 NOTE: [place]ille, am scribing for, and in the presence of,  Sridhar Amaro MD.   History     Chief Complaint   Patient presents with    flu like symptoms     The patient reports a cough, nausea, vomiting, chest pain and sob with coughing, a sore throat, body pains x 1 week. Denies fever, chills, or diarrhea. Denies taking medication for symptoms     This is a 83 y/o female with a PMHx of chronic cough, HLD, HTN, and atrial flutter who presents to the Emergency Department complaining of chest discomfort with productive cough and posttussive emesis for 1 week. Her chest discomfort is brought on by coughing. Patient denies diarrhea, vomiting, abdominal pain, and dysuria. Patient has not taken any of her daily medications today.     The history is provided by the patient. A  was used (Provider).   Review of patient's allergies indicates:  No Known Allergies  Past Medical History:   Diagnosis Date    Arthritis     Chronic back pain     High cholesterol     Hypertension     Spinal stenosis     Thyroid disease      Past Surgical History:   Procedure Laterality Date    EYE SURGERY      TREATMENT OF CARDIAC ARRHYTHMIA N/A 3/10/2022    Procedure: Cardioversion or Defibrillation;  Surgeon: David Dillon MD;  Location: [facility] CATH LAB;  Service: Cardiology;  Laterality: N/A;    TUBAL LIGATION      Uterine suspension       Family History   Problem Relation Age of Onset    Breast cancer Neg Hx     Colon cancer Neg Hx     Ovarian cancer Neg Hx      Social History     Tobacco Use    Smoking status: Never    Smokeless tobacco: Never   Substance Use Topics    Alcohol use: No    Drug use: No     Review of Systems   Constitutional:  Negative for fever.   HENT:  Negative for facial  swelling and voice change.    Eyes:  Negative for pain.   Respiratory:  Positive for cough (Productive). Negative for choking and shortness of breath.    Cardiovascular:  Negative for chest pain.        (+) Chest discomfort with cough   Gastrointestinal:  Negative for abdominal pain, nausea and vomiting.        (+) Posttussive emesis     Genitourinary:  Negative for dysuria and frequency.   Musculoskeletal:  Negative for back pain.   Skin:  Negative for rash.   Neurological:  Negative for weakness and numbness.   Psychiatric/Behavioral:  Negative for self-injury.      Physical Exam     Initial Vitals [04/11/23 0714]   BP Pulse Resp Temp SpO2   136/60 89 18 98 °F (36.7 °C) 96 %      MAP       --         Physical Exam    Nursing note and vitals reviewed.  Constitutional: She is not diaphoretic. No distress.   HENT:   Head: Normocephalic and atraumatic.   Mouth/Throat: Oropharynx is clear and moist.   Eyes: Conjunctivae and EOM are normal. No scleral icterus.   Neck: Neck supple. No JVD present.   Normal range of motion.  Cardiovascular:  Normal rate, regular rhythm and intact distal pulses.           Pulmonary/Chest: Breath sounds normal. No stridor. No respiratory distress.   Abdominal: Abdomen is soft. Bowel sounds are normal. She exhibits no distension. There is no abdominal tenderness.   Musculoskeletal:         General: No tenderness or edema. Normal range of motion.      Cervical back: Normal range of motion and neck supple.     Neurological: She is alert. She has normal strength. No cranial nerve deficit or sensory deficit.   Skin: Skin is warm and dry.   Psychiatric: She has a normal mood and affect.       ED Course   Procedures  Labs Reviewed   CBC W/ AUTO DIFFERENTIAL - Abnormal; Notable for the following components:       Result Value    MCHC 31.5 (*)     Immature Granulocytes 0.6 (*)     All other components within normal limits   COMPREHENSIVE METABOLIC PANEL - Abnormal; Notable for the following  components:    Glucose 125 (*)     eGFR 57 (*)     All other components within normal limits   TROPONIN I   B-TYPE NATRIURETIC PEPTIDE   SARS-COV-2 RDRP GENE   POCT INFLUENZA A/B MOLECULAR     EKG Readings: (Independently Interpreted)   Initial Reading: No STEMI. Rhythm: Atrial Flutter. Heart Rate: 106. Ectopy: No Ectopy. Axis: Normal. Other Impression: Nonspecific ST abnormality     Imaging Results              CT Chest Without Contrast (Final result)  Result time 04/11/23 10:36:35      Final result by Vincent Elena MD (04/11/23 10:36:35)                   Impression:      No findings to correlate with asymmetric opacity in the right lung apex on recent chest radiograph.    Small cluster of pulmonary micro nodules in the inferolateral right upper lobe and superolateral right middle lobe suggestive of low-grade infectious/inflammatory process including small airways disease versus pneumonitis or sequela of small volume aspiration.    Retained secretions in the bilateral mainstem bronchi with diffuse peribronchial cuffing.    Mild cardiomegaly.      Electronically signed by: Vincent Elena MD  Date:    04/11/2023  Time:    10:36               Narrative:    EXAMINATION:  CT CHEST WITHOUT CONTRAST    CLINICAL HISTORY:  Respiratory illness, nondiagnostic xray;    TECHNIQUE:  CT images of the chest obtained without intravenous contrast.  Multiplanar reconstructions were performed.  High resolution CT images were obtained. No contrast was administered.    COMPARISON:  Chest radiograph, 01/12/2022, 03/09/2022, and 04/11/2023.  CT chest, 09/03/2017.    FINDINGS:  Base of the neck is negative for acute finding.    Thoracic aorta is stable in caliber with mild calcific atherosclerosis.  Mild coronary artery calcifications.    Heart is mildly enlarged.  No pericardial effusion.    Central airways are patent.  Possible retained secretions in the bilateral mainstem bronchi, right side greater than left.  Diffuse  peribronchial wall thickening.  Detailed evaluation of the pulmonary parenchyma is limited by motion.  No abnormality identified in the right lung apex to correlate with finding on recent radiograph, noting that this finding may have been exaggerated by overlapping prominent chondral cartilage.  There is a cluster of small nodular opacities in the inferolateral right upper lobe and superolateral right middle lobe (axial series 4, images 214 and 262).  No pleural fluid is seen.  No pneumothorax.    No aggressive osseous lesions.  Multiple remote left-sided rib fractures.  Degenerative changes in the thoracic spine.    Limited evaluation of the upper abdomen is negative for acute finding.                                       X-Ray Chest AP Portable (Final result)  Result time 04/11/23 08:16:59      Final result by Paul Young Jr., MD (04/11/23 08:16:59)                   Impression:      Asymmetric right upper lobe opacity.  Further evaluation with chest CT suggested.      Electronically signed by: Paul Michel Jr  Date:    04/11/2023  Time:    08:16               Narrative:    EXAMINATION:  XR CHEST AP PORTABLE    CLINICAL HISTORY:  Chest Pain;    TECHNIQUE:  Single frontal view of the chest was performed.    COMPARISON:  03/09/2022    FINDINGS:  Cardiomediastinal silhouetteborderline in size.  Tortuosity and atherosclerosis of the thoracic aorta.    Calcified granuloma left mid lung redemonstrated.  Asymmetric opacity in the right upper lobe may be artifactual related to portable technique, but pneumonia or mass is difficult to exclude with certainty.    Pleura, diaphragm, and thoracic cage grossly unremarkable.                                       Medications   losartan tablet 100 mg (100 mg Oral Given 4/11/23 0931)   metoprolol succinate (TOPROL-XL) 24 hr tablet 100 mg (100 mg Oral Given 4/11/23 0818)     Medical Decision Making:   History:   Old Medical Records: I decided to obtain old medical  records.  Old Records Summarized: other records.       <> Summary of Records: Per 08/26/2016: Patient presented to the ED with productive cough (white phlegm), rhinorrhea, nasal congestion, and SOB for the past 8 days. The patient was given a URI treatment.  Differential Diagnosis:   Includes but is not limited to: Viral syndrome, pneumonia, COVID, flu, URI, and ACS.  Independently Interpreted Test(s):   I have ordered and independently interpreted EKG Reading(s) - see prior notes  Clinical Tests:   Lab Tests: Ordered and Reviewed  Radiological Study: Ordered and Reviewed        Scribe Attestation:   Scribe #1: I performed the above scribed service and the documentation accurately describes the services I performed. I attest to the accuracy of the note.  Scribe #2: I performed the above scribed service and the documentation accurately describes the services I performed. I attest to the accuracy of the note.      ED Course as of 04/11/23 1157   Tue Apr 11, 2023   0936 Patient is afebrile and in no acute distress at time history and physical.  EKG is atrial flutter that is grossly rate controlled.  Patient has yet to take her prescribed metoprolol today.  Patient is on Eliquis anticoagulant making PE unlikely.  She is not hypoxic, tachypneic or in respiratory distress.  She does not appear to have impending respiratory failure.  Labs without leukocytosis or significant electrolyte abnormality.  BNP, troponin are within normal ranges.  COVID, flu are within normal ranges.  X-ray of the chest with asymmetric right upper lobe opacity concerning for pneumonia.  CT chest obtained to further evaluate. [SG]      ED Course User Index  [SG] Sridhar Amaro MD          This chart was completed using dictation software, as a result there may be some transcription errors.      Scribe attestation: I, Sridhar Amaro, personally performed the services described in this documentation.  All medical record entries made by the scribe  were at my direction and in my presence.  I have reviewed the chart and agree that the record reflects my personal performance and is accurate and complete.   Clinical Impression:   Final diagnoses:  [R07.9] Chest pain  [J40] Bronchitis (Primary)        ED Disposition Condition    Discharge Stable          ED Prescriptions       Medication Sig Dispense Start Date End Date Auth. Provider    azithromycin (Z-RGACIE) 250 MG tablet Take 1 tablet (250 mg total) by mouth once daily. Take first 2 tablets together, then 1 every day until finished. 6 tablet 4/11/2023 -- Sridhar Amaro MD    albuterol (PROVENTIL/VENTOLIN HFA) 90 mcg/actuation inhaler  (Status: Discontinued) Inhale 1-2 puffs into the lungs every 6 (six) hours as needed for Wheezing or Shortness of Breath (or excessive cough). Rescue 18 g 4/11/2023 4/11/2023 Sridhar Amaro MD    albuterol (PROVENTIL/VENTOLIN HFA) 90 mcg/actuation inhaler Inhale 1-2 puffs into the lungs every 6 (six) hours as needed for Wheezing or Shortness of Breath (or excessive cough). Rescue 18 g 4/11/2023 -- Sridhar Amaro MD          Follow-up Information       Follow up With Specialties Details Why Contact Info     [facility] [place]  Schedule an appointment as soon as possible for a visit   [address]  [place] [address]  [phone]               Sridhar Amaro MD  04/11/23 9125     Cell Phone/PDA (specify)/Clothing

## 2023-06-17 ENCOUNTER — HOSPITAL ENCOUNTER (EMERGENCY)
Facility: HOSPITAL | Age: 83
Discharge: HOME | End: 2023-06-17
Attending: EMERGENCY MEDICINE
Payer: MEDICARE

## 2023-06-17 VITALS
BODY MASS INDEX: 49.02 KG/M2 | HEART RATE: 96 BPM | TEMPERATURE: 98 F | WEIGHT: 268 LBS | OXYGEN SATURATION: 97 % | DIASTOLIC BLOOD PRESSURE: 101 MMHG | SYSTOLIC BLOOD PRESSURE: 161 MMHG | RESPIRATION RATE: 22 BRPM

## 2023-06-17 DIAGNOSIS — R10.2 PELVIC PAIN IN FEMALE: ICD-10-CM

## 2023-06-17 DIAGNOSIS — R39.9 LOWER URINARY TRACT SYMPTOMS (LUTS): Primary | ICD-10-CM

## 2023-06-17 DIAGNOSIS — R30.0 DYSURIA: ICD-10-CM

## 2023-06-17 DIAGNOSIS — R06.02 SOB (SHORTNESS OF BREATH): ICD-10-CM

## 2023-06-17 LAB
ALBUMIN SERPL BCP-MCNC: 3.7 G/DL (ref 3.5–5.2)
ALP SERPL-CCNC: 61 U/L (ref 55–135)
ALT SERPL W/O P-5'-P-CCNC: 38 U/L (ref 10–44)
ANION GAP SERPL CALC-SCNC: 7 MMOL/L (ref 8–16)
AST SERPL-CCNC: 34 U/L (ref 10–40)
BACTERIA #/AREA URNS HPF: NORMAL /HPF
BASOPHILS # BLD AUTO: 0.03 K/UL (ref 0–0.2)
BASOPHILS NFR BLD: 0.4 % (ref 0–1.9)
BILIRUB SERPL-MCNC: 0.6 MG/DL (ref 0.1–1)
BILIRUB UR QL STRIP: NEGATIVE
BNP SERPL-MCNC: 183 PG/ML (ref 0–99)
BUN SERPL-MCNC: 15 MG/DL (ref 8–23)
CALCIUM SERPL-MCNC: 9.5 MG/DL (ref 8.7–10.5)
CHLORIDE SERPL-SCNC: 102 MMOL/L (ref 95–110)
CLARITY UR: CLEAR
CO2 SERPL-SCNC: 28 MMOL/L (ref 23–29)
COLOR UR: YELLOW
CREAT SERPL-MCNC: 0.9 MG/DL (ref 0.5–1.4)
DIFFERENTIAL METHOD: ABNORMAL
EOSINOPHIL # BLD AUTO: 0.1 K/UL (ref 0–0.5)
EOSINOPHIL NFR BLD: 0.7 % (ref 0–8)
ERYTHROCYTE [DISTWIDTH] IN BLOOD BY AUTOMATED COUNT: 13.8 % (ref 11.5–14.5)
EST. GFR  (NO RACE VARIABLE): >60 ML/MIN/1.73 M^2
GLUCOSE SERPL-MCNC: 114 MG/DL (ref 70–110)
GLUCOSE UR QL STRIP: NEGATIVE
HCT VFR BLD AUTO: 40.6 % (ref 37–48.5)
HGB BLD-MCNC: 12.6 G/DL (ref 12–16)
HGB UR QL STRIP: NEGATIVE
IMM GRANULOCYTES # BLD AUTO: 0.05 K/UL (ref 0–0.04)
IMM GRANULOCYTES NFR BLD AUTO: 0.6 % (ref 0–0.5)
INR PPP: 1 (ref 0.8–1.2)
KETONES UR QL STRIP: NEGATIVE
LEUKOCYTE ESTERASE UR QL STRIP: ABNORMAL
LYMPHOCYTES # BLD AUTO: 2.1 K/UL (ref 1–4.8)
LYMPHOCYTES NFR BLD: 24.4 % (ref 18–48)
MCH RBC QN AUTO: 27.8 PG (ref 27–31)
MCHC RBC AUTO-ENTMCNC: 31 G/DL (ref 32–36)
MCV RBC AUTO: 89 FL (ref 82–98)
MICROSCOPIC COMMENT: NORMAL
MONOCYTES # BLD AUTO: 0.5 K/UL (ref 0.3–1)
MONOCYTES NFR BLD: 5.8 % (ref 4–15)
NEUTROPHILS # BLD AUTO: 5.8 K/UL (ref 1.8–7.7)
NEUTROPHILS NFR BLD: 68.1 % (ref 38–73)
NITRITE UR QL STRIP: NEGATIVE
NRBC BLD-RTO: 0 /100 WBC
PH UR STRIP: 7 [PH] (ref 5–8)
PLATELET # BLD AUTO: 269 K/UL (ref 150–450)
PMV BLD AUTO: 10.9 FL (ref 9.2–12.9)
POCT GLUCOSE: 143 MG/DL (ref 70–110)
POTASSIUM SERPL-SCNC: 4.6 MMOL/L (ref 3.5–5.1)
PROT SERPL-MCNC: 7.2 G/DL (ref 6–8.4)
PROT UR QL STRIP: ABNORMAL
PROTHROMBIN TIME: 10.8 SEC (ref 9–12.5)
RBC # BLD AUTO: 4.54 M/UL (ref 4–5.4)
RBC #/AREA URNS HPF: 0 /HPF (ref 0–4)
SODIUM SERPL-SCNC: 137 MMOL/L (ref 136–145)
SP GR UR STRIP: 1.01 (ref 1–1.03)
SQUAMOUS #/AREA URNS HPF: 3 /HPF
TROPONIN I SERPL DL<=0.01 NG/ML-MCNC: <0.006 NG/ML (ref 0–0.03)
URN SPEC COLLECT METH UR: ABNORMAL
UROBILINOGEN UR STRIP-ACNC: NEGATIVE EU/DL
WBC # BLD AUTO: 8.47 K/UL (ref 3.9–12.7)
WBC #/AREA URNS HPF: 2 /HPF (ref 0–5)

## 2023-06-17 PROCEDURE — 93010 EKG 12-LEAD: ICD-10-PCS | Mod: ,,, | Performed by: INTERNAL MEDICINE

## 2023-06-17 PROCEDURE — 83880 ASSAY OF NATRIURETIC PEPTIDE: CPT | Performed by: EMERGENCY MEDICINE

## 2023-06-17 PROCEDURE — 84484 ASSAY OF TROPONIN QUANT: CPT | Performed by: EMERGENCY MEDICINE

## 2023-06-17 PROCEDURE — 82962 GLUCOSE BLOOD TEST: CPT

## 2023-06-17 PROCEDURE — 93005 ELECTROCARDIOGRAM TRACING: CPT

## 2023-06-17 PROCEDURE — 93010 ELECTROCARDIOGRAM REPORT: CPT | Mod: ,,, | Performed by: INTERNAL MEDICINE

## 2023-06-17 PROCEDURE — 85610 PROTHROMBIN TIME: CPT | Performed by: EMERGENCY MEDICINE

## 2023-06-17 PROCEDURE — 81000 URINALYSIS NONAUTO W/SCOPE: CPT | Performed by: EMERGENCY MEDICINE

## 2023-06-17 PROCEDURE — 85025 COMPLETE CBC W/AUTO DIFF WBC: CPT | Performed by: EMERGENCY MEDICINE

## 2023-06-17 PROCEDURE — 99285 EMERGENCY DEPT VISIT HI MDM: CPT | Mod: 25

## 2023-06-17 PROCEDURE — 80053 COMPREHEN METABOLIC PANEL: CPT | Performed by: EMERGENCY MEDICINE

## 2023-06-17 RX ORDER — GABAPENTIN 300 MG/1
300 CAPSULE ORAL 3 TIMES DAILY
COMMUNITY

## 2023-06-17 RX ORDER — LOSARTAN POTASSIUM AND HYDROCHLOROTHIAZIDE 25; 100 MG/1; MG/1
1 TABLET ORAL DAILY
COMMUNITY

## 2023-06-17 RX ORDER — ALBUTEROL SULFATE 90 UG/1
1-2 AEROSOL, METERED RESPIRATORY (INHALATION) EVERY 6 HOURS PRN
Qty: 18 G | Status: CANCELLED | OUTPATIENT
Start: 2023-06-17

## 2023-06-17 RX ORDER — ALBUTEROL SULFATE 90 UG/1
1-2 AEROSOL, METERED RESPIRATORY (INHALATION) EVERY 6 HOURS PRN
Qty: 18 G | Status: SHIPPED | OUTPATIENT
Start: 2023-06-17

## 2023-06-17 NOTE — DISCHARGE INSTRUCTIONS
Emergency department testing is within acceptable ranges.  Exact cause of your symptoms is unclear at this time.  Attempt treatment with albuterol inhaler as needed.  Continue your medications as you have been prescribed.  Schedule close follow-up with your primary physician as well as your cardiologist.  You have been referred to Urology and Gynecology for your urinary symptoms.  You have also been referred to pulmonology for further evaluation of your respiratory symptoms.  You should return to the emergency department for any new, worsening or significantly concerning symptoms.  Thank you for coming to our Emergency Department today. It is important to remember that some problems are difficult to diagnose and may not be found during your first visit. Be sure to follow up with your primary care doctor and review any labs/imaging that was performed with them. If you do not have a primary care doctor, you may contact the one listed on your discharge paperwork or you may also call the [facility] Appointment Desk at [phone] to schedule an appointment with one.     All medications may potentially have side effects and it is impossible to predict which medications may give you side effects. If you feel that you are having a negative effect of any medication you should immediately stop taking them and seek medical attention.    Return to the ER with any questions/concerns, new/concerning symptoms, worsening or failure to improve. Do not drive or make any important decisions for 24 hours if you have received any pain medications, sedatives or mood altering drugs during your ER visit.

## 2023-06-17 NOTE — ED PROVIDER NOTES
"Encounter Date: 6/17/2023    SCRIBE #1 NOTE: I, Brodie Cordova, am scribing for, and in the presence of,  Sridhar Amaro MD.     History     Chief Complaint   Patient presents with    Shortness of Breath     Pt reporting SOB. Pt also reporting "my body has been going down", changes in urination, and difficulty sleeping from the SOB. Pt reporting UTI symptoms and taking AZO. Pt has a med hx of HTN.      This is a 83 y/o female with a PMHx of chronic back pain, spinal stenosis, Hyperlipidemia, hypertension who presents to the Emergency Department complaining of shortness of breath, palpitations, diaphoresis, nausea, and upper abdominal discomfort with exertion and associated fatigue and dysuria for a few months. She also complains of a chronic R lower back pain that radiates down to her R foot. Patient notes 3 days of constipation.     The history is provided by the patient.   Review of patient's allergies indicates:  No Known Allergies  Past Medical History:   Diagnosis Date    Arthritis     Chronic back pain     High cholesterol     Hypertension     Spinal stenosis     Thyroid disease      Past Surgical History:   Procedure Laterality Date    EYE SURGERY      TREATMENT OF CARDIAC ARRHYTHMIA N/A 3/10/2022    Procedure: Cardioversion or Defibrillation;  Surgeon: David Dillon MD;  Location: [facility] CATH LAB;  Service: Cardiology;  Laterality: N/A;    TUBAL LIGATION      Uterine suspension       Family History   Problem Relation Age of Onset    Breast cancer Neg Hx     Colon cancer Neg Hx     Ovarian cancer Neg Hx      Social History     Tobacco Use    Smoking status: Never    Smokeless tobacco: Never   Substance Use Topics    Alcohol use: No    Drug use: No     Review of Systems   Constitutional:  Positive for diaphoresis and fatigue.   HENT:  Negative for congestion and sore throat.    Eyes:  Negative for visual disturbance.   Respiratory:  Positive for shortness of breath. Negative for cough.    Cardiovascular:  " Positive for palpitations. Negative for leg swelling.   Gastrointestinal:  Positive for abdominal pain, constipation and nausea. Negative for vomiting.   Endocrine: Negative for polyuria.   Genitourinary:  Positive for dysuria.   Musculoskeletal:  Positive for back pain.        Positive for right leg pain.  Positive for right foot pain.   Skin:  Negative for wound.   Neurological:  Negative for dizziness.     Physical Exam     Initial Vitals [06/17/23 0840]   BP Pulse Resp Temp SpO2   (!) 193/95 97 (!) 22 97.8 °F (36.6 °C) 96 %      MAP       --         Physical Exam    Nursing note and vitals reviewed.  Constitutional: She is not diaphoretic. No distress.   HENT:   Head: Normocephalic and atraumatic.   Protecting airway   Eyes: Conjunctivae and EOM are normal. No scleral icterus.   Neck: Neck supple. No tracheal deviation present.   Normal range of motion.  Cardiovascular:  Normal rate, regular rhythm and intact distal pulses.           Pulmonary/Chest: No stridor. No respiratory distress.   Speaking in full sentences   Abdominal: Abdomen is soft. She exhibits no distension. There is no abdominal tenderness.   Musculoskeletal:         General: No edema.      Cervical back: Normal range of motion and neck supple.      Comments: There is R gluteal tenderness.  Positive straight leg raise.     Neurological: She is alert. She has normal strength. No cranial nerve deficit or sensory deficit.   Skin: Skin is warm and dry.   Psychiatric: She has a normal mood and affect.       ED Course   Procedures  Labs Reviewed   CBC W/ AUTO DIFFERENTIAL - Abnormal; Notable for the following components:       Result Value    MCHC 31.0 (*)     Immature Granulocytes 0.6 (*)     Immature Grans (Abs) 0.05 (*)     All other components within normal limits   COMPREHENSIVE METABOLIC PANEL - Abnormal; Notable for the following components:    Glucose 114 (*)     Anion Gap 7 (*)     All other components within normal limits   B-TYPE NATRIURETIC  PEPTIDE - Abnormal; Notable for the following components:     (*)     All other components within normal limits   URINALYSIS, REFLEX TO URINE CULTURE - Abnormal; Notable for the following components:    Protein, UA Trace (*)     Leukocytes, UA 1+ (*)     All other components within normal limits    Narrative:     Specimen Source->Urine   POCT GLUCOSE - Abnormal; Notable for the following components:    POCT Glucose 143 (*)     All other components within normal limits   TROPONIN I   PROTIME-INR   URINALYSIS MICROSCOPIC    Narrative:     Specimen Source->Urine     EKG Readings: (Independently Interpreted)   Initial Reading: No STEMI. Rhythm: Atrial Flutter. Ectopy: No Ectopy. Conduction: Normal.   Rate 111. There is a non-specific ST abnormality.     Imaging Results              X-Ray Chest AP Portable (Final result)  Result time 06/17/23 10:19:06      Final result by Dorothea Leon MD (06/17/23 10:19:06)                   Impression:      As above.      Electronically signed by: Dorothea Leon MD  Date:    06/17/2023  Time:    10:19               Narrative:    EXAMINATION:  XR CHEST AP PORTABLE    CLINICAL HISTORY:  SHORTNESS OF BREATH;    TECHNIQUE:  Single frontal view of the chest was performed.    COMPARISON:  04/11/2023    FINDINGS:  The heart is enlarged.  Central pulmonary vascular congestion with interstitial edema.  Small bilateral pleural effusions.  Calcified atheromatous disease affects the aorta.  Age-appropriate degenerative changes affect the skeleton.                                       Medications - No data to display  Medical Decision Making:   History:   Old Medical Records: I decided to obtain old medical records.  Old Records Summarized: other records.       <> Summary of Records: Patient presented to ED on 04/11/2023 with complaint of chest discomfort and productive cough. She had a CXR and Chest CT. Patient was diagnosed with bronchitis. She was discharged with azithro, albuterol.  "She had a follow-up with her PCP the next day.   Impression: "Small cluster of pulmonary micro nodules in the inferolateral right upper lobe and superolateral right middle lobe suggestive of low-grade infectious/inflammatory process including small airways disease versus pneumonitis or sequela of small volume aspiration.   Retained secretions in the bilateral mainstem bronchi with diffuse peribronchial cuffing.   Mild cardiomegaly. "    Clinical Tests:   Lab Tests: Reviewed and Ordered  Radiological Study: Reviewed and Ordered  Medical Tests: Reviewed and Ordered           ED Course as of 06/17/23 1259   Sat Jun 17, 2023   1009 Patient is afebrile and in no acute distress at time history and physical.  She is hypertensive at triage but blood pressure has improved somewhat at time of my assessment.  She is saturating adequately on room air.  She is not hypoxic, tachypneic or respiratory distress.  She complains mostly of generalized fatigue and intermittent shortness of breath.  EKG without definite acute ischemic changes.  Labs without leukocytosis or granulocytosis or significant electrolyte abnormality.  Troponin is within normal ranges.  BNP is mildly elevated.  Chest x-ray without pneumonia.  There is evidence of some central pulmonary venous congestion and small bilateral pleural effusions.  Patient is saturating adequately on air.  I have low clinical suspicion of ACS, PE in this patient given normal troponin, compliance with Eliquis.  Symptoms may be related to patient's atrial flutter.  She is rate controlled.  Is not hypotensive.  Patient reports visiting the emergency department for the same symptoms a few months ago.  Review of chart demonstrates that patient was diagnosed with bronchitis and prescribed albuterol inhaler.  Will attempt treatment with albuterol inhaler.  Patient additionally has significant urinary complaints.  She reports taking azo with relief but states that her problems have been " persistent for years.  Urinalysis does not suggest UTI.  Unclear etiology of patient's lower urinary tract symptoms.  Will refer patient to urology and gynecology to further evaluate. counseled extensively on supportive care, appropriate medication usage, concerning symptoms for which to return to ER and the importance of follow up. Understanding and agreement with treatment plan was expressed.  [SG]      ED Course User Index  [SG] Sridhar Amaro MD            This chart was completed using dictation software, as a result there may be some transcription errors.      I, Sridhar Amaro , personally performed the services described in this documentation. All medical record entries made by the scribe were at my direction and in my presence. I have reviewed the chart and agree that the record reflects my personal performance and is accurate and complete.    Clinical Impression:   Final diagnoses:  [R06.02] SOB (shortness of breath)  [R39.9] Lower urinary tract symptoms (LUTS) (Primary)  [R30.0] Dysuria  [R10.2] Pelvic pain in female        ED Disposition Condition    Discharge Stable          ED Prescriptions       Medication Sig Dispense Start Date End Date Auth. Provider    albuterol (PROVENTIL/VENTOLIN HFA) 90 mcg/actuation inhaler Inhale 1-2 puffs into the lungs every 6 (six) hours as needed for Wheezing or Shortness of Breath (or excessive cough). Rescue 18 g 6/17/2023 -- Sridhar Amaro MD          Follow-up Information       Follow up With Specialties Details Why Contact Info Additional Information    [facility] - Urology Urology Schedule an appointment as soon as possible for a visit   [address] [facility] [address] [facility] [address]  [facility] [id number] [phone] Please park in garage or Medical Located within [facility] Bldg surface lot and check in at Medical Office Building [address].     Your Primary Physician  Schedule an appointment as soon as possible for a visit   Make an appointment to see your primary care physician as soon as  possible for follow-up              Sridhar Amaro MD  06/17/23 9963

## 2023-12-08 ENCOUNTER — HOSPITAL ENCOUNTER (EMERGENCY)
Facility: HOSPITAL | Age: 83
Discharge: HOME | End: 2023-12-08
Attending: EMERGENCY MEDICINE
Payer: MEDICARE

## 2023-12-08 VITALS
TEMPERATURE: 98 F | WEIGHT: 260 LBS | OXYGEN SATURATION: 100 % | HEIGHT: 62 IN | HEART RATE: 82 BPM | RESPIRATION RATE: 18 BRPM | DIASTOLIC BLOOD PRESSURE: 76 MMHG | SYSTOLIC BLOOD PRESSURE: 134 MMHG | BODY MASS INDEX: 47.84 KG/M2

## 2023-12-08 DIAGNOSIS — M17.12 OSTEOARTHRITIS OF LEFT KNEE, UNSPECIFIED OSTEOARTHRITIS TYPE: Primary | ICD-10-CM

## 2023-12-08 DIAGNOSIS — M25.562 LEFT KNEE PAIN: ICD-10-CM

## 2023-12-08 PROCEDURE — 99284 EMERGENCY DEPT VISIT MOD MDM: CPT

## 2023-12-08 PROCEDURE — 96372 THER/PROPH/DIAG INJ SC/IM: CPT

## 2023-12-08 PROCEDURE — 63600175 PHARM REV CODE 636 W HCPCS

## 2023-12-08 RX ORDER — MORPHINE SULFATE 4 MG/ML
6 INJECTION, SOLUTION INTRAMUSCULAR; INTRAVENOUS
Status: COMPLETED | OUTPATIENT
Start: 2023-12-08 | End: 2023-12-08

## 2023-12-08 RX ORDER — DICLOFENAC SODIUM 10 MG/G
2 GEL TOPICAL 4 TIMES DAILY PRN
Qty: 50 G | Refills: 0 | Status: SHIPPED | OUTPATIENT
Start: 2023-12-08

## 2023-12-08 RX ORDER — MELOXICAM 15 MG/1
15 TABLET ORAL DAILY PRN
Qty: 30 TABLET | Refills: 0 | Status: SHIPPED | OUTPATIENT
Start: 2023-12-08

## 2023-12-08 RX ADMIN — MORPHINE SULFATE 6 MG: 4 INJECTION, SOLUTION INTRAMUSCULAR; INTRAVENOUS at 01:12

## 2023-12-08 NOTE — ED NOTES
Pt. With daughter, pt. Reports she has left knee pain. Pt. Reports she has been having pain for the past 2 days. Pt. With hx of arthritis and has received steroid injection to her bilat knee about 4 months ago.

## 2023-12-08 NOTE — ED PROVIDER NOTES
Encounter Date: 12/8/2023       History     Chief Complaint   Patient presents with    Knee Pain     Pt to ER with reports of left knee pain since waking up this morning. Pt denies injury or trauma. No swelling. Pt reports had injections in bilateral knees 4 months prior     83-year-old female with a past medical history of hypertension, hyperlipidemia, arthritis presents to ED for left knee pain.  Patient states this started last night.  Patient complaining of a sharp pain that comes and goes, she rates it a 10/10.  She is not taken anything to make this better.  States walking and movement makes it worse.  Patient denies any trauma or falls.  Patient states she gets bilateral knee injections by Dr. Silverman which do help.  Patient states her last injection was 4 months ago.  Patient denies fever, swelling, numbness, tingling, and color changes.      Review of patient's allergies indicates:  No Known Allergies  Past Medical History:   Diagnosis Date    Arthritis     Chronic back pain     High cholesterol     Hypertension     Spinal stenosis     Thyroid disease      Past Surgical History:   Procedure Laterality Date    EYE SURGERY      TREATMENT OF CARDIAC ARRHYTHMIA N/A 3/10/2022    Procedure: Cardioversion or Defibrillation;  Surgeon: David Dillon MD;  Location: [facility] CATH LAB;  Service: Cardiology;  Laterality: N/A;    TUBAL LIGATION      Uterine suspension       Family History   Problem Relation Age of Onset    Breast cancer Neg Hx     Colon cancer Neg Hx     Ovarian cancer Neg Hx      Social History     Tobacco Use    Smoking status: Never    Smokeless tobacco: Never   Substance Use Topics    Alcohol use: No    Drug use: No     Review of Systems   Constitutional:  Negative for fever.   Musculoskeletal:  Positive for arthralgias (left knee). Negative for joint swelling.   Skin:  Negative for color change, pallor, rash and wound.   Neurological:  Negative for weakness and numbness.        (-) paresthesia        Physical Exam     Initial Vitals [12/08/23 1225]   BP Pulse Resp Temp SpO2   127/71 79 18 97.6 °F (36.4 °C) 97 %      MAP       --         Physical Exam    Nursing note and vitals reviewed.  Constitutional: She appears well-developed and well-nourished. She is not diaphoretic. She is active. She does not appear ill. No distress.   HENT:   Head: Normocephalic and atraumatic.   Right Ear: External ear normal.   Left Ear: External ear normal.   Nose: Nose normal.   Eyes: Conjunctivae, EOM and lids are normal. Pupils are equal, round, and reactive to light. Right eye exhibits no discharge. Left eye exhibits no discharge.   Neck: Neck supple.   Normal range of motion.  Cardiovascular:            Pulses:       Dorsalis pedis pulses are 2+ on the left side.   Pulmonary/Chest: Effort normal. No respiratory distress.   Abdominal: She exhibits no distension.   Musculoskeletal:         General: Normal range of motion.      Cervical back: Normal range of motion and neck supple.      Left upper leg: Normal.      Left knee: No swelling, deformity, effusion, erythema, ecchymosis or lacerations. Normal range of motion. Tenderness present over the medial joint line.      Left lower leg: Normal.      Comments: Left knee medial joint line tenderness to palpation with painful but normal range of motion.  No erythema, warmth, or swelling appreciated.  2+ DP pulses.  Normal sensation.       Neurological: She is alert and oriented to person, place, and time. She has normal strength. No sensory deficit. GCS eye subscore is 4. GCS verbal subscore is 5. GCS motor subscore is 6.   Skin: Skin is dry. Capillary refill takes less than 2 seconds.         ED Course   Procedures  Labs Reviewed - No data to display       Imaging Results              X-Ray Knee 3 View Left (Final result)  Result time 12/08/23 13:20:16      Final result by Aj Vaughn III, MD (12/08/23 13:20:16)                   Impression:      No acute process  seen.      Electronically signed by: Aj Vaughn MD  Date:    12/08/2023  Time:    13:20               Narrative:    EXAMINATION:  XR KNEE 3 VIEW LEFT    CLINICAL HISTORY:  Pain in left knee    FINDINGS:  Knee three views left.    There is baseline DJD.  No fracture dislocation bone destruction seen.  No trauma seen.                                       Medications   morphine injection 6 mg (6 mg Intramuscular Given 12/8/23 1348)     Medical Decision Making  83-year-old female with a past medical history of hypertension, hyperlipidemia, arthritis presents to ED for left knee pain  Patient's chart and medical history reviewed.    Ddx:  Fracture  Dislocation  Contusion  Sprain  OA  Gout  Septic arthritis    Patient's vitals reviewed.  Afebrile, no respiratory distress, and nontoxic-appearing in the ED. patient had left knee medial joint line tenderness to palpation with painful but normal range of motion.  No erythema, warmth, or swelling appreciated.  2+ DP pulses.  Normal sensation.  Patient morphine for pain.  X-ray showed no acute processes per my personal interpretation.  Official x-ray showed There is baseline DJD.  No fracture dislocation bone destruction seen.  No trauma seen.  Discussed with patient this is most likely her arthritis acting up.  Patient will follow-up with the orthopedic doctor.  Instructed patient to rest, ice, and elevate.  Patient be sent home with Mobic and Voltaren gel for symptomatic control. Patient agrees with this plan. Discussed with her strict return precautions, she verbalized understanding. Patient is stable for discharge.     Amount and/or Complexity of Data Reviewed  Radiology: ordered.    Risk  Prescription drug management.                                      Clinical Impression:  Final diagnoses:  [M25.562] Left knee pain  [M17.12] Osteoarthritis of left knee, unspecified osteoarthritis type (Primary)          ED Disposition Condition    Discharge Stable          ED  Prescriptions       Medication Sig Dispense Start Date End Date Auth. Provider    meloxicam (MOBIC) 15 MG tablet Take 1 tablet (15 mg total) by mouth daily as needed for Pain. 30 tablet 12/8/2023 -- Holdsworth, Alayna, PA-C    diclofenac sodium (VOLTAREN) 1 % Gel Apply 2 g topically 4 (four) times daily as needed (Pain). 50 g 12/8/2023 -- Holdsworth, Alayna, PA-C          Follow-up Information       Follow up With Specialties Details Why Contact Info    Carlos A Silverman MD Orthopedic Surgery Call   [address] [facility] [id number] [phone]               Holdsworth, Alayna, PA-C  12/08/23 0755

## 2023-12-08 NOTE — DISCHARGE INSTRUCTIONS
Cristela por venir a nuestro Departamento de Emergencias hoy. Es importante recordar que algunos problemas o condiciones médicas son difíciles de diagnosticar y es posible que no se encuentren o aborden nava whipple visita al Departamento de Emergencias. Estas condiciones a menudo comienzan con síntomas inespecíficos y solo se pueden diagnosticar en visitas de seguimiento con whipple médico de atención primaria o especialista cuando los síntomas continúan o cambian. Recuerde que todas las condiciones médicas pueden cambiar y no podemos predecir cómo se sentirá mañana o al día siguiente. Regrese a la moises de emergencias si tiene preguntas/inquietudes, síntomas nuevos/preocupantes, empeoramiento o falta de mejora.    Asegúrese de hacer un seguimiento con whipple médico de atención primaria y revisar con ellos todos los laboratorios/imágenes/pruebas que se realizaron nava whipple visita a la moises de emergencias. Es muy común que identifiquemos hallazgos incidentales no emergentes que deben ser objeto de seguimiento con whipple médico de atención primaria. Algunos laboratorios/imágenes/pruebas pueden estar fuera del rango normal y requieren un seguimiento que no sea de emergencia y/o más investigación/tratamiento/procedimientos/pruebas para ayudar a diagnosticar/excluir/prevenir complicaciones u otras afecciones médicas potencialmente graves. Algunas anormalidades pueden no ozzy sido discutidas o abordadas nava whipple visita a la moises de emergencias. Es posible que algunos resultados de laboratorio no regresen nava whipple visita a la moises de emergencias, carmen se puede acceder a ellos descargando la aplicación gratuita [facility] o visitando [email or web address] Es importante que revise con wihpple médico todas las pruebas de laboratorio/imágenes/pruebas que estén fuera del rango normal.    Lauri visita a la moises de emergencias no reemplaza lauri visita de atención primaria, y muchas pruebas de detección o de seguimiento no pueden ser  ordenadas por un médico de emergencia ni realizadas por la moises de emergencias. Algunas pruebas pueden incluso requerir aprobación previa.    Si no tiene un médico de atención primaria, puede comunicarse con el que figura en la documentación del agata o también puede llamar al mostrador de citas de la [facility] al [phone] o a [address] [place] [address] al [phone] para programar lauri rani. rani, o establecer atención con un médico de atención primaria o incluso un especialista y para obtener información sobre los recursos locales. Es importante para whipple yadi que tenga un médico de atención primaria.    [place] todos los medicamentos según las indicaciones. Hemos hecho todo lo posible para seleccionar un medicamento para usted que tratará whipple condición; sin embargo, todos los medicamentos pueden tener efectos secundarios y es imposible predecir qué medicamentos pueden causarle efectos secundarios o cuáles (si los hay) esos medicamentos le pueden jeanna. Si siente que está teniendo un efecto negativo o un efecto secundario de algún medicamento, debe dejar de kathy esos medicamentos de inmediato y buscar atención médica. Si siente que está teniendo lauri reacción potencialmente mortal, llame al 911.    No conduzca, nade, suba a [place], se bañe, opere maquinaria pesada, patrick alcohol o tome medicamentos potencialmente sedantes, firme ningún documento legal o tome decisiones importantes nava 24 horas si ha recibido algún medicamento para el dolor, sedantes o alteradores del estado de ánimo. medicamentos nava whipple visita a la moises de emergencias o dentro de las 24 horas de haberlos tomado si se los ruiz recetado.    Puede encontrar recursos adicionales para dentistas, audífonos, equipos médicos duraderos, farmacias de bajo costo y otros recursos en [email or web address]

## 2024-01-11 ENCOUNTER — HOSPITAL ENCOUNTER (EMERGENCY)
Facility: HOSPITAL | Age: 84
Discharge: HOME | End: 2024-01-11
Attending: EMERGENCY MEDICINE
Payer: MEDICARE

## 2024-01-11 VITALS
BODY MASS INDEX: 47.55 KG/M2 | OXYGEN SATURATION: 97 % | DIASTOLIC BLOOD PRESSURE: 70 MMHG | SYSTOLIC BLOOD PRESSURE: 130 MMHG | RESPIRATION RATE: 20 BRPM | TEMPERATURE: 98 F | HEART RATE: 80 BPM | WEIGHT: 260 LBS

## 2024-01-11 DIAGNOSIS — R42 DIZZINESS: Primary | ICD-10-CM

## 2024-01-11 LAB
ALBUMIN SERPL BCP-MCNC: 3.4 G/DL (ref 3.5–5.2)
ALP SERPL-CCNC: 65 U/L (ref 55–135)
ALT SERPL W/O P-5'-P-CCNC: 18 U/L (ref 10–44)
ANION GAP SERPL CALC-SCNC: 11 MMOL/L (ref 8–16)
AST SERPL-CCNC: 24 U/L (ref 10–40)
BASOPHILS # BLD AUTO: 0.04 K/UL (ref 0–0.2)
BASOPHILS NFR BLD: 0.6 % (ref 0–1.9)
BILIRUB SERPL-MCNC: 0.3 MG/DL (ref 0.1–1)
BNP SERPL-MCNC: 21 PG/ML (ref 0–99)
BUN SERPL-MCNC: 17 MG/DL (ref 8–23)
CALCIUM SERPL-MCNC: 8.9 MG/DL (ref 8.7–10.5)
CHLORIDE SERPL-SCNC: 100 MMOL/L (ref 95–110)
CO2 SERPL-SCNC: 28 MMOL/L (ref 23–29)
CREAT SERPL-MCNC: 1 MG/DL (ref 0.5–1.4)
CTP QC/QA: YES
CTP QC/QA: YES
DIFFERENTIAL METHOD BLD: ABNORMAL
EOSINOPHIL # BLD AUTO: 0.1 K/UL (ref 0–0.5)
EOSINOPHIL NFR BLD: 1.7 % (ref 0–8)
ERYTHROCYTE [DISTWIDTH] IN BLOOD BY AUTOMATED COUNT: 13.3 % (ref 11.5–14.5)
EST. GFR  (NO RACE VARIABLE): 56 ML/MIN/1.73 M^2
GLUCOSE SERPL-MCNC: 127 MG/DL (ref 70–110)
HCT VFR BLD AUTO: 42.5 % (ref 37–48.5)
HGB BLD-MCNC: 13.4 G/DL (ref 12–16)
IMM GRANULOCYTES # BLD AUTO: 0.05 K/UL (ref 0–0.04)
IMM GRANULOCYTES NFR BLD AUTO: 0.7 % (ref 0–0.5)
LYMPHOCYTES # BLD AUTO: 2.9 K/UL (ref 1–4.8)
LYMPHOCYTES NFR BLD: 39.2 % (ref 18–48)
MCH RBC QN AUTO: 27.7 PG (ref 27–31)
MCHC RBC AUTO-ENTMCNC: 31.5 G/DL (ref 32–36)
MCV RBC AUTO: 88 FL (ref 82–98)
MONOCYTES # BLD AUTO: 0.5 K/UL (ref 0.3–1)
MONOCYTES NFR BLD: 6.7 % (ref 4–15)
NEUTROPHILS # BLD AUTO: 3.7 K/UL (ref 1.8–7.7)
NEUTROPHILS NFR BLD: 51.1 % (ref 38–73)
NRBC BLD-RTO: 0 /100 WBC
PLATELET # BLD AUTO: 351 K/UL (ref 150–450)
PMV BLD AUTO: 10.9 FL (ref 9.2–12.9)
POC MOLECULAR INFLUENZA A AGN: NEGATIVE
POC MOLECULAR INFLUENZA B AGN: NEGATIVE
POTASSIUM SERPL-SCNC: 4 MMOL/L (ref 3.5–5.1)
PROT SERPL-MCNC: 7.3 G/DL (ref 6–8.4)
RBC # BLD AUTO: 4.83 M/UL (ref 4–5.4)
SARS-COV-2 RDRP RESP QL NAA+PROBE: NEGATIVE
SODIUM SERPL-SCNC: 139 MMOL/L (ref 136–145)
TROPONIN I SERPL DL<=0.01 NG/ML-MCNC: 0.01 NG/ML (ref 0–0.03)
WBC # BLD AUTO: 7.27 K/UL (ref 3.9–12.7)

## 2024-01-11 PROCEDURE — 83880 ASSAY OF NATRIURETIC PEPTIDE: CPT

## 2024-01-11 PROCEDURE — 84484 ASSAY OF TROPONIN QUANT: CPT | Performed by: EMERGENCY MEDICINE

## 2024-01-11 PROCEDURE — 80053 COMPREHEN METABOLIC PANEL: CPT | Performed by: EMERGENCY MEDICINE

## 2024-01-11 PROCEDURE — 96361 HYDRATE IV INFUSION ADD-ON: CPT

## 2024-01-11 PROCEDURE — 87635 SARS-COV-2 COVID-19 AMP PRB: CPT

## 2024-01-11 PROCEDURE — 93005 ELECTROCARDIOGRAM TRACING: CPT

## 2024-01-11 PROCEDURE — 87502 INFLUENZA DNA AMP PROBE: CPT

## 2024-01-11 PROCEDURE — 93010 ELECTROCARDIOGRAM REPORT: CPT | Mod: ,,, | Performed by: INTERNAL MEDICINE

## 2024-01-11 PROCEDURE — 85025 COMPLETE CBC W/AUTO DIFF WBC: CPT

## 2024-01-11 PROCEDURE — 99285 EMERGENCY DEPT VISIT HI MDM: CPT | Mod: 25

## 2024-01-11 PROCEDURE — 96360 HYDRATION IV INFUSION INIT: CPT

## 2024-01-11 PROCEDURE — 25000003 PHARM REV CODE 250: Performed by: EMERGENCY MEDICINE

## 2024-01-11 RX ORDER — BENZONATATE 100 MG/1
100 CAPSULE ORAL 3 TIMES DAILY PRN
Qty: 30 CAPSULE | Refills: 0 | Status: SHIPPED | OUTPATIENT
Start: 2024-01-11 | End: 2024-01-11

## 2024-01-11 RX ADMIN — SODIUM CHLORIDE 500 ML: 9 INJECTION, SOLUTION INTRAVENOUS at 04:01

## 2024-01-11 NOTE — ED PROVIDER NOTES
Encounter Date: 1/11/2024    SCRIBE #1 NOTE: I, Lilli Rubio, am scribing for, and in the presence of,  Aki Parikh MD. I have scribed the following portions of the note - Other sections scribed: HPI, ROS,PE.       History     Chief Complaint   Patient presents with    Weakness     Pt reporting generalized weakness, nausea and dizziness since last night. Pt also reports having a heart monitor placed 6 days ago and having pain to the placement site. No redness or swelling observed.     Dizziness     This is a 83 year old female, with a PMHx of Arthritis, Chronic back pain, HTN, Spinal stenosis, and Thyroid disease, who presents to the ED complaining of Dizziness, symptoms onset last night. Patient reports nausea, mild non-productive cough, and chronic urinary frequency.  Patient states having a heart monitor placed 6 days ago and having pain to the placement site; supposed to wear it for 15 days. Patient denies chest pain, diarrhea, dysuria, emesis, or other associated symptoms. No other alleviating or exacerbating factors. This is the extent of the patient's complaints in the ED. NKDA.                                The history is provided by the patient. The history is limited by a language barrier. A  was used (provider translated).     Review of patient's allergies indicates:  No Known Allergies  Past Medical History:   Diagnosis Date    Arthritis     Chronic back pain     High cholesterol     Hypertension     Spinal stenosis     Thyroid disease      Past Surgical History:   Procedure Laterality Date    EYE SURGERY      TREATMENT OF CARDIAC ARRHYTHMIA N/A 3/10/2022    Procedure: Cardioversion or Defibrillation;  Surgeon: David Dillon MD;  Location: [facility] CATH LAB;  Service: Cardiology;  Laterality: N/A;    TUBAL LIGATION      Uterine suspension       Family History   Problem Relation Age of Onset    Breast cancer Neg Hx     Colon cancer Neg Hx     Ovarian cancer Neg Hx      Social  History     Tobacco Use    Smoking status: Never    Smokeless tobacco: Never   Substance Use Topics    Alcohol use: No    Drug use: No     Review of Systems   Respiratory:  Positive for cough (non-productive).    Cardiovascular:  Negative for chest pain.   Gastrointestinal:  Positive for nausea. Negative for diarrhea and vomiting.   Genitourinary:  Positive for frequency (chronic). Negative for dysuria.   Neurological:  Positive for dizziness.       Physical Exam     Initial Vitals   BP Pulse Resp Temp SpO2   01/11/24 1456 01/11/24 1456 01/11/24 1456 01/11/24 1501 01/11/24 1456   139/76 80 20 98 °F (36.7 °C) 98 %      MAP       --                Physical Exam    Nursing note and vitals reviewed.  Constitutional: She appears well-developed and well-nourished.   Sitting upright conversational.   HENT:   Head: Normocephalic.   Right Ear: Tympanic membrane, external ear and ear canal normal.   Left Ear: Tympanic membrane, external ear and ear canal normal.   Eyes: Conjunctivae and EOM are normal. Pupils are equal, round, and reactive to light.   No nystagmus,    Neck:   Normal range of motion.  Cardiovascular:  Normal rate.           Monitor on chest   Pulmonary/Chest: Breath sounds normal. No respiratory distress. She has no wheezes.   Regular clear lungs.    Abdominal: Abdomen is soft. There is no abdominal tenderness.   No abdominal tenderness.    Musculoskeletal:         General: No edema. Normal range of motion.      Cervical back: Normal range of motion.      Comments: No lower extremity edema     Neurological: She is alert. She has normal strength. No cranial nerve deficit or sensory deficit. GCS eye subscore is 4. GCS verbal subscore is 5. GCS motor subscore is 6.   Finger to nose intact   Skin: Skin is warm.   Psychiatric: She has a normal mood and affect.         ED Course   Procedures  Labs Reviewed   CBC W/ AUTO DIFFERENTIAL - Abnormal; Notable for the following components:       Result Value    MCHC 31.5  (*)     Immature Granulocytes 0.7 (*)     Immature Grans (Abs) 0.05 (*)     All other components within normal limits   COMPREHENSIVE METABOLIC PANEL - Abnormal; Notable for the following components:    Glucose 127 (*)     Albumin 3.4 (*)     eGFR 56 (*)     All other components within normal limits   B-TYPE NATRIURETIC PEPTIDE   TROPONIN I   SARS-COV-2 RDRP GENE   POCT INFLUENZA A/B MOLECULAR          Imaging Results              X-Ray Chest 1 View (Final result)  Result time 01/11/24 16:44:16   Procedure changed from X-Ray Chest PA And Lateral     Final result by Leslie Hoover MD (01/11/24 16:44:16)                   Impression:      No definite acute intrathoracic process seen.      Electronically signed by: Leslie Hoover MD  Date:    01/11/2024  Time:    16:44               Narrative:    EXAMINATION:  XR CHEST 1 VIEW    CLINICAL HISTORY:  dizziness; Dizziness and giddiness    TECHNIQUE:  Single frontal view of the chest was performed.    COMPARISON:  06/17/2023    FINDINGS:  The cardiac silhouette is normal in size.  The pulmonary vascularity is normal.  There is a possible cardiac monitoring patch device obscuring the left midthoracic region.  No focal airspace disease.  No pleural effusion.  No pneumothorax.    The osseous structures appear normal.                                       Medications   sodium chloride 0.9% bolus 500 mL 500 mL (0 mLs Intravenous Stopped 1/11/24 1832)     Medical Decision Making    81-year-old female presenting with dizziness throughout the day.  Upon my evaluation of the patient's symptoms had resolved.  No nystagmus present.  No ataxia present.  No gait instability.  The patient denied any palpitations or chest pain.  The patient does appear to be in AFib but this is not new.  The patient is anticoagulated and rate controlled.  Patient denies any loose stools or URI symptoms.  Given the patient's symptoms have resolved recommendations for the patient to follow up  with the primary care provider.  No neurological deficit noted.    Differential diagnosis includes URI, electrolyte irregularities, arrhythmia    Amount and/or Complexity of Data Reviewed  Labs: ordered. Decision-making details documented in ED Course.    Risk  Prescription drug management.            Scribe Attestation:   Scribe #1: I performed the above scribed service and the documentation accurately describes the services I performed. I attest to the accuracy of the note.        ED Course as of 01/11/24 1837   u Jan 11, 2024   1632 SARS-CoV-2 RNA, Amplification, Qual: Negative [JM]   1632 POC Molecular Influenza A Ag: Negative [JM]      ED Course User Index  [JM] Aki Parikh MD                           Clinical Impression:  Final diagnoses:  [R42] Dizziness (Primary)          ED Disposition Condition    Discharge Stable          I, Aki Parikh, personally performed the services described in this documentation. All medical record entries made by the scribe were at my direction and in my presence.  I have reviewed the chart and agree that the record reflects my personal performance and is accurate and complete.       Aki Parikh MD  01/11/24 1837

## 2024-01-11 NOTE — ED TRIAGE NOTES
Pt to the ED with complaints of dizziness/lightheadedness, generalized weakness and nausea since last night. Pt denies chest pain or shortness of breath Pt reports having a holter monitor placed 6 days ago and is supposed to wear it for 15 days.

## 2024-01-12 NOTE — DISCHARGE INSTRUCTIONS
Recommend increasing your fluid intake to prevent dizziness.    Please follow up with your primary care provider within the next 1-2 days for re-evaluation if symptoms.  If your symptoms reoccur or worsen seek medical care immediately.    1. Recomiende aumentar la ingesta de líquidos para prevenir mareos.  2. Bessie un seguimiento con whipple proveedor de atención primaria dentro de los próximos 1 o 2 días para lauri reevaluación si presenta síntomas.  3. Si merline síntomas reaparecen o empeoran busque atención médica de inmediato.

## 2024-05-27 ENCOUNTER — HOSPITAL ENCOUNTER (EMERGENCY)
Facility: HOSPITAL | Age: 84
Discharge: HOME | End: 2024-05-27
Attending: EMERGENCY MEDICINE
Payer: MEDICARE

## 2024-05-27 VITALS
DIASTOLIC BLOOD PRESSURE: 54 MMHG | OXYGEN SATURATION: 100 % | HEART RATE: 88 BPM | WEIGHT: 267 LBS | TEMPERATURE: 99 F | RESPIRATION RATE: 22 BRPM | BODY MASS INDEX: 48.83 KG/M2 | SYSTOLIC BLOOD PRESSURE: 101 MMHG

## 2024-05-27 DIAGNOSIS — M48.00 SPINAL STENOSIS, UNSPECIFIED SPINAL REGION: ICD-10-CM

## 2024-05-27 DIAGNOSIS — M25.519 SHOULDER PAIN: ICD-10-CM

## 2024-05-27 DIAGNOSIS — M25.559 HIP PAIN: ICD-10-CM

## 2024-05-27 DIAGNOSIS — M79.672 LEFT FOOT PAIN: Primary | ICD-10-CM

## 2024-05-27 LAB
ALBUMIN SERPL BCP-MCNC: 3.5 G/DL (ref 3.5–5.2)
ALP SERPL-CCNC: 63 U/L (ref 55–135)
ALT SERPL W/O P-5'-P-CCNC: 12 U/L (ref 10–44)
ANION GAP SERPL CALC-SCNC: 7 MMOL/L (ref 8–16)
AST SERPL-CCNC: 15 U/L (ref 10–40)
BASOPHILS # BLD AUTO: 0.05 K/UL (ref 0–0.2)
BASOPHILS NFR BLD: 0.6 % (ref 0–1.9)
BILIRUB SERPL-MCNC: 0.5 MG/DL (ref 0.1–1)
BILIRUB UR QL STRIP: NEGATIVE
BUN SERPL-MCNC: 16 MG/DL (ref 8–23)
CALCIUM SERPL-MCNC: 9.8 MG/DL (ref 8.7–10.5)
CHLORIDE SERPL-SCNC: 104 MMOL/L (ref 95–110)
CLARITY UR: CLEAR
CO2 SERPL-SCNC: 26 MMOL/L (ref 23–29)
COLOR UR: YELLOW
CREAT SERPL-MCNC: 0.8 MG/DL (ref 0.5–1.4)
DIFFERENTIAL METHOD BLD: ABNORMAL
EOSINOPHIL # BLD AUTO: 0.1 K/UL (ref 0–0.5)
EOSINOPHIL NFR BLD: 1.4 % (ref 0–8)
ERYTHROCYTE [DISTWIDTH] IN BLOOD BY AUTOMATED COUNT: 13.5 % (ref 11.5–14.5)
EST. GFR  (NO RACE VARIABLE): >60 ML/MIN/1.73 M^2
GLUCOSE SERPL-MCNC: 89 MG/DL (ref 70–110)
GLUCOSE UR QL STRIP: NEGATIVE
HCT VFR BLD AUTO: 40.2 % (ref 37–48.5)
HGB BLD-MCNC: 12.3 G/DL (ref 12–16)
HGB UR QL STRIP: NEGATIVE
HYALINE CASTS #/AREA URNS LPF: 1 /LPF
IMM GRANULOCYTES # BLD AUTO: 0.05 K/UL (ref 0–0.04)
IMM GRANULOCYTES NFR BLD AUTO: 0.6 % (ref 0–0.5)
KETONES UR QL STRIP: NEGATIVE
LEUKOCYTE ESTERASE UR QL STRIP: ABNORMAL
LYMPHOCYTES # BLD AUTO: 2.7 K/UL (ref 1–4.8)
LYMPHOCYTES NFR BLD: 31 % (ref 18–48)
MCH RBC QN AUTO: 27.4 PG (ref 27–31)
MCHC RBC AUTO-ENTMCNC: 30.6 G/DL (ref 32–36)
MCV RBC AUTO: 90 FL (ref 82–98)
MICROSCOPIC COMMENT: NORMAL
MONOCYTES # BLD AUTO: 0.5 K/UL (ref 0.3–1)
MONOCYTES NFR BLD: 6.3 % (ref 4–15)
NEUTROPHILS # BLD AUTO: 5.2 K/UL (ref 1.8–7.7)
NEUTROPHILS NFR BLD: 60.1 % (ref 38–73)
NITRITE UR QL STRIP: NEGATIVE
NRBC BLD-RTO: 0 /100 WBC
OHS QRS DURATION: 80 MS
OHS QTC CALCULATION: 462 MS
PH UR STRIP: 6 [PH] (ref 5–8)
PLATELET # BLD AUTO: 273 K/UL (ref 150–450)
PMV BLD AUTO: 10.3 FL (ref 9.2–12.9)
POTASSIUM SERPL-SCNC: 4 MMOL/L (ref 3.5–5.1)
PROT SERPL-MCNC: 6.8 G/DL (ref 6–8.4)
PROT UR QL STRIP: NEGATIVE
RBC # BLD AUTO: 4.49 M/UL (ref 4–5.4)
SODIUM SERPL-SCNC: 137 MMOL/L (ref 136–145)
SP GR UR STRIP: 1.01 (ref 1–1.03)
SQUAMOUS #/AREA URNS HPF: 2 /HPF
URN SPEC COLLECT METH UR: ABNORMAL
UROBILINOGEN UR STRIP-ACNC: NEGATIVE EU/DL
WBC # BLD AUTO: 8.61 K/UL (ref 3.9–12.7)
WBC #/AREA URNS HPF: 5 /HPF (ref 0–5)

## 2024-05-27 PROCEDURE — 63600175 PHARM REV CODE 636 W HCPCS: Performed by: EMERGENCY MEDICINE

## 2024-05-27 PROCEDURE — 93005 ELECTROCARDIOGRAM TRACING: CPT

## 2024-05-27 PROCEDURE — 96374 THER/PROPH/DIAG INJ IV PUSH: CPT

## 2024-05-27 PROCEDURE — 25000003 PHARM REV CODE 250: Performed by: EMERGENCY MEDICINE

## 2024-05-27 PROCEDURE — 80053 COMPREHEN METABOLIC PANEL: CPT | Performed by: EMERGENCY MEDICINE

## 2024-05-27 PROCEDURE — 81000 URINALYSIS NONAUTO W/SCOPE: CPT | Performed by: EMERGENCY MEDICINE

## 2024-05-27 PROCEDURE — 93010 ELECTROCARDIOGRAM REPORT: CPT | Mod: ,,, | Performed by: INTERNAL MEDICINE

## 2024-05-27 PROCEDURE — 99285 EMERGENCY DEPT VISIT HI MDM: CPT | Mod: 25

## 2024-05-27 PROCEDURE — 85025 COMPLETE CBC W/AUTO DIFF WBC: CPT | Performed by: EMERGENCY MEDICINE

## 2024-05-27 RX ORDER — MELOXICAM 15 MG/1
15 TABLET ORAL DAILY PRN
Qty: 30 TABLET | Refills: 0 | Status: SHIPPED | OUTPATIENT
Start: 2024-05-27

## 2024-05-27 RX ORDER — LIDOCAINE 50 MG/G
1 PATCH TOPICAL
Status: DISCONTINUED | OUTPATIENT
Start: 2024-05-27 | End: 2024-05-27 | Stop reason: HOSPADM

## 2024-05-27 RX ORDER — LIDOCAINE 50 MG/G
1 PATCH TOPICAL DAILY PRN
Qty: 10 PATCH | Refills: 0 | Status: SHIPPED | OUTPATIENT
Start: 2024-05-27 | End: 2024-06-06

## 2024-05-27 RX ORDER — KETOROLAC TROMETHAMINE 30 MG/ML
10 INJECTION, SOLUTION INTRAMUSCULAR; INTRAVENOUS
Status: COMPLETED | OUTPATIENT
Start: 2024-05-27 | End: 2024-05-27

## 2024-05-27 RX ADMIN — LIDOCAINE 5% 1 PATCH: 700 PATCH TOPICAL at 02:05

## 2024-05-27 RX ADMIN — KETOROLAC TROMETHAMINE 10 MG: 30 INJECTION, SOLUTION INTRAMUSCULAR at 01:05

## 2024-05-27 NOTE — DISCHARGE INSTRUCTIONS
Please return immediately for any chest pain, shortness of breath, numbness, weakness, facial droop, difficulty speaking, worsening pain or any other concerns. Please follow up closely with primary care doctor, orthopedics and spine doctor to discuss recent ED visit.     Please return to the ED immediately for any numbness, weakness, numbness in your groin, fever, stool or urinating on yourself or inability to stool or urinate. Follow up with your primary care doctor in 2-3 days to discuss recent ED visit and to discuss physical therapy or MRI.     Thank you for coming to our Emergency Department today. As we discussed, it is important to remember that some problems are difficult to diagnose and may not be found during your Emergency Department visit. Be sure to follow up with your primary care doctor and review all labs/imaging/tests that were performed during this visit with them. Some labs/tests may be outside of the normal range and require non-emergent follow-up and further investigation to help diagnose/exclude/prevent complications or other medical conditions.    If you do not have a primary care doctor, you may contact the one listed on your discharge paperwork or you may also call the [facility] Appointment Desk at [phone] to schedule an appointment and establish care with one. It is important to your health that you have a primary care doctor.    Please take all medications as directed. All medications may potentially have side-effects and it is impossible to predict which medications may give you side-effects or what side-effects (if any) they will give you.. If you feel that you are having a negative effect or side-effect of any medication you should immediately stop taking them and seek medical attention. If you feel that you are having a life-threatening reaction call 911.    Return to the ER with any questions/concerns, new/concerning symptoms, worsening or failure to improve.     Do not  drive, swim, climb to height, take a bath or make any important decisions for 24 hours if you have received any pain medications, sedatives or mood altering drugs during your ER visit.

## 2024-05-27 NOTE — ED PROVIDER NOTES
"Encounter Date: 5/27/2024    SCRIBE #1 NOTE: I, Vannessa Lance, am scribing for, and in the presence of,  Salina Nowak MD. I have scribed the following portions of the note - Other sections scribed: HPI, ROS, PE.       History     Chief Complaint   Patient presents with    Chest Pain     One week left chest pain from left shoulder down to bottom of ribs, makes it difficult to breath deep. Pain worse lying on left shoulder or certain movements      Amy Schreiber is a 83 y.o. female, with a PMHx of HTN, chronic back pain, HLD, Spinal stenosis, arthritis, Aortic atherosclerosis, atrial flutter, and Dyslipidemia, who presents to the ED with left-sided body pain in her shoulder, back, hip, kidney, leg, and foot arch for the past 3 weeks. Patient reports pain when standing and with movement. She does not remember what could have caused the pain, but denies any injury or fall. The patient also reports having dysuria, hematuria, and/or urinary frequency about 3 weeks ago, but none currently. Reports she took Azo or "maybe an antibiotics, I can't remember.". She reports that she has them every 3-4 months, but is normally given pills (unspecified) to treat it. No other exacerbating or alleviating factors. The patient reports taking Ibuprofen in attempt to alleviate symptoms. Denies consulting a doctor prior to today. Denies any pain to her right side, chest pain, SOB, neck pain, cough, fever, numbness, weakness, abdominal pain, or other associated symptoms.     Other relevant history includes that the patient had a cardiologist appointment on 5/10/24 according to her daughter. Daily medications of metoprolol succinate, ELIQUIS, and losartan-hydrochlorothiazide. Patient denies EtOH, tobacco, or illicit drug use. Patient reports a PSHx of treatment of cardiac arrhythmia, eye surgery, tube ligation, and Uterine suspension. Denies drug allergies.    The history is provided by the patient and a relative. The history is " limited by a language barrier. A  was used (#[id number]).     Review of patient's allergies indicates:  No Known Allergies  Past Medical History:   Diagnosis Date    Arthritis     Chronic back pain     High cholesterol     Hypertension     Spinal stenosis     Thyroid disease      Past Surgical History:   Procedure Laterality Date    EYE SURGERY      TREATMENT OF CARDIAC ARRHYTHMIA N/A 3/10/2022    Procedure: Cardioversion or Defibrillation;  Surgeon: David Dillon MD;  Location: [facility] CATH LAB;  Service: Cardiology;  Laterality: N/A;    TUBAL LIGATION      Uterine suspension       Family History   Problem Relation Name Age of Onset    Breast cancer Neg Hx      Colon cancer Neg Hx      Ovarian cancer Neg Hx       Social History     Tobacco Use    Smoking status: Never    Smokeless tobacco: Never   Substance Use Topics    Alcohol use: No    Drug use: No     Review of Systems   Constitutional:  Negative for chills, diaphoresis and fever.   Eyes:  Negative for photophobia and visual disturbance.   Respiratory:  Negative for cough and shortness of breath.    Cardiovascular:  Negative for chest pain and leg swelling.   Gastrointestinal:  Negative for abdominal pain, blood in stool, constipation, diarrhea, nausea and vomiting.   Genitourinary:  Positive for dysuria (resloved), frequency (resolved) and hematuria (resvolved). Negative for flank pain and urgency.   Musculoskeletal:  Positive for arthralgias, back pain and myalgias. Negative for neck pain and neck stiffness.   Skin:  Negative for rash and wound.   Neurological:  Negative for weakness, light-headedness, numbness and headaches.   Psychiatric/Behavioral:  Negative for confusion and suicidal ideas.    All other systems reviewed and are negative.      Physical Exam     Initial Vitals   BP Pulse Resp Temp SpO2   05/27/24 1121 05/27/24 1121 05/27/24 1121 05/27/24 1121 05/27/24 1123   131/86 93 20 98.6 °F (37 °C) 96 %      MAP       --                 Physical Exam    Nursing note and vitals reviewed.  Constitutional: She appears well-developed and well-nourished. She is not diaphoretic. No distress.   HENT:   Head: Normocephalic and atraumatic.   Mouth/Throat: Oropharynx is clear and moist. No oropharyngeal exudate.   Eyes: Conjunctivae and EOM are normal. Pupils are equal, round, and reactive to light. Right eye exhibits no discharge. Left eye exhibits no discharge.   Neck: Neck supple. No JVD present.   Normal range of motion.  Cardiovascular:  Normal rate, regular rhythm, normal heart sounds and intact distal pulses.     Exam reveals no gallop and no friction rub.       No murmur heard.  Pulmonary/Chest: Breath sounds normal. No respiratory distress. She has no wheezes. She has no rhonchi. She has no rales. She exhibits no tenderness.   Abdominal: Abdomen is soft. Bowel sounds are normal. She exhibits no distension. There is no abdominal tenderness. There is no rebound and no guarding.   Musculoskeletal:         General: No tenderness or edema.      Cervical back: Normal range of motion and neck supple.      Comments: Pain over the left foot plantar fascia. Full ROM of the left ankle, knee, hip, elbow, and wrist. (Patient reports pain with standing). Pain with ROM of the left shoulder. No midline neck or back tenderness.     Lymphadenopathy:     She has no cervical adenopathy.   Neurological: She is alert and oriented to person, place, and time. She has normal strength. No cranial nerve deficit or sensory deficit. GCS score is 15. GCS eye subscore is 4. GCS verbal subscore is 5. GCS motor subscore is 6.   Moves all extremities and carries on conversation. CN- II: PERRL; III/IV/VI: EOMI w/out evidence of nystagmus; V: no deficits appreciated to light touch bilateral face; VII: no facial weakness, no facial asymmetry. Eyebrow raise symmetric. Smile symmetric; IX/X: palate midline, and raises symmetrically; XI: shoulder shrug 5/5 bilaterally; XII: tongue is  midline w/out asymmetry. Strength 5/5 to bilateral upper and lower extremities, sensation intact to light touch,   Skin: Skin is warm and dry. Capillary refill takes less than 2 seconds.   Psychiatric: She has a normal mood and affect. Thought content normal.         ED Course   Procedures  Labs Reviewed   CBC W/ AUTO DIFFERENTIAL - Abnormal; Notable for the following components:       Result Value    MCHC 30.6 (*)     Immature Granulocytes 0.6 (*)     Immature Grans (Abs) 0.05 (*)     All other components within normal limits   COMPREHENSIVE METABOLIC PANEL - Abnormal; Notable for the following components:    Anion Gap 7 (*)     All other components within normal limits   URINALYSIS, REFLEX TO URINE CULTURE - Abnormal; Notable for the following components:    Leukocytes, UA 2+ (*)     All other components within normal limits    Narrative:     Specimen Source->Urine   URINALYSIS MICROSCOPIC    Narrative:     Specimen Source->Urine        ECG Results              EKG 12-lead (Final result)        Collection Time Result Time QRS Duration OHS QTC Calculation    05/27/24 11:09:49 05/27/24 20:40:30 80 462                     Final result by Interface, Lab In [facility] (05/27/24 20:40:31)                   Narrative:    Test Reason : R07.9,    Vent. Rate : 097 BPM     Atrial Rate : 271 BPM     P-R Int : 000 ms          QRS Dur : 080 ms      QT Int : 364 ms       P-R-T Axes : 083 018 -78 degrees     QTc Int : 462 ms    Atrial flutter with variable A-V block  ST and T wave abnormality, consider anterolateral ischemia  Prolonged QT  Abnormal ECG  When compared with ECG of 11-JAN-2024 15:07,  Significant changes have occurred  Confirmed by Dharmesh MCMULLEN, Rebecca HUFFMAN ([id number]) on 5/27/2024 8:40:24 PM    Referred By: [email or web address]   SELF           Confirmed By:Rebecca Barroso MD                                  Imaging Results              X-Ray Hip 2 or 3 views Left with Pelvis when performed (Final result)  Result time 05/27/24 15:13:36       Final result by Abraham Samaniego DO (05/27/24 15:13:36)                   Impression:      No acute fracture or dislocation.      Electronically signed by: Abraham Samaniego  Date:    05/27/2024  Time:    15:13               Narrative:    EXAMINATION:  XR HIP WITH PELVIS WHEN PERFORMED 2 OR 3 VIEWS LEFT    CLINICAL HISTORY:  Pain in unspecified hip    TECHNIQUE:  AP view of the pelvis and frog leg lateral view of the left hip were performed.    COMPARISON:  None    FINDINGS:  There is osteopenia.  There is no acute fracture or dislocation.  Alignment is normal.  The femoral heads are well seated in the acetabula.  Mild joint space narrowing of the bilateral femoroacetabular joints and the pubic symphysis.  There are degenerative changes of the partially visualized lower lumbar spine and the bilateral sacroiliac joints.                                       X-Ray Shoulder Trauma Left (Final result)  Result time 05/27/24 15:12:54      Final result by Abraham Samaniego DO (05/27/24 15:12:54)                   Impression:      No acute fracture or dislocation.      Electronically signed by: Abraham Samaniego  Date:    05/27/2024  Time:    15:12               Narrative:    EXAMINATION:  XR SHOULDER TRAUMA 3 VIEW LEFT    CLINICAL HISTORY:  Pain in unspecified shoulder    TECHNIQUE:  Three views of the left shoulder were performed.    COMPARISON  None    FINDINGS:  There is osteopenia.  There is no acute fracture or dislocation.  The humeral head is well seated in the glenoid.  There is elevation of the humeral head which can be seen with rotator cuff dysfunction or tear.  Alignment is otherwise unremarkable.  There are degenerative changes of the acromioclavicular joint.                                       X-Ray Chest AP Portable (Final result)  Result time 05/27/24 15:12:14      Final result by Abraham Samaniego DO (05/27/24 15:12:14)                   Impression:      No acute abnormality.      Electronically signed  by: Abraham Samaniego  Date:    05/27/2024  Time:    15:12               Narrative:    EXAMINATION:  XR CHEST AP PORTABLE    CLINICAL HISTORY:  Chest Pain;    TECHNIQUE:  Single frontal view of the chest was performed.    COMPARISON:  01/11/2024.    FINDINGS:  Stable calcified granuloma in the left lung.  The lungs are otherwise clear.  The pleural spaces are clear.  The cardiac silhouette is enlarged.  Osseous structures demonstrate degenerative changes.  There are vascular calcifications.                                       CT Cervical Spine Without Contrast (Final result)  Result time 05/27/24 13:49:19      Final result by Elliott Lucero MD (05/27/24 13:49:19)                   Impression:      1. Allowing for motion artifact, no convincing acute displaced fracture or dislocation of the cervical spine.  Please see above for additional findings.      Electronically signed by: Elliott Lucero MD  Date:    05/27/2024  Time:    13:49               Narrative:    EXAMINATION:  CT CERVICAL SPINE WITHOUT CONTRAST    CLINICAL HISTORY:  Spinal stenosis, cervical;    TECHNIQUE:  Low dose axial images, sagittal and coronal reformations were performed though the cervical spine.  Contrast was not administered.    COMPARISON:  None    FINDINGS:  There is motion artifact.  There is osteopenia.    The visualized portions of the lung apices are unremarkable.  Thyroid gland has a lobular appearance.  The visualized portions of the parotid glands and remaining visualized salivary glands are grossly unremarkable.  No tonsillar enlargement.  No significant cervical lymphadenopathy.  There is carotid vascular calcification.  The posterior paraspinous and hypopharyngeal soft tissues are unremarkable.  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    Sagittal reformatted imaging demonstrates adequate alignment of the cervical spine without significant vertebral body height loss.  There is  multilevel disc space height loss and disc degenerative disease.  There is multilevel facet arthropathy.  No acute displaced fracture or dislocation of the cervical spine.  Motion artifact limits evaluation for spondylitic changes.  There is multilevel moderate spinal canal stenosis and neuroforaminal narrowing.  The airway is patent.                                       Medications   ketorolac injection 9.999 mg (9.999 mg Intravenous Given 5/27/24 6283)     Medical Decision Making  Amount and/or Complexity of Data Reviewed  Independent Historian: guardian     Details: See HPI.  Labs: ordered.  Radiology: ordered.  ECG/medicine tests:  Decision-making details documented in ED Course.    Risk  Prescription drug management.    MDM  83-year-old female past history of hypertension, spinal stenosis, thyroid dysfunction, hyperlipidemia, aflutter on eliquis presents with pain on the left side of her body for the last 3 weeks.  She states the pain is in her shoulder, and then radiates down to her hips as well as down to her foot.  She denies any chest pain or shortness for breath.  She denies any neck pain.  She reports pain is worse with movement of her arm as well as standing.  She was not yet seen a doctor for this.  She was taking ibuprofen at home with relief.  She was concerned that her hip is fractured.  She denies any trauma or falls with states the pain is severe in her hip when she stands.  Denies any back pain.  Denies any numbness or weakness.  Denies any bowel or bladder complaints or any saddle numbness.  She does report that she had some urinary symptoms a few weeks ago and took ?azo with resolution.  She was a poor historian despite using the  so she was unsure if which he took with azo versus antibiotics.  Patient also reports many months of pain to the left arch of the foot.  On exam this is consistent with pain over the plantar fascia.  She does not wear she was with arch support.  We  discussed needing to wear heels with arch support at all times including when in the house.  Differential diagnosis includes but not limited to spinal stenosis, osteoarthritis, radiculopathy, occult injury, rotator cuff tear.  She was no chest pain this pain is only present with movement of the shoulder or standing I do not see any indication that this could be ACS or aortic dissection.  She was able to range her ankle, knee, hip without pain in the bed.  She was no midline neck or back tenderness.  She does have pain with range of motion of her left shoulder.  Her EKG is a flutter unchanged from her previous.  We will obtain basic labs, x-ray of her shoulder, x-ray of her hip, CT of her cervical spine for further evaluation and care.  We discussed this may be secondary to radiculopathy causing her symptoms.  She was not yet seen an orthopedist.  Anticipated for, does not show any acute emergent findings to have her follow up closely with orthopedist for further evaluation and care.  Patient's most pressing concern is that her hip is fracture so we will add an x-ray of her hip for reassurance of the patient.    Urine with no UTI on micro  No leukocytosis or anemia  No metabolic derangement  Hip Xray no fracture   Shoulder xray no fracture or dislocation   CXR NAF  CT cervical spine with moderate spinal canal stenosis and neuroforaminal narrowing, DDD    Discussed these results with patient and daughter using . Discussed strict return precautions. Discussed close follow up with ortho and spine doctor to discuss pain as well as with PCP. Patient in agreement with plan and all questions answered.             Scribe Attestation:   Scribe #1: I performed the above scribed service and the documentation accurately describes the services I performed. I attest to the accuracy of the note.        ED Course as of 05/28/24 0055   Mon May 27, 2024   1220 EKG 12-lead  A flutter with variable AV block at 97.  No ST  elevation.  Patient does have T-wave inversions in 2 3 AVF V4 V5 V6 likely although this is difficult to ascertain given patient has a flutter.  QTC is 462.  When comparing this EKG to her most recently from January this EKG looks similar including the morphology of the T-wave inversions in his previous leads. [JT]   1221 Patient requires , currently being used by other patients, quick eval patient appears well and in no distress, will evaluate fully with  when available.  [JT]      ED Course User Index  [JT] Salina Nowak MD                     I, Salina Nowak, personally performed the services described in this documentation. All medical record entries made by the scribe were at my direction and in my presence. I have reviewed the chart and agree that the record reflects my personal performance and is accurate and complete.        Clinical Impression:  Final diagnoses:  [M25.519] Shoulder pain  [M25.559] Hip pain  [M79.672] Left foot pain (Primary)  [M48.00] Spinal stenosis, unspecified spinal region          ED Disposition Condition    Discharge Stable          ED Prescriptions       Medication Sig Dispense Start Date End Date Auth. Provider    meloxicam (MOBIC) 15 MG tablet Take 1 tablet (15 mg total) by mouth daily as needed for Pain. 30 tablet 5/27/2024 -- Salina Nowak MD    LIDOcaine (LIDODERM) 5 % Place 1 patch onto the skin daily as needed (pain). Remove & Discard patch within 12 hours, apply only to intact skin. 10 patch 5/27/2024 6/6/2024 Salina Nowak MD          Follow-up Information       Follow up With Specialties Details Why Contact Info    Kurt Mcdaniesl MD Family Medicine Schedule an appointment as soon as possible for a visit in 2 days to discuss recent ED visit, to establish primary doctor [address] [place] [address]  [phone]      [facility] - Emergency Dept Emergency Medicine  As needed, If symptoms worsen [address] [facility] [address]  [phone]    [facility] Orthopedics Schedule an appointment as soon as possible for a visit in 2 days to discuss recent ED visit [address]  [facility] [place] [address]  [phone]             Salina Nowak MD  05/28/24 0055

## 2024-11-14 ENCOUNTER — PATIENT OUTREACH (OUTPATIENT)
Dept: ADMINISTRATIVE | Facility: OTHER | Age: 84
End: 2024-11-14
Payer: MEDICARE

## 2024-11-14 ENCOUNTER — HOSPITAL ENCOUNTER (EMERGENCY)
Facility: HOSPITAL | Age: 84
Discharge: HOME | End: 2024-11-14
Attending: EMERGENCY MEDICINE
Payer: MEDICARE

## 2024-11-14 VITALS
OXYGEN SATURATION: 97 % | HEIGHT: 62 IN | TEMPERATURE: 98 F | SYSTOLIC BLOOD PRESSURE: 130 MMHG | RESPIRATION RATE: 18 BRPM | BODY MASS INDEX: 49.13 KG/M2 | DIASTOLIC BLOOD PRESSURE: 81 MMHG | WEIGHT: 267 LBS | HEART RATE: 76 BPM

## 2024-11-14 DIAGNOSIS — M54.41 CHRONIC LOW BACK PAIN WITH BILATERAL SCIATICA, UNSPECIFIED BACK PAIN LATERALITY: Primary | ICD-10-CM

## 2024-11-14 DIAGNOSIS — M43.9 COMPRESSION DEFORMITY OF VERTEBRA: ICD-10-CM

## 2024-11-14 DIAGNOSIS — G89.29 CHRONIC LOW BACK PAIN WITH BILATERAL SCIATICA, UNSPECIFIED BACK PAIN LATERALITY: Primary | ICD-10-CM

## 2024-11-14 DIAGNOSIS — M54.42 CHRONIC LOW BACK PAIN WITH BILATERAL SCIATICA, UNSPECIFIED BACK PAIN LATERALITY: Primary | ICD-10-CM

## 2024-11-14 PROCEDURE — 99284 EMERGENCY DEPT VISIT MOD MDM: CPT | Mod: 25

## 2024-11-14 PROCEDURE — 25000003 PHARM REV CODE 250: Performed by: EMERGENCY MEDICINE

## 2024-11-14 PROCEDURE — 96372 THER/PROPH/DIAG INJ SC/IM: CPT | Performed by: EMERGENCY MEDICINE

## 2024-11-14 PROCEDURE — 63600175 PHARM REV CODE 636 W HCPCS: Performed by: EMERGENCY MEDICINE

## 2024-11-14 RX ORDER — TRAMADOL HYDROCHLORIDE 50 MG/1
50 TABLET ORAL EVERY 6 HOURS PRN
Qty: 12 TABLET | Refills: 0 | Status: SHIPPED | OUTPATIENT
Start: 2024-11-14

## 2024-11-14 RX ORDER — DEXAMETHASONE SODIUM PHOSPHATE 4 MG/ML
4 INJECTION, SOLUTION INTRA-ARTICULAR; INTRALESIONAL; INTRAMUSCULAR; INTRAVENOUS; SOFT TISSUE
Status: COMPLETED | OUTPATIENT
Start: 2024-11-14 | End: 2024-11-14

## 2024-11-14 RX ORDER — TRAMADOL HYDROCHLORIDE 50 MG/1
50 TABLET ORAL
Status: COMPLETED | OUTPATIENT
Start: 2024-11-14 | End: 2024-11-14

## 2024-11-14 RX ADMIN — TRAMADOL HYDROCHLORIDE 50 MG: 50 TABLET, COATED ORAL at 12:11

## 2024-11-14 RX ADMIN — DEXAMETHASONE SODIUM PHOSPHATE 4 MG: 4 INJECTION INTRA-ARTICULAR; INTRALESIONAL; INTRAMUSCULAR; INTRAVENOUS; SOFT TISSUE at 12:11

## 2024-11-14 NOTE — ED PROVIDER NOTES
"Encounter Date: 11/14/2024    SCRIBE #1 NOTE: I, Uri Galvan, am scribing for, and in the presence of,  Katherin Thayer MD.       History     Chief Complaint   Patient presents with    Foot Pain     85 yo fem to triage for bilateral hip and feet pain. Pain goes down her legs all the way to her feet. She is now unable to stand or walk for longer than 5 minutes due to the pain. Pt has cane w/ her in triage but sitting in wheelchair.     Hip Pain     84 y.o. female with PMHx of afib, prediabetes, arthritis, chronic back pain, HLD, HTN, spinal stenosis, thyroid disease, presents to the ED for evaluation of worsening intermittent bilateral plantar feet pain radiating up the posterior legs and into the lower back x 5-6 years. Patient reports that the pain is described as a "striking" pain and that the pain prevents her from standing for prolong periods of time and walking. She states that when the pain is severe, it will intermittently cause her to urinate on herself. Attempted treatment for symptoms with ibuprofen and gabapentin. Denies any other associated symptoms.     Patient reports seeing doctors here who prescribed pills for her but have not determined what is causing the pain prompting her to come to the emergency department today.    The history is provided by the patient. The history is limited by a language barrier. A  was used (RN,  [id number]).     Review of patient's allergies indicates:  No Known Allergies  Past Medical History:   Diagnosis Date    Arthritis     Chronic back pain     High cholesterol     Hypertension     Spinal stenosis     Thyroid disease      Past Surgical History:   Procedure Laterality Date    EYE SURGERY      TREATMENT OF CARDIAC ARRHYTHMIA N/A 3/10/2022    Procedure: Cardioversion or Defibrillation;  Surgeon: David Dillon MD;  Location: [facility] CATH LAB;  Service: Cardiology;  Laterality: N/A;    TUBAL LIGATION      Uterine suspension       Family " History   Problem Relation Name Age of Onset    Breast cancer Neg Hx      Colon cancer Neg Hx      Ovarian cancer Neg Hx       Social History     Tobacco Use    Smoking status: Never    Smokeless tobacco: Never   Substance Use Topics    Alcohol use: No    Drug use: No     Review of Systems   Constitutional:  Negative for fever.   HENT:  Negative for congestion and sore throat.    Respiratory:  Negative for cough and shortness of breath.    Cardiovascular:  Negative for chest pain.   Gastrointestinal:  Negative for abdominal pain, nausea and vomiting.   Genitourinary:  Negative for difficulty urinating and dysuria.   Musculoskeletal:  Positive for back pain and myalgias.   Skin:  Negative for wound.   Neurological:  Negative for weakness, numbness and headaches.   Psychiatric/Behavioral:  Negative for decreased concentration.        Physical Exam     Initial Vitals [11/14/24 1018]   BP Pulse Resp Temp SpO2   130/81 76 17 98.4 °F (36.9 °C) 97 %      MAP       --         Physical Exam    Nursing note and vitals reviewed.  Constitutional: She appears well-developed and well-nourished. She is not diaphoretic. No distress.   Body mass index is 48.83 kg/m².     HENT:   Head: Normocephalic and atraumatic.   Eyes: Conjunctivae are normal.   Neck: Neck supple.   Cardiovascular:  Normal rate and regular rhythm.           Pulmonary/Chest: Breath sounds normal.   Abdominal: Abdomen is soft. There is no abdominal tenderness.   Musculoskeletal:         General: No edema.      Cervical back: Neck supple.      Comments: 1+ DP pulses bilaterally. 5/5 plantarflexion and dorsiflexion bilaterally. Sensation to light touch intact bilaterally. No pitting edema bilaterally. 0 patellar reflex. Negative Babinski bilaterally.      Neurological: She is alert. GCS score is 15. GCS eye subscore is 4. GCS verbal subscore is 5. GCS motor subscore is 6.   5/5 strength in lower extremities, sensation to light touch is intact.   Skin: Skin is warm  and dry.   Psychiatric: She has a normal mood and affect.         ED Course   Procedures  Labs Reviewed - No data to display       Imaging Results              X-Ray Lumbar Spine Ap And Lateral (Final result)  Result time 11/14/24 11:49:43      Final result by Leslie Hoover MD (11/14/24 11:49:43)                   Impression:      Spondylosis of lumbar spine.    Wedge configuration of the L1 vertebrae with mild-moderate loss of vertebral body height, unknown chronicity, favored to be chronic.  To correlate clinically.      Electronically signed by: Leslie Hoover MD  Date:    11/14/2024  Time:    11:49               Narrative:    EXAMINATION:  XR LUMBAR SPINE AP AND LATERAL    CLINICAL HISTORY:  low back pain;    TECHNIQUE:  AP, lateral and spot images were performed of the lumbar spine.    COMPARISON:  08/17/2013    FINDINGS:  The alignment of the lumbar spine is normal.    Wedge configuration of the L1 vertebrae with mild-moderate loss vertebral body height.    Disc space narrowing T12-L1 and L5-S1.    Small anterior and lateral marginal osteophytes noted at T12-L1 and small anterior osteophyte throughout the lumbar spine..    No osseous lesions.    The sacroiliac joints appear symmetrical on the AP view.    The soft tissues appear normal.                                       Medications   traMADoL tablet 50 mg (50 mg Oral Given 11/14/24 1205)   dexAMETHasone injection 4 mg (4 mg Intramuscular Given 11/14/24 1206)     Medical Decision Making  84-year-old female with history of prediabetes, AFib, hypertension, hyperlipidemia, morbid obesity presents to the emergency department with 5-6 year history of foot pain.  Patient reports bilateral plantar foot pain, this radiates to the back of her legs and up to her back.  She reports pain prevents her from standing on her feet for long periods of time.  She has taken Motrin and gabapentin for her symptoms.  She presents today because she is sick of having  the pain.  On exam, the patient is neurovascularly intact in her lower extremities.  Strength and sensation are intact.  0+ patellar reflexes, negative Babinski bilaterally.  Negative straight leg test bilaterally.  Differential includes not limited to plantar fasciitis, neuropathic pain, sciatica.  Given chronicity of symptoms, I do not suspect cauda equina syndrome, fracture or dislocation.  Will get x-ray to rule out bony pathology.  Will treat with tramadol, Decadron.    Amount and/or Complexity of Data Reviewed  Radiology: ordered. Decision-making details documented in ED Course.    Risk  Prescription drug management.            Scribe Attestation:   Scribe #1: I performed the above scribed service and the documentation accurately describes the services I performed. I attest to the accuracy of the note.        ED Course as of 11/14/24 1249   Thu Nov 14, 2024   1236 Discussed case with SHABANA Monterroso, who reviewed XR.  Compression deformity appears chronic, the patient's symptoms are confirmed to be chronic, 5-6 years duration.  She is able to see the patient in clinic next week.  I reviewed the test results using a  with the patient, confirmed chronicity of her symptoms.  Agreeable to follow-up in clinic on Tuesday. [LH]      ED Course User Index  [LH] Katherin Thayer MD                         I, Katherin Thayer MD, personally performed the services described in this documentation. All medical record entries made by the scribe were at my direction and in my presence. I have reviewed the chart and agree that the record reflects my personal performance and is accurate and complete.      DISCLAIMER: This note was prepared with [employer] voice recognition transcription software. Garbled syntax, mangled pronouns, and other bizarre constructions may be attributed to that software system.      Clinical Impression:  Final diagnoses:  [M54.41, G89.29, M54.42] Chronic low back pain with bilateral  sciatica, unspecified back pain laterality (Primary)  [M43.9] Compression deformity of vertebra          ED Disposition Condition    Discharge Stable          ED Prescriptions       Medication Sig Dispense Start Date End Date Auth. Provider    traMADoL (ULTRAM) 50 mg tablet Take 1 tablet (50 mg total) by mouth every 6 (six) hours as needed for Pain. 12 tablet 11/14/2024 -- Katherin Thayer MD          Follow-up Information       Follow up With Specialties Details Why Contact Info    Keyla Garcia PA-C Neurosurgery Go on 11/19/2024 at 1 pm [address]  [address]  [facility] [address]  [phone]      [facility] - Emergency Dept Emergency Medicine  As needed, If symptoms worsen [address] [facility] [place] [id number] [phone]             Katherin Thayer MD  11/14/24 6076

## 2024-11-14 NOTE — PROGRESS NOTES
CHW - Initial Contact    This Community Health Worker completed the Social Determinant of Health questionnaire with patient  at bedside  today.    Pt identified barriers of most importance are: has no needs at this time.   Referrals to community agencies completed with patient/caregiver consent outside of [facility] include: no: none at this time.  Referrals were put through [facility] - no: none at this time.  Support and Services: has no support at this time.  Other information discussed the patient needs / wants help with: Completed SDOH with patient at bedside today, pt has no needs at this time. Will follow up in one week to check pt's future needs.    Follow up required: yes.  Follow-up Outreach - Due: 11/20/2024

## 2024-11-14 NOTE — ED TRIAGE NOTES
Pt came to the ED c/o right foot pain that radiates up the right leg to the lower back. Pt states this has been going on for 6 years. Pt states that it has become worse and hard for her to stand on her feet for long periods of time.

## 2024-11-18 ENCOUNTER — TELEPHONE (OUTPATIENT)
Dept: NEUROSURGERY | Facility: CLINIC | Age: 84
End: 2024-11-18
Payer: MEDICARE

## 2024-11-19 ENCOUNTER — OFFICE VISIT (OUTPATIENT)
Dept: NEUROSURGERY | Facility: CLINIC | Age: 84
End: 2024-11-19
Payer: MEDICARE

## 2024-11-19 VITALS
DIASTOLIC BLOOD PRESSURE: 60 MMHG | BODY MASS INDEX: 50 KG/M2 | HEART RATE: 90 BPM | OXYGEN SATURATION: 96 % | SYSTOLIC BLOOD PRESSURE: 124 MMHG | WEIGHT: 273.38 LBS

## 2024-11-19 DIAGNOSIS — M54.41 CHRONIC BILATERAL LOW BACK PAIN WITH BILATERAL SCIATICA: Primary | ICD-10-CM

## 2024-11-19 DIAGNOSIS — G89.29 CHRONIC PAIN OF BOTH HIPS: ICD-10-CM

## 2024-11-19 DIAGNOSIS — M54.42 CHRONIC BILATERAL LOW BACK PAIN WITH BILATERAL SCIATICA: Primary | ICD-10-CM

## 2024-11-19 DIAGNOSIS — M25.552 CHRONIC PAIN OF BOTH HIPS: ICD-10-CM

## 2024-11-19 DIAGNOSIS — M25.551 CHRONIC PAIN OF BOTH HIPS: ICD-10-CM

## 2024-11-19 DIAGNOSIS — G89.29 CHRONIC BILATERAL LOW BACK PAIN WITH BILATERAL SCIATICA: Primary | ICD-10-CM

## 2024-11-19 PROCEDURE — 3288F FALL RISK ASSESSMENT DOCD: CPT | Mod: CPTII,S$GLB,, | Performed by: PHYSICIAN ASSISTANT

## 2024-11-19 PROCEDURE — 99204 OFFICE O/P NEW MOD 45 MIN: CPT | Mod: S$GLB,,, | Performed by: PHYSICIAN ASSISTANT

## 2024-11-19 PROCEDURE — 3078F DIAST BP <80 MM HG: CPT | Mod: CPTII,S$GLB,, | Performed by: PHYSICIAN ASSISTANT

## 2024-11-19 PROCEDURE — 1125F AMNT PAIN NOTED PAIN PRSNT: CPT | Mod: CPTII,S$GLB,, | Performed by: PHYSICIAN ASSISTANT

## 2024-11-19 PROCEDURE — 1101F PT FALLS ASSESS-DOCD LE1/YR: CPT | Mod: CPTII,S$GLB,, | Performed by: PHYSICIAN ASSISTANT

## 2024-11-19 PROCEDURE — 3074F SYST BP LT 130 MM HG: CPT | Mod: CPTII,S$GLB,, | Performed by: PHYSICIAN ASSISTANT

## 2024-11-19 PROCEDURE — 1160F RVW MEDS BY RX/DR IN RCRD: CPT | Mod: CPTII,S$GLB,, | Performed by: PHYSICIAN ASSISTANT

## 2024-11-19 PROCEDURE — 1159F MED LIST DOCD IN RCRD: CPT | Mod: CPTII,S$GLB,, | Performed by: PHYSICIAN ASSISTANT

## 2024-11-20 NOTE — PROGRESS NOTES
[facility]  Neurosurgery    SUBJECTIVE:     **Indian speaking only.  utilized for the entire visit.     History of Present Illness:  Amy Schreiber is a 84 y.o. female with morbid obesity, HTN, and HLD who presents with chronic back and b/l leg pain.  Symptoms has been present for > 15 years.  She reports pain radiating from her b/l hips and low back through her buttocks and down the backs of both legs.  The pain increases with standing and walking.  She can not walk for more than 10 minutes before feeling off balance in her feet giving out.  When walking, she feels like she was walking with a limp and notes significant hip pain radiating to the heels of her feet.  She denies any overt numbness or focal weakness.  She has never tried physical therapy or injections for her symptoms.  She presents with an x-ray of her low back showing a chronic appearing L1 compression deformity.  No recent advanced lumbar spine imaging.        (Not in a hospital admission)      Review of patient's allergies indicates:  No Known Allergies    Past Medical History:   Diagnosis Date    Arthritis     Chronic back pain     High cholesterol     Hypertension     Spinal stenosis     Thyroid disease      Past Surgical History:   Procedure Laterality Date    EYE SURGERY      TREATMENT OF CARDIAC ARRHYTHMIA N/A 3/10/2022    Procedure: Cardioversion or Defibrillation;  Surgeon: David Dillon MD;  Location: [facility] CATH LAB;  Service: Cardiology;  Laterality: N/A;    TUBAL LIGATION      Uterine suspension       Family History   Problem Relation Name Age of Onset    Breast cancer Neg Hx      Colon cancer Neg Hx      Ovarian cancer Neg Hx       Social History     Tobacco Use    Smoking status: Never    Smokeless tobacco: Never   Substance Use Topics    Alcohol use: Never    Drug use: Never        Review of Systems:  As noted in HPI    OBJECTIVE:     Vital Signs (Most Recent):  Vitals:    11/19/24 1249   BP: 124/60    Pulse: 90   SpO2: 96%   Weight: 124 kg (273 lb 5.9 oz)   PainSc: 10-Worst pain ever   PainLoc: Back     Body mass index is 50 kg/m².      Physical Exam:  General: well developed, well nourished, no distress  Head: normocephalic, atraumatic  Neurologic: Alert and oriented. Thought content appropriate  GCS: Motor: 6/Verbal: 5/Eyes: 4 GCS Total: 15  Language: No aphasia  Speech: No dysarthria  Cranial nerves: face symmetric, tongue midline, CN II-XII grossly intact.   Eyes: pupils equal, round, reactive to light with accommodation, EOMI.   Pulmonary: normal respirations, not labored, no accessory muscles used    Sensory: intact to light touch throughout b/l upper and lower extremities    Motor Strength: Moves all extremities spontaneously with good tone.  Full strength upper and lower extremities. No abnormal movements seen.     Strength  Deltoids Triceps Biceps   Hand    Upper: R 5/5 5/5 5/5   5/5    L 5/5 5/5 5/5   5/5     Iliopsoas Quadriceps  Tibialis  anterior Gastro- cnemius EHL   Lower: R 5/5 5/5  5/5 5/5 5/5    L 5/5 5/5  5/5 5/5 5/5     DTR's - 2 + and symmetric patellar  Eugene: absent  Clonus: absent    Gait:  Antalgic      Diagnostic Results:  I have personally reviewed imaging and agree with the findings.     Upright x-ray lumbar spine 11/14/2024:   Spondylosis of lumbar spine.     Wedge configuration of the L1 vertebrae with mild-moderate loss of vertebral body height, unknown chronicity, favored to be chronic.  To correlate clinically.    X-ray left hip 05/27/2024   There is osteopenia.  There is no acute fracture or dislocation.  Alignment is normal.  The femoral heads are well seated in the acetabula.  Mild joint space narrowing of the bilateral femoroacetabular joints and the pubic symphysis.  There are degenerative changes of the partially visualized lower lumbar spine and the bilateral sacroiliac joints.      ASSESSMENT/PLAN:     Amy Schreiber is a 84 y.o. female who presents with  chronic back, b/l hip, and posterior leg pain with symptoms concerning for hip pathology and/or neurogenic claudication.  She was neurologically intact on exam.  She was never tried conservative interventions including PT or injections.  She was no advanced imaging of her lumbar spine.    I spent 45 minutes with the patient reviewing her pain complaints as well as evaluating her for signs of myelopathy.  Her exam is largely benign other than the reported pain.  It is possible that she has severe lumbar stenosis contributing to neurogenic claudication.  However her hips are largest complaint and it may be related to underlying hip pathology as well.  I believe a trial of physical therapy is indicated before any advanced imaging.      I also believe she needs to work on weight management.  At her current BMI of 50, she would not be a candidate for elective lumbar spine surgery should it be indicated.    If symptoms fail to improve over the next 3 months, we can consider a lumbar MRI and/or orthopedic referral for a hip evaluation.      UPDATE:   At check out, it was discovered that the patient's insurance is not accepted at [facility].  I am unable to schedule a follow up with her.  It appears she was allowed 1 visit as an ED follow up, which was completed today.  I have referred the patient to Dr. Castillo in Neurosurgery at [facility].  I will keep her PT referral at [facility].  This can be adjusted if there insurance issues there as well.    Please feel free to call with any further questions      Keyla Garcia PA-C  [facility]  Department of Neurosurgery  [phone]    Disclaimer: This note was dictated by speech recognition. Minor errors in transcription may be present.  Please call with any questions.

## 2024-12-20 ENCOUNTER — PATIENT OUTREACH (OUTPATIENT)
Dept: ADMINISTRATIVE | Facility: OTHER | Age: 84
End: 2024-12-20
Payer: MEDICARE

## 2024-12-20 NOTE — PROGRESS NOTES
CHW - Follow Up    This Community Health Worker completed a follow up visit with patient via telephone today.  Pt/Caregiver reported: Patient has issues with transportation.  Community Health Worker provided: mailed referral for transportation, will follow up in one week to check status of referral and pt's future needs.  Follow up required: yes.  Follow-up Outreach - Due: 12/26/2024     No

## 2025-02-03 ENCOUNTER — TELEPHONE (OUTPATIENT)
Dept: NEUROSURGERY | Facility: CLINIC | Age: 85
End: 2025-02-03
Payer: MEDICARE

## 2025-02-05 ENCOUNTER — TELEPHONE (OUTPATIENT)
Dept: ORTHOPEDICS | Facility: CLINIC | Age: 85
End: 2025-02-05
Payer: MEDICARE

## 2025-02-05 NOTE — TELEPHONE ENCOUNTER
----- Message from Lory sent at 2/5/2025  3:30 PM CST -----  Type:  Sooner Appointment Request    Caller is requesting a sooner appointment.  Caller declined first available appointment listed below.  Caller will not accept being placed on the waitlist and is requesting a message be sent to doctor.  Name of Caller:pt  When is the first available appointment?n/a  Symptoms:ER f/u- Hip Problems  Would the patient rather a call back or a response via [employer]? call  Best Call Back Number: [phone]  Additional Information:

## 2025-02-06 ENCOUNTER — TELEPHONE (OUTPATIENT)
Dept: ORTHOPEDICS | Facility: CLINIC | Age: 85
End: 2025-02-06
Payer: MEDICARE

## 2025-02-06 NOTE — TELEPHONE ENCOUNTER
----- Message from Abdoul sent at 2/6/2025  9:07 AM CST -----  Type:  Patient Returning Call    Who Called: pt's  [place]  Who Left Message for Patient: Rhonda Ann MA  Does the patient know what this is regarding?: yes  Would the patient rather a call back or a response via [facility]? call  Best Call Back Number:   ([place])  Additional Information:

## 2025-02-11 ENCOUNTER — HOSPITAL ENCOUNTER (OUTPATIENT)
Facility: HOSPITAL | Age: 85
Discharge: HOME | End: 2025-02-12
Attending: STUDENT IN AN ORGANIZED HEALTH CARE EDUCATION/TRAINING PROGRAM | Admitting: HOSPITALIST
Payer: MEDICARE

## 2025-02-11 DIAGNOSIS — R07.9 CHEST PAIN: ICD-10-CM

## 2025-02-11 DIAGNOSIS — R07.89 ATYPICAL CHEST PAIN: Primary | ICD-10-CM

## 2025-02-11 DIAGNOSIS — I21.4 NON-ST ELEVATION MYOCARDIAL INFARCTION (NSTEMI): ICD-10-CM

## 2025-02-11 PROBLEM — I48.11 LONGSTANDING PERSISTENT ATRIAL FIBRILLATION: Chronic | Status: ACTIVE | Noted: 2023-11-02

## 2025-02-11 PROBLEM — I48.11 LONGSTANDING PERSISTENT ATRIAL FIBRILLATION: Status: ACTIVE | Noted: 2023-11-02

## 2025-02-11 PROBLEM — J44.9 CHRONIC OBSTRUCTIVE PULMONARY DISEASE, UNSPECIFIED: Status: ACTIVE | Noted: 2024-12-29

## 2025-02-11 PROBLEM — F33.9 MAJOR DEPRESSIVE DISORDER, RECURRENT, UNSPECIFIED: Status: ACTIVE | Noted: 2021-09-27

## 2025-02-11 PROBLEM — Z74.09 IMPAIRED MOBILITY AND ACTIVITIES OF DAILY LIVING: Chronic | Status: ACTIVE | Noted: 2023-04-12

## 2025-02-11 PROBLEM — I48.92 ATRIAL FLUTTER WITH RAPID VENTRICULAR RESPONSE: Status: RESOLVED | Noted: 2022-03-09 | Resolved: 2025-02-11

## 2025-02-11 PROBLEM — I50.22 CHRONIC SYSTOLIC CONGESTIVE HEART FAILURE: Status: ACTIVE | Noted: 2024-02-02

## 2025-02-11 PROBLEM — Z78.9 IMPAIRED MOBILITY AND ACTIVITIES OF DAILY LIVING: Status: ACTIVE | Noted: 2023-04-12

## 2025-02-11 PROBLEM — Z78.9 IMPAIRED MOBILITY AND ACTIVITIES OF DAILY LIVING: Chronic | Status: ACTIVE | Noted: 2023-04-12

## 2025-02-11 PROBLEM — J44.9 CHRONIC OBSTRUCTIVE PULMONARY DISEASE, UNSPECIFIED: Chronic | Status: ACTIVE | Noted: 2024-12-29

## 2025-02-11 PROBLEM — I73.9 PERIPHERAL VASCULAR DISEASE: Chronic | Status: ACTIVE | Noted: 2025-01-24

## 2025-02-11 PROBLEM — I73.9 PERIPHERAL VASCULAR DISEASE: Status: ACTIVE | Noted: 2025-01-24

## 2025-02-11 PROBLEM — F33.9 MAJOR DEPRESSIVE DISORDER, RECURRENT, UNSPECIFIED: Chronic | Status: ACTIVE | Noted: 2021-09-27

## 2025-02-11 PROBLEM — Z79.01 LONG TERM CURRENT USE OF ANTICOAGULANT THERAPY: Status: ACTIVE | Noted: 2023-06-14

## 2025-02-11 PROBLEM — I50.22 CHRONIC SYSTOLIC CONGESTIVE HEART FAILURE: Chronic | Status: ACTIVE | Noted: 2024-02-02

## 2025-02-11 PROBLEM — E11.36 TYPE 2 DIABETES MELLITUS WITH DIABETIC CATARACT: Chronic | Status: ACTIVE | Noted: 2024-12-29

## 2025-02-11 PROBLEM — Z79.01 LONG TERM CURRENT USE OF ANTICOAGULANT THERAPY: Chronic | Status: ACTIVE | Noted: 2023-06-14

## 2025-02-11 PROBLEM — Z74.09 IMPAIRED MOBILITY AND ACTIVITIES OF DAILY LIVING: Status: ACTIVE | Noted: 2023-04-12

## 2025-02-11 PROBLEM — E11.36 TYPE 2 DIABETES MELLITUS WITH DIABETIC CATARACT: Status: ACTIVE | Noted: 2024-12-29

## 2025-02-11 LAB
ALBUMIN SERPL BCP-MCNC: 3.6 G/DL (ref 3.5–5.2)
ALP SERPL-CCNC: 52 U/L (ref 40–150)
ALT SERPL W/O P-5'-P-CCNC: 26 U/L (ref 10–44)
ANION GAP SERPL CALC-SCNC: 7 MMOL/L (ref 8–16)
APTT PPP: 25 SEC (ref 21–32)
AST SERPL-CCNC: 29 U/L (ref 10–40)
AV INDEX (PROSTH): 0.79
AV MEAN GRADIENT: 3 MMHG
AV PEAK GRADIENT: 4 MMHG
AV VALVE AREA BY VELOCITY RATIO: 2.2 CM²
AV VALVE AREA: 2.5 CM²
AV VELOCITY RATIO: 0.7
BASOPHILS # BLD AUTO: 0.04 K/UL (ref 0–0.2)
BASOPHILS NFR BLD: 0.5 % (ref 0–1.9)
BILIRUB SERPL-MCNC: 0.5 MG/DL (ref 0.1–1)
BNP SERPL-MCNC: 114 PG/ML (ref 0–99)
BSA FOR ECHO PROCEDURE: 2.44 M2
BUN SERPL-MCNC: 16 MG/DL (ref 8–23)
CALCIUM SERPL-MCNC: 9.1 MG/DL (ref 8.7–10.5)
CHLORIDE SERPL-SCNC: 104 MMOL/L (ref 95–110)
CHOLEST SERPL-MCNC: 195 MG/DL (ref 120–199)
CHOLEST/HDLC SERPL: 5 {RATIO} (ref 2–5)
CO2 SERPL-SCNC: 27 MMOL/L (ref 23–29)
CREAT SERPL-MCNC: 0.9 MG/DL (ref 0.5–1.4)
CV ECHO LV RWT: 0.5 CM
DIFFERENTIAL METHOD BLD: NORMAL
DOP CALC AO PEAK VEL: 1 M/S
DOP CALC AO VTI: 16.3 CM
DOP CALC LVOT AREA: 3.1 CM2
DOP CALC LVOT DIAMETER: 2 CM
DOP CALC LVOT PEAK VEL: 0.7 M/S
DOP CALC LVOT STROKE VOLUME: 40.2 CM3
DOP CALCLVOT PEAK VEL VTI: 12.8 CM
E WAVE DECELERATION TIME: 270 MSEC
E/E' RATIO: 10 M/S
ECHO LV POSTERIOR WALL: 1 CM (ref 0.6–1.1)
EOSINOPHIL # BLD AUTO: 0.1 K/UL (ref 0–0.5)
EOSINOPHIL NFR BLD: 0.9 % (ref 0–8)
ERYTHROCYTE [DISTWIDTH] IN BLOOD BY AUTOMATED COUNT: 13.9 % (ref 11.5–14.5)
EST. GFR  (NO RACE VARIABLE): >60 ML/MIN/1.73 M^2
ESTIMATED AVG GLUCOSE: 146 MG/DL (ref 68–131)
FRACTIONAL SHORTENING: 22.5 % (ref 28–44)
GLUCOSE SERPL-MCNC: 122 MG/DL (ref 70–110)
HBA1C MFR BLD: 6.7 % (ref 4–5.6)
HCT VFR BLD AUTO: 40.2 % (ref 37–48.5)
HDLC SERPL-MCNC: 39 MG/DL (ref 40–75)
HDLC SERPL: 20 % (ref 20–50)
HGB BLD-MCNC: 12.9 G/DL (ref 12–16)
IMM GRANULOCYTES # BLD AUTO: 0.02 K/UL (ref 0–0.04)
IMM GRANULOCYTES NFR BLD AUTO: 0.3 % (ref 0–0.5)
INR PPP: 1 (ref 0.8–1.2)
INTERVENTRICULAR SEPTUM: 1.1 CM (ref 0.6–1.1)
IVC DIAMETER: 1.32 CM
LA MAJOR: 5.5 CM
LA MINOR: 5.9 CM
LA WIDTH: 3.9 CM
LDLC SERPL CALC-MCNC: 134.2 MG/DL (ref 63–159)
LEFT ATRIUM SIZE: 4.1 CM
LEFT ATRIUM VOLUME INDEX: 34 ML/M2
LEFT ATRIUM VOLUME: 77 CM3
LEFT INTERNAL DIMENSION IN SYSTOLE: 3.1 CM (ref 2.1–4)
LEFT VENTRICLE DIASTOLIC VOLUME INDEX: 31.48 ML/M2
LEFT VENTRICLE DIASTOLIC VOLUME: 71.47 ML
LEFT VENTRICLE MASS INDEX: 60 G/M2
LEFT VENTRICLE SYSTOLIC VOLUME INDEX: 16.2 ML/M2
LEFT VENTRICLE SYSTOLIC VOLUME: 36.68 ML
LEFT VENTRICULAR INTERNAL DIMENSION IN DIASTOLE: 4 CM (ref 3.5–6)
LEFT VENTRICULAR MASS: 136.2 G
LV LATERAL E/E' RATIO: 10.2 M/S
LV SEPTAL E/E' RATIO: 10.2 M/S
LVED V (TEICH): 71.47 ML
LVES V (TEICH): 36.68 ML
LVOT MG: 1.08 MMHG
LVOT MV: 0.5 CM/S
LYMPHOCYTES # BLD AUTO: 2 K/UL (ref 1–4.8)
LYMPHOCYTES NFR BLD: 25.4 % (ref 18–48)
MAGNESIUM SERPL-MCNC: 1.8 MG/DL (ref 1.6–2.6)
MCH RBC QN AUTO: 28.2 PG (ref 27–31)
MCHC RBC AUTO-ENTMCNC: 32.1 G/DL (ref 32–36)
MCV RBC AUTO: 88 FL (ref 82–98)
MONOCYTES # BLD AUTO: 0.5 K/UL (ref 0.3–1)
MONOCYTES NFR BLD: 6.1 % (ref 4–15)
MV PEAK E VEL: 0.92 M/S
MV STENOSIS PRESSURE HALF TIME: 78.24 MS
MV VALVE AREA P 1/2 METHOD: 2.81 CM2
NEUTROPHILS # BLD AUTO: 5.1 K/UL (ref 1.8–7.7)
NEUTROPHILS NFR BLD: 66.8 % (ref 38–73)
NONHDLC SERPL-MCNC: 156 MG/DL
NRBC BLD-RTO: 0 /100 WBC
OHS CV RV/LV RATIO: 0.98 CM
PISA TR MAX VEL: 2.3 M/S
PLATELET # BLD AUTO: 234 K/UL (ref 150–450)
PMV BLD AUTO: 10.8 FL (ref 9.2–12.9)
POCT GLUCOSE: 107 MG/DL (ref 70–110)
POCT GLUCOSE: 91 MG/DL (ref 70–110)
POTASSIUM SERPL-SCNC: 3.9 MMOL/L (ref 3.5–5.1)
PROT SERPL-MCNC: 6.8 G/DL (ref 6–8.4)
PROTHROMBIN TIME: 10.8 SEC (ref 9–12.5)
PV PEAK GRADIENT: 2 MMHG
PV PEAK VELOCITY: 0.68 M/S
RA MAJOR: 5.31 CM
RA PRESSURE ESTIMATED: 3 MMHG
RA WIDTH: 4.1 CM
RBC # BLD AUTO: 4.57 M/UL (ref 4–5.4)
RIGHT VENTRICLE DIASTOLIC BASEL DIMENSION: 3.9 CM
RIGHT VENTRICULAR END-DIASTOLIC DIMENSION: 3.9 CM
RV TB RVSP: 5 MMHG
RV TISSUE DOPPLER FREE WALL SYSTOLIC VELOCITY 1 (APICAL 4 CHAMBER VIEW): 11.28 CM/S
SINUS: 3.26 CM
SODIUM SERPL-SCNC: 138 MMOL/L (ref 136–145)
STJ: 2.97 CM
T4 FREE SERPL-MCNC: 0.94 NG/DL (ref 0.71–1.51)
TDI LATERAL: 0.09 M/S
TDI SEPTAL: 0.09 M/S
TDI: 0.09 M/S
TR MAX PG: 21 MMHG
TRICUSPID ANNULAR PLANE SYSTOLIC EXCURSION: 1.4 CM
TRIGL SERPL-MCNC: 109 MG/DL (ref 30–150)
TROPONIN I SERPL DL<=0.01 NG/ML-MCNC: 0.01 NG/ML (ref 0–0.03)
TROPONIN I SERPL DL<=0.01 NG/ML-MCNC: <0.006 NG/ML (ref 0–0.03)
TSH SERPL DL<=0.005 MIU/L-ACNC: 4.88 UIU/ML (ref 0.4–4)
TV REST PULMONARY ARTERY PRESSURE: 24 MMHG
WBC # BLD AUTO: 7.67 K/UL (ref 3.9–12.7)
Z-SCORE OF LEFT VENTRICULAR DIMENSION IN END DIASTOLE: -7.46
Z-SCORE OF LEFT VENTRICULAR DIMENSION IN END SYSTOLE: -3.94

## 2025-02-11 PROCEDURE — 85025 COMPLETE CBC W/AUTO DIFF WBC: CPT | Performed by: PHYSICIAN ASSISTANT

## 2025-02-11 PROCEDURE — 80061 LIPID PANEL: CPT | Performed by: INTERNAL MEDICINE

## 2025-02-11 PROCEDURE — G0378 HOSPITAL OBSERVATION PER HR: HCPCS

## 2025-02-11 PROCEDURE — 84439 ASSAY OF FREE THYROXINE: CPT | Performed by: PHYSICIAN ASSISTANT

## 2025-02-11 PROCEDURE — 85610 PROTHROMBIN TIME: CPT | Performed by: PHYSICIAN ASSISTANT

## 2025-02-11 PROCEDURE — 80053 COMPREHEN METABOLIC PANEL: CPT | Performed by: PHYSICIAN ASSISTANT

## 2025-02-11 PROCEDURE — 93005 ELECTROCARDIOGRAM TRACING: CPT

## 2025-02-11 PROCEDURE — 83735 ASSAY OF MAGNESIUM: CPT | Performed by: PHYSICIAN ASSISTANT

## 2025-02-11 PROCEDURE — 85730 THROMBOPLASTIN TIME PARTIAL: CPT | Performed by: PHYSICIAN ASSISTANT

## 2025-02-11 PROCEDURE — 25000003 PHARM REV CODE 250: Performed by: INTERNAL MEDICINE

## 2025-02-11 PROCEDURE — 99285 EMERGENCY DEPT VISIT HI MDM: CPT | Mod: 25

## 2025-02-11 PROCEDURE — 84484 ASSAY OF TROPONIN QUANT: CPT | Mod: 91 | Performed by: INTERNAL MEDICINE

## 2025-02-11 PROCEDURE — 84484 ASSAY OF TROPONIN QUANT: CPT | Mod: 91 | Performed by: PHYSICIAN ASSISTANT

## 2025-02-11 PROCEDURE — 84484 ASSAY OF TROPONIN QUANT: CPT | Mod: 91 | Performed by: STUDENT IN AN ORGANIZED HEALTH CARE EDUCATION/TRAINING PROGRAM

## 2025-02-11 PROCEDURE — 84443 ASSAY THYROID STIM HORMONE: CPT | Performed by: PHYSICIAN ASSISTANT

## 2025-02-11 PROCEDURE — 63600175 PHARM REV CODE 636 W HCPCS: Performed by: STUDENT IN AN ORGANIZED HEALTH CARE EDUCATION/TRAINING PROGRAM

## 2025-02-11 PROCEDURE — 83036 HEMOGLOBIN GLYCOSYLATED A1C: CPT | Performed by: INTERNAL MEDICINE

## 2025-02-11 PROCEDURE — 25000003 PHARM REV CODE 250: Performed by: STUDENT IN AN ORGANIZED HEALTH CARE EDUCATION/TRAINING PROGRAM

## 2025-02-11 PROCEDURE — 83880 ASSAY OF NATRIURETIC PEPTIDE: CPT | Performed by: PHYSICIAN ASSISTANT

## 2025-02-11 PROCEDURE — 93010 ELECTROCARDIOGRAM REPORT: CPT | Mod: ,,, | Performed by: INTERNAL MEDICINE

## 2025-02-11 PROCEDURE — 96374 THER/PROPH/DIAG INJ IV PUSH: CPT

## 2025-02-11 PROCEDURE — 63600175 PHARM REV CODE 636 W HCPCS: Performed by: INTERNAL MEDICINE

## 2025-02-11 PROCEDURE — 93010 ELECTROCARDIOGRAM REPORT: CPT | Mod: 76,,, | Performed by: INTERNAL MEDICINE

## 2025-02-11 RX ORDER — ORPHENADRINE CITRATE 30 MG/ML
60 INJECTION INTRAMUSCULAR; INTRAVENOUS
Status: COMPLETED | OUTPATIENT
Start: 2025-02-11 | End: 2025-02-11

## 2025-02-11 RX ORDER — HYDROCHLOROTHIAZIDE 12.5 MG/1
12.5 TABLET ORAL DAILY
Status: DISCONTINUED | OUTPATIENT
Start: 2025-02-11 | End: 2025-02-12 | Stop reason: HOSPADM

## 2025-02-11 RX ORDER — NAPROXEN SODIUM 220 MG/1
1 TABLET, FILM COATED ORAL DAILY
COMMUNITY
Start: 2025-01-24 | End: 2026-01-24

## 2025-02-11 RX ORDER — ONDANSETRON HYDROCHLORIDE 2 MG/ML
4 INJECTION, SOLUTION INTRAVENOUS EVERY 6 HOURS PRN
Status: DISCONTINUED | OUTPATIENT
Start: 2025-02-11 | End: 2025-02-12 | Stop reason: HOSPADM

## 2025-02-11 RX ORDER — METOPROLOL SUCCINATE 50 MG/1
100 TABLET, EXTENDED RELEASE ORAL DAILY
Status: DISCONTINUED | OUTPATIENT
Start: 2025-02-11 | End: 2025-02-12 | Stop reason: HOSPADM

## 2025-02-11 RX ORDER — PANTOPRAZOLE SODIUM 40 MG/1
40 TABLET, DELAYED RELEASE ORAL DAILY
Status: DISCONTINUED | OUTPATIENT
Start: 2025-02-12 | End: 2025-02-12 | Stop reason: HOSPADM

## 2025-02-11 RX ORDER — TALC
6 POWDER (GRAM) TOPICAL NIGHTLY PRN
Status: DISCONTINUED | OUTPATIENT
Start: 2025-02-11 | End: 2025-02-12 | Stop reason: HOSPADM

## 2025-02-11 RX ORDER — NITROGLYCERIN 0.4 MG/1
0.4 TABLET SUBLINGUAL EVERY 5 MIN PRN
Status: DISCONTINUED | OUTPATIENT
Start: 2025-02-11 | End: 2025-02-12 | Stop reason: HOSPADM

## 2025-02-11 RX ORDER — TRAMADOL HYDROCHLORIDE 50 MG/1
50 TABLET ORAL EVERY 6 HOURS PRN
Status: DISCONTINUED | OUTPATIENT
Start: 2025-02-11 | End: 2025-02-12 | Stop reason: HOSPADM

## 2025-02-11 RX ORDER — HYDRALAZINE HYDROCHLORIDE 20 MG/ML
5 INJECTION INTRAMUSCULAR; INTRAVENOUS EVERY 6 HOURS PRN
Status: DISCONTINUED | OUTPATIENT
Start: 2025-02-11 | End: 2025-02-12 | Stop reason: HOSPADM

## 2025-02-11 RX ORDER — IBUPROFEN 200 MG
16 TABLET ORAL
Status: DISCONTINUED | OUTPATIENT
Start: 2025-02-11 | End: 2025-02-12 | Stop reason: HOSPADM

## 2025-02-11 RX ORDER — LOSARTAN POTASSIUM 25 MG/1
100 TABLET ORAL DAILY
Status: DISCONTINUED | OUTPATIENT
Start: 2025-02-11 | End: 2025-02-12 | Stop reason: HOSPADM

## 2025-02-11 RX ORDER — ACETAMINOPHEN 325 MG/1
650 TABLET ORAL EVERY 6 HOURS PRN
Status: DISCONTINUED | OUTPATIENT
Start: 2025-02-11 | End: 2025-02-12 | Stop reason: HOSPADM

## 2025-02-11 RX ORDER — FUROSEMIDE 10 MG/ML
40 INJECTION INTRAMUSCULAR; INTRAVENOUS
Status: COMPLETED | OUTPATIENT
Start: 2025-02-11 | End: 2025-02-11

## 2025-02-11 RX ORDER — GABAPENTIN 300 MG/1
300 CAPSULE ORAL 3 TIMES DAILY
Status: DISCONTINUED | OUTPATIENT
Start: 2025-02-11 | End: 2025-02-12 | Stop reason: HOSPADM

## 2025-02-11 RX ORDER — POLYETHYLENE GLYCOL 3350 17 G/17G
17 POWDER, FOR SOLUTION ORAL 2 TIMES DAILY PRN
Status: DISCONTINUED | OUTPATIENT
Start: 2025-02-11 | End: 2025-02-12 | Stop reason: HOSPADM

## 2025-02-11 RX ORDER — ASPIRIN 325 MG
325 TABLET, DELAYED RELEASE (ENTERIC COATED) ORAL
Status: COMPLETED | OUTPATIENT
Start: 2025-02-11 | End: 2025-02-11

## 2025-02-11 RX ORDER — PANTOPRAZOLE SODIUM 40 MG/10ML
40 INJECTION, POWDER, LYOPHILIZED, FOR SOLUTION INTRAVENOUS ONCE
Status: COMPLETED | OUTPATIENT
Start: 2025-02-11 | End: 2025-02-11

## 2025-02-11 RX ORDER — ATORVASTATIN CALCIUM 40 MG/1
1 TABLET, FILM COATED ORAL NIGHTLY
COMMUNITY
Start: 2024-10-31

## 2025-02-11 RX ORDER — GLUCAGON 1 MG
1 KIT INJECTION
Status: DISCONTINUED | OUTPATIENT
Start: 2025-02-11 | End: 2025-02-12 | Stop reason: HOSPADM

## 2025-02-11 RX ORDER — HYDROCODONE BITARTRATE AND ACETAMINOPHEN 5; 325 MG/1; MG/1
1 TABLET ORAL EVERY 6 HOURS PRN
Status: DISCONTINUED | OUTPATIENT
Start: 2025-02-11 | End: 2025-02-12 | Stop reason: HOSPADM

## 2025-02-11 RX ORDER — IBUPROFEN 200 MG
24 TABLET ORAL
Status: DISCONTINUED | OUTPATIENT
Start: 2025-02-11 | End: 2025-02-12 | Stop reason: HOSPADM

## 2025-02-11 RX ORDER — INSULIN ASPART 100 [IU]/ML
0-10 INJECTION, SOLUTION INTRAVENOUS; SUBCUTANEOUS
Status: DISCONTINUED | OUTPATIENT
Start: 2025-02-11 | End: 2025-02-12 | Stop reason: HOSPADM

## 2025-02-11 RX ORDER — ASPIRIN 81 MG/1
81 TABLET ORAL DAILY
Status: DISCONTINUED | OUTPATIENT
Start: 2025-02-12 | End: 2025-02-12 | Stop reason: HOSPADM

## 2025-02-11 RX ADMIN — APIXABAN 5 MG: 5 TABLET, FILM COATED ORAL at 09:02

## 2025-02-11 RX ADMIN — LOSARTAN POTASSIUM 100 MG: 25 TABLET, FILM COATED ORAL at 09:02

## 2025-02-11 RX ADMIN — ASPIRIN 325 MG: 325 TABLET, COATED ORAL at 03:02

## 2025-02-11 RX ADMIN — PANTOPRAZOLE SODIUM 40 MG: 40 INJECTION, POWDER, LYOPHILIZED, FOR SOLUTION INTRAVENOUS at 05:02

## 2025-02-11 RX ADMIN — GABAPENTIN 300 MG: 300 CAPSULE ORAL at 09:02

## 2025-02-11 RX ADMIN — FUROSEMIDE 40 MG: 10 INJECTION, SOLUTION INTRAMUSCULAR; INTRAVENOUS at 05:02

## 2025-02-11 RX ADMIN — ORPHENADRINE CITRATE 60 MG: 60 INJECTION INTRAMUSCULAR; INTRAVENOUS at 03:02

## 2025-02-11 RX ADMIN — METOPROLOL SUCCINATE 100 MG: 50 TABLET, EXTENDED RELEASE ORAL at 09:02

## 2025-02-11 RX ADMIN — GABAPENTIN 300 MG: 300 CAPSULE ORAL at 05:02

## 2025-02-11 RX ADMIN — HYDROCHLOROTHIAZIDE 12.5 MG: 12.5 TABLET ORAL at 09:02

## 2025-02-11 NOTE — H&P
[facility] [place] Emergency Dept  [facility] Medicine  History & Physical    Patient Name: Amy Schreiber  MRN: [id number]  Patient Class: OP- Observation  Admission Date: 2/11/2025  Attending Physician: Terry Ruano, *   Primary Care Provider: Kurt Mcdaniels MD (Inactive)         Patient information was obtained from patient, past medical records, and ER records.     Subjective:     Principal Problem:Chest pain    Chief Complaint:   Chief Complaint   Patient presents with    Chest Pain     Pt c/o midsternal chest pain, generalized weakness, & leg pains starting a few days ago; hx of CHF, A Flutter, and PVD per medical records        HPI: 84 y.o. female with longstanding persistent atrial fibrillation, COPD, chronic systolic heart failure dyslipidemia hypertension, hip fracture, impaired mobility, depression, obesity, sciatica, peripheral vascular disease, and DM 2 presents with a complaint of chest pain.  Pain is acute onset, located mid chest, does not radiate, duration several days.  Currently resolved.  She additionally complains of chronic hip and back pain as well as a foot specialist telling her that she needs to get the circulation in her legs evaluated.  Denies fever, chills, cough, dizziness, syncope, nausea, vomiting.  In the ED, EKG without evidence of acute ischemia, initial troponin negative.  TSH elevated with normal free T4.  Otherwise unremarkable for acute abnormality.  Placed in observation for ACS rule out.    Past Medical History:   Diagnosis Date    Arthritis     Chronic back pain     High cholesterol     Hypertension     Spinal stenosis     Thyroid disease        Past Surgical History:   Procedure Laterality Date    EYE SURGERY      TREATMENT OF CARDIAC ARRHYTHMIA N/A 3/10/2022    Procedure: Cardioversion or Defibrillation;  Surgeon: David Dillon MD;  Location: [facility] CATH LAB;  Service: Cardiology;  Laterality: N/A;    TUBAL LIGATION      Uterine suspension         Review of  patient's allergies indicates:  No Known Allergies    No current facility-administered medications on file prior to encounter.     Current Outpatient Medications on File Prior to Encounter   Medication Sig    apixaban (ELIQUIS) 5 mg Tab Take 5 mg by mouth 2 (two) times daily.    aspirin 81 MG Chew Take 1 tablet by mouth once daily.    atorvastatin (LIPITOR) 40 MG tablet Take 1 tablet by mouth every evening.    gabapentin (NEURONTIN) 300 MG capsule Take 300 mg by mouth 3 (three) times daily.    losartan-hydrochlorothiazide 100-25 mg (HYZAAR) 100-25 mg per tablet Take 1 tablet by mouth once daily.    metoprolol succinate (TOPROL-XL) 100 MG 24 hr tablet Take 1 tablet (100 mg total) by mouth once daily.    [DISCONTINUED] albuterol (PROVENTIL/VENTOLIN HFA) 90 mcg/actuation inhaler Inhale 1-2 puffs into the lungs every 6 (six) hours as needed for Wheezing or Shortness of Breath (or excessive cough). Rescue    [DISCONTINUED] azithromycin (Z-GRACIE) 250 MG tablet Take 1 tablet (250 mg total) by mouth once daily. Take first 2 tablets together, then 1 every day until finished.    [DISCONTINUED] diclofenac sodium (VOLTAREN) 1 % Gel Apply 2 g topically 4 (four) times daily as needed (Pain).    [DISCONTINUED] loratadine (CLARITIN) 10 mg tablet Take 1 tablet (10 mg total) by mouth once daily.    [DISCONTINUED] losartan-hydrochlorothiazide 100-12.5 mg (HYZAAR) 100-12.5 mg Tab Take 1 tablet by mouth once daily.    [DISCONTINUED] meloxicam (MOBIC) 15 MG tablet Take 1 tablet (15 mg total) by mouth daily as needed for Pain.    [DISCONTINUED] omeprazole (PRILOSEC) 40 MG capsule Take 1 capsule (40 mg total) by mouth once daily.    [DISCONTINUED] traMADoL (ULTRAM) 50 mg tablet Take 1 tablet (50 mg total) by mouth every 6 (six) hours as needed for Pain.     Family History    None       Tobacco Use    Smoking status: Never    Smokeless tobacco: Never   Substance and Sexual Activity    Alcohol use: Never    Drug use: Never    Sexual activity:  Not Currently     Review of Systems   Constitutional:  Negative for chills, fatigue and fever.   HENT:  Negative for congestion and rhinorrhea.    Eyes:  Negative for photophobia and visual disturbance.   Respiratory:  Negative for cough and shortness of breath.    Cardiovascular:  Positive for chest pain. Negative for palpitations and leg swelling.   Gastrointestinal:  Negative for abdominal pain, diarrhea, nausea and vomiting.   Genitourinary:  Negative for dysuria, frequency and urgency.   Musculoskeletal:  Positive for arthralgias and back pain.   Skin:  Negative for pallor, rash and wound.   Neurological:  Negative for light-headedness and headaches.   Psychiatric/Behavioral:  Negative for confusion and decreased concentration.      Objective:     Vital Signs (Most Recent):  Temp: 98.2 °F (36.8 °C) (02/11/25 1148)  Pulse: 83 (02/11/25 1200)  Resp: (!) 22 (02/11/25 1200)  BP: 136/70 (02/11/25 1148)  SpO2: 97 % (02/11/25 1200) Vital Signs (24h Range):  Temp:  [98 °F (36.7 °C)-98.2 °F (36.8 °C)] 98.2 °F (36.8 °C)  Pulse:  [75-90] 83  Resp:  [19-28] 22  SpO2:  [95 %-99 %] 97 %  BP: (105-178)/() 136/70     Weight: 136.1 kg (300 lb)  Body mass index is 54.87 kg/m².     Physical Exam  Vitals and nursing note reviewed.   Constitutional:       General: She is not in acute distress.     Appearance: She is well-developed.   HENT:      Head: Normocephalic and atraumatic.      Right Ear: External ear normal.      Left Ear: External ear normal.   Cardiovascular:      Rate and Rhythm: Normal rate and regular rhythm.   Pulmonary:      Effort: Pulmonary effort is normal. No respiratory distress.   Abdominal:      General: There is no distension.      Palpations: Abdomen is soft.   Skin:     General: Skin is warm and dry.   Neurological:      Mental Status: She is alert and oriented to person, place, and time.   Psychiatric:         Thought Content: Thought content normal.                Significant Labs: All pertinent  "labs within the past 24 hours have been reviewed.    Significant Imaging: I have reviewed all pertinent imaging results/findings within the past 24 hours.  Assessment/Plan:     * Chest pain  Reported chest pain for several days, EKG without evidence of acute ischemia and initial troponin negative.  Currently pain free.  Monitor on tele, obtain serial troponin, check echo, NPO.  Cardiology consult, appreciate recs.    Type 2 diabetes mellitus with diabetic cataract  Patient's FSGs are controlled on current medication regimen.  Last A1c reviewed-   Lab Results   Component Value Date    HGBA1C 6.3 (H) 06/14/2017     Most recent fingerstick glucose reviewed- No results for input(s): "POCTGLUCOSE" in the last 24 hours.  Current correctional scale  Medium  Maintain anti-hyperglycemic dose as follows-   Antihyperglycemics (From admission, onward)      Start     Stop Route Frequency Ordered    02/11/25 1404  insulin aspart U-100 pen 0-10 Units         -- SubQ Before meals & nightly PRN 02/11/25 1304          Hold Oral hypoglycemics while patient is in the hospital.    Sciatica of right side  Chronic, continue as needed analgesics    Peripheral vascular disease  Chronic, check ultrasound    Longstanding persistent atrial fibrillation  Patient has long standing persistent (>12 months) atrial fibrillation. Patient is currently in atrial fibrillation. [id number] Score: 3. The patients heart rate in the last 24 hours is as follows:  Pulse  Min: 75  Max: 90     Antiarrhythmics  metoprolol succinate (TOPROL-XL) 24 hr tablet 100 mg, Daily, Oral    Anticoagulants  apixaban tablet 5 mg, 2 times daily, Oral    Plan  - Replete lytes with a goal of K>4, Mg >2  - Patient is anticoagulated, see medications listed above.  - Patient's afib is currently controlled    Fracture of hip  Chronic, as needed analgesics    Chronic systolic congestive heart failure  Patient has Systolic (HFrEF) heart failure that is Chronic. On presentation their " CHF was well compensated. Most recent BNP and echo results are listed below.  Recent Labs     02/11/25  0149   *     Latest ECHO  Results for orders placed during the hospital encounter of 03/09/22    Echo    Interpretation Summary  · The left ventricle is normal in size with mild concentric hypertrophy and low normal systolic function.  · The estimated ejection fraction is 50%.  · Mild right ventricular enlargement with low normal right ventricular systolic function.  · The estimated PA systolic pressure is 18 mmHg.  · Atrial flutter observed.    Current Heart Failure Medications  losartan tablet 100 mg, Daily, Oral  hydroCHLOROthiazide tablet 12.5 mg, Daily, Oral  metoprolol succinate (TOPROL-XL) 24 hr tablet 100 mg, Daily, Oral  hydrALAZINE injection 5 mg, Every 6 hours PRN, Intravenous    Plan  - Monitor strict I&Os and daily weights.    - Place on telemetry  - Low sodium diet  - Place on fluid restriction of 2 L.   - Cardiology has been consulted  - The patient's volume status is at their baseline    Chronic obstructive pulmonary disease, unspecified  Well-controlled, p.r.n. nebs.    Major depressive disorder, recurrent, unspecified  Patient has recurrent depression which is moderate and is currently controlled. Will Continue anti-depressant medications. We will not consult psychiatry at this time. Patient does not display psychosis at this time. Continue to monitor closely and adjust plan of care as needed.    Dyslipidemia  Continue statin    Hypertension, benign  Patient's blood pressure range in the last 24 hours was: BP  Min: 105/81  Max: 178/91.The patient's inpatient anti-hypertensive regimen is listed below:  Current Antihypertensives  losartan tablet 100 mg, Daily, Oral  hydroCHLOROthiazide tablet 12.5 mg, Daily, Oral  metoprolol succinate (TOPROL-XL) 24 hr tablet 100 mg, Daily, Oral  hydrALAZINE injection 5 mg, Every 6 hours PRN, Intravenous  nitroGLYCERIN SL tablet 0.4 mg, Every 5 min PRN,  Sublingual    Plan  - BP is controlled, no changes needed to their regimen      VTE Risk Mitigation (From admission, onward)           Ordered     apixaban tablet 5 mg  2 times daily         02/11/25 0500                         On 02/11/2025, patient should be placed in hospital observation services under my care in collaboration with Terry Ruano MD.    Reese White Jr., APRN, AGABellevue [facility]-BC  Hospitalist - Department of Hospital Medicine  [facility] [address] [place] [address]  Office #: [phone]; Pager #: [phone]

## 2025-02-11 NOTE — PLAN OF CARE
[facility] [place] Emergency Dept  Discharge Assessment    Primary Care Provider: Pedrito Cunningham MD   Case Management Assessment     PCP: See above (Per Chart Review)  Pharmacy: ??    Patient Arrived From: home with daughter  Existing Help at Home: daughter    Barriers to Discharge: ?    Discharge Plan:    A. Home with daughter   B. home with family      Discharge planning assessment began with SW speaking with patient via language line  (ID# [id number]).  Patient stated that she lives with her daughter.  She was certain of the address, but lives with daughter Shanique March.    Patient stated that she needs help during the day because her daughter works.  SW to explore resources for assistance during the day, e.g., [facility] on Aging.    SW attempted to contact patient's daughter, Shanique March at [phone] but call went to voice mail.  Language Line  ID# [id number] Ping.  Voice message left requesting return call to SW at [phone].       Discharge Assessment (most recent)       BRIEF DISCHARGE ASSESSMENT - 02/11/25 1256          Discharge Planning    Assessment Type Discharge Planning Brief Assessment     Resource/Environmental Concerns other (see comments)   Wants resources for help at home because her daughter works during the day.    Support Systems Children     Assistance Needed Patient lives with daughter and requesting help because daughter works during the day.     Current Living Arrangements apartment     Care Facility Name n/a     Patient/Family Anticipates Transition to home with family     Patient/Family Anticipated Services at Transition outpatient care     DME Needed Upon Discharge  none     Discharge Plan A Home with family     Discharge Plan B Home

## 2025-02-11 NOTE — SUBJECTIVE & OBJECTIVE
Past Medical History:   Diagnosis Date    Arthritis     Chronic back pain     High cholesterol     Hypertension     Spinal stenosis     Thyroid disease        Past Surgical History:   Procedure Laterality Date    EYE SURGERY      TREATMENT OF CARDIAC ARRHYTHMIA N/A 3/10/2022    Procedure: Cardioversion or Defibrillation;  Surgeon: David Dillon MD;  Location: [facility] CATH LAB;  Service: Cardiology;  Laterality: N/A;    TUBAL LIGATION      Uterine suspension         Review of patient's allergies indicates:  No Known Allergies    No current facility-administered medications on file prior to encounter.     Current Outpatient Medications on File Prior to Encounter   Medication Sig    apixaban (ELIQUIS) 5 mg Tab Take 5 mg by mouth 2 (two) times daily.    aspirin 81 MG Chew Take 1 tablet by mouth once daily.    atorvastatin (LIPITOR) 40 MG tablet Take 1 tablet by mouth every evening.    gabapentin (NEURONTIN) 300 MG capsule Take 300 mg by mouth 3 (three) times daily.    losartan-hydrochlorothiazide 100-25 mg (HYZAAR) 100-25 mg per tablet Take 1 tablet by mouth once daily.    metoprolol succinate (TOPROL-XL) 100 MG 24 hr tablet Take 1 tablet (100 mg total) by mouth once daily.    [DISCONTINUED] albuterol (PROVENTIL/VENTOLIN HFA) 90 mcg/actuation inhaler Inhale 1-2 puffs into the lungs every 6 (six) hours as needed for Wheezing or Shortness of Breath (or excessive cough). Rescue    [DISCONTINUED] azithromycin (Z-GRACIE) 250 MG tablet Take 1 tablet (250 mg total) by mouth once daily. Take first 2 tablets together, then 1 every day until finished.    [DISCONTINUED] diclofenac sodium (VOLTAREN) 1 % Gel Apply 2 g topically 4 (four) times daily as needed (Pain).    [DISCONTINUED] loratadine (CLARITIN) 10 mg tablet Take 1 tablet (10 mg total) by mouth once daily.    [DISCONTINUED] losartan-hydrochlorothiazide 100-12.5 mg (HYZAAR) 100-12.5 mg Tab Take 1 tablet by mouth once daily.    [DISCONTINUED] meloxicam (MOBIC) 15 MG tablet  Take 1 tablet (15 mg total) by mouth daily as needed for Pain.    [DISCONTINUED] omeprazole (PRILOSEC) 40 MG capsule Take 1 capsule (40 mg total) by mouth once daily.    [DISCONTINUED] traMADoL (ULTRAM) 50 mg tablet Take 1 tablet (50 mg total) by mouth every 6 (six) hours as needed for Pain.     Family History    None       Tobacco Use    Smoking status: Never    Smokeless tobacco: Never   Substance and Sexual Activity    Alcohol use: Never    Drug use: Never    Sexual activity: Not Currently     Review of Systems   Constitutional:  Negative for chills, fatigue and fever.   HENT:  Negative for congestion and rhinorrhea.    Eyes:  Negative for photophobia and visual disturbance.   Respiratory:  Negative for cough and shortness of breath.    Cardiovascular:  Positive for chest pain. Negative for palpitations and leg swelling.   Gastrointestinal:  Negative for abdominal pain, diarrhea, nausea and vomiting.   Genitourinary:  Negative for dysuria, frequency and urgency.   Musculoskeletal:  Positive for arthralgias and back pain.   Skin:  Negative for pallor, rash and wound.   Neurological:  Negative for light-headedness and headaches.   Psychiatric/Behavioral:  Negative for confusion and decreased concentration.      Objective:     Vital Signs (Most Recent):  Temp: 98.2 °F (36.8 °C) (02/11/25 1148)  Pulse: 83 (02/11/25 1200)  Resp: (!) 22 (02/11/25 1200)  BP: 136/70 (02/11/25 1148)  SpO2: 97 % (02/11/25 1200) Vital Signs (24h Range):  Temp:  [98 °F (36.7 °C)-98.2 °F (36.8 °C)] 98.2 °F (36.8 °C)  Pulse:  [75-90] 83  Resp:  [19-28] 22  SpO2:  [95 %-99 %] 97 %  BP: (105-178)/() 136/70     Weight: 136.1 kg (300 lb)  Body mass index is 54.87 kg/m².     Physical Exam  Vitals and nursing note reviewed.   Constitutional:       General: She is not in acute distress.     Appearance: She is well-developed.   HENT:      Head: Normocephalic and atraumatic.      Right Ear: External ear normal.      Left Ear: External ear normal.    Cardiovascular:      Rate and Rhythm: Normal rate and regular rhythm.   Pulmonary:      Effort: Pulmonary effort is normal. No respiratory distress.   Abdominal:      General: There is no distension.      Palpations: Abdomen is soft.   Skin:     General: Skin is warm and dry.   Neurological:      Mental Status: She is alert and oriented to person, place, and time.   Psychiatric:         Thought Content: Thought content normal.                Significant Labs: All pertinent labs within the past 24 hours have been reviewed.    Significant Imaging: I have reviewed all pertinent imaging results/findings within the past 24 hours.

## 2025-02-11 NOTE — ASSESSMENT & PLAN NOTE
Patient has long standing persistent (>12 months) atrial fibrillation. Patient is currently in atrial fibrillation. [id number] Score: 3. The patients heart rate in the last 24 hours is as follows:  Pulse  Min: 75  Max: 90     Antiarrhythmics  metoprolol succinate (TOPROL-XL) 24 hr tablet 100 mg, Daily, Oral    Anticoagulants  apixaban tablet 5 mg, 2 times daily, Oral    Plan  - Replete lytes with a goal of K>4, Mg >2  - Patient is anticoagulated, see medications listed above.  - Patient's afib is currently controlled

## 2025-02-11 NOTE — ASSESSMENT & PLAN NOTE
Patient has Systolic (HFrEF) heart failure that is Chronic. On presentation their CHF was well compensated. Most recent BNP and echo results are listed below.  Recent Labs     02/11/25  0149   *     Latest ECHO  Results for orders placed during the hospital encounter of 03/09/22    Echo    Interpretation Summary  · The left ventricle is normal in size with mild concentric hypertrophy and low normal systolic function.  · The estimated ejection fraction is 50%.  · Mild right ventricular enlargement with low normal right ventricular systolic function.  · The estimated PA systolic pressure is 18 mmHg.  · Atrial flutter observed.    Current Heart Failure Medications  losartan tablet 100 mg, Daily, Oral  hydroCHLOROthiazide tablet 12.5 mg, Daily, Oral  metoprolol succinate (TOPROL-XL) 24 hr tablet 100 mg, Daily, Oral  hydrALAZINE injection 5 mg, Every 6 hours PRN, Intravenous    Plan  - Monitor strict I&Os and daily weights.    - Place on telemetry  - Low sodium diet  - Place on fluid restriction of 2 L.   - Cardiology has been consulted  - The patient's volume status is at their baseline

## 2025-02-11 NOTE — CONSULTS
Food & Nutrition  Education     Diet Education: Cardiac Diet Education  Time Spent: -  Learners: -        Nutrition Education provided with handouts (in English): Heart Healthy Diet, Diet and Health        Comments: Consult received for heart healthy diet education. Unable to educate pt at this time, pt in ED. RD to follow-up at a later date to educate pt when moved to floor. Educational handouts attached to d/c paperwork.            Follow-Up: 2/12     Please Re-consult as needed           Thanks!

## 2025-02-11 NOTE — ASSESSMENT & PLAN NOTE
Patient's blood pressure range in the last 24 hours was: BP  Min: 105/81  Max: 178/91.The patient's inpatient anti-hypertensive regimen is listed below:  Current Antihypertensives  losartan tablet 100 mg, Daily, Oral  hydroCHLOROthiazide tablet 12.5 mg, Daily, Oral  metoprolol succinate (TOPROL-XL) 24 hr tablet 100 mg, Daily, Oral  hydrALAZINE injection 5 mg, Every 6 hours PRN, Intravenous  nitroGLYCERIN SL tablet 0.4 mg, Every 5 min PRN, Sublingual    Plan  - BP is controlled, no changes needed to their regimen

## 2025-02-11 NOTE — ED PROVIDER NOTES
Encounter Date: 2/11/2025       History     Chief Complaint   Patient presents with    Chest Pain     Pt c/o midsternal chest pain, generalized weakness, & leg pains starting a few days ago; hx of CHF, A Flutter, and PVD per medical records     HPI    84-year-old female with a past medical history of high cholesterol, HTN, presents to the emergency department for evaluation of left-sided chest pain radiating towards left and right arm.  She notes generalized weakness and myalgias starting a few days ago.  She noted palpitations earlier today.  She notes chronic bilateral leg pains and has a history of peripheral vascular disease.  She denies nausea, vomiting, abdominal pain, syncope, cough, shortness for breath,, dysuria, hematuria.  She notes chronic neck pain as well.  Patient complaining of chronic hip pain but denies new trauma.  She notes mild sore throat as well.  Denies sick contacts.  She denies fever, chills.  No other mitigating or exacerbating factors.  Review of patient's allergies indicates:  No Known Allergies  Past Medical History:   Diagnosis Date    Arthritis     Chronic back pain     High cholesterol     Hypertension     Spinal stenosis     Thyroid disease      Past Surgical History:   Procedure Laterality Date    EYE SURGERY      TREATMENT OF CARDIAC ARRHYTHMIA N/A 3/10/2022    Procedure: Cardioversion or Defibrillation;  Surgeon: David Dillon MD;  Location: [facility] CATH LAB;  Service: Cardiology;  Laterality: N/A;    TUBAL LIGATION      Uterine suspension       Family History   Problem Relation Name Age of Onset    Breast cancer Neg Hx      Colon cancer Neg Hx      Ovarian cancer Neg Hx       Social History     Tobacco Use    Smoking status: Never    Smokeless tobacco: Never   Substance Use Topics    Alcohol use: Never    Drug use: Never     Review of Systems  See HPI  Physical Exam     Initial Vitals [02/11/25 0113]   BP Pulse Resp Temp SpO2   (!) 178/91 80 20 98.2 °F (36.8 °C) 96 %      MAP        --         Physical Exam    Nursing note and vitals reviewed.  Constitutional: She appears well-developed and well-nourished. She is not diaphoretic. No distress.   HENT:   Head: Normocephalic and atraumatic.   Right Ear: External ear normal.   Left Ear: External ear normal.   Nose: Nose normal. Mouth/Throat: Oropharynx is clear and moist. No oropharyngeal exudate.   Eyes: Conjunctivae are normal. No scleral icterus.   Neck: Neck supple. No tracheal deviation present.   Normal range of motion.  Cardiovascular:  Normal rate, regular rhythm, normal heart sounds and intact distal pulses.     Exam reveals no gallop and no friction rub.       No murmur heard.  Pulmonary/Chest: Breath sounds normal. No respiratory distress. She has no wheezes. She has no rhonchi. She has no rales. She exhibits no tenderness.   Abdominal: Abdomen is soft. Bowel sounds are normal. There is no abdominal tenderness.   Musculoskeletal:         General: No tenderness or edema.      Cervical back: Normal range of motion and neck supple.      Comments: Bilateral 1+ DP pulses.  Small abrasion noted to left big toe, healing.  No open ulcers.  Bilateral feet are warm to touch.  No pitting edema to lower extremities.  No pain to calf squeeze.     Neurological: She is alert and oriented to person, place, and time. GCS score is 15. GCS eye subscore is 4. GCS verbal subscore is 5. GCS motor subscore is 6.   Skin: Skin is warm and dry.   No jaundice   Psychiatric: She has a normal mood and affect. Thought content normal.         ED Course   Procedures  Labs Reviewed   COMPREHENSIVE METABOLIC PANEL - Abnormal       Result Value    Sodium 138      Potassium 3.9      Chloride 104      CO2 27      Glucose 122 (*)     BUN 16      Creatinine 0.9      Calcium 9.1      Total Protein 6.8      Albumin 3.6      Total Bilirubin 0.5      Alkaline Phosphatase 52      AST 29      ALT 26      eGFR >60      Anion Gap 7 (*)    B-TYPE NATRIURETIC PEPTIDE - Abnormal      (*)    TSH - Abnormal    TSH 4.885 (*)    CBC W/ AUTO DIFFERENTIAL    WBC 7.67      RBC 4.57      Hemoglobin 12.9      Hematocrit 40.2      MCV 88      MCH 28.2      MCHC 32.1      RDW 13.9      Platelets 234      MPV 10.8      Immature Granulocytes 0.3      Gran # (ANC) 5.1      Immature Grans (Abs) 0.02      Lymph # 2.0      Mono # 0.5      Eos # 0.1      Baso # 0.04      nRBC 0      Gran % 66.8      Lymph % 25.4      Mono % 6.1      Eosinophil % 0.9      Basophil % 0.5      Differential Method Automated     TROPONIN I    Troponin I <0.006     TROPONIN I    Troponin I <0.006     MAGNESIUM    Magnesium 1.8     PROTIME-INR    Prothrombin Time 10.8      INR 1.0     APTT    aPTT 25.0     TSH   T4, FREE    Free T4 0.94     TROPONIN I    Troponin I <0.006     TROPONIN I   LIPID PANEL   HEMOGLOBIN A1C   SARS-COV-2 RDRP GENE   POCT INFLUENZA A/B MOLECULAR   POCT GLUCOSE MONITORING CONTINUOUS          Imaging Results              X-Ray Chest 1 View (Final result)  Result time 02/11/25 02:52:08      Final result by Marisel Albarran MD (02/11/25 02:52:08)                   Impression:      Please see above.      Electronically signed by: Marisel Albarran MD  Date:    02/11/2025  Time:    02:52               Narrative:    EXAMINATION:  XR CHEST 1 VIEW    CLINICAL HISTORY:  Chest pain, unspecified    TECHNIQUE:  Single frontal view of the chest was performed.    COMPARISON:  05/27/2024    FINDINGS:  Cardiac monitoring leads overlie the chest.  There is continued enlargement of the cardiomediastinal silhouette.  The lungs are symmetrically expanded with increased interstitial and parenchymal attenuation suggestive of pulmonary edema/CHF although findings can be seen with infectious process.  There is patchy bibasilar opacities.  Probable lipoma in the left pleural fluid noted.  No evidence of pneumothorax.  Osseous structures intact with degenerative change.                                       Medications   apixaban  tablet 5 mg (has no administration in time range)   aspirin EC tablet 81 mg (has no administration in time range)   gabapentin capsule 300 mg (has no administration in time range)   losartan tablet 100 mg (has no administration in time range)   hydroCHLOROthiazide tablet 12.5 mg (has no administration in time range)   metoprolol succinate (TOPROL-XL) 24 hr tablet 100 mg (has no administration in time range)   pantoprazole EC tablet 40 mg (has no administration in time range)   traMADoL tablet 50 mg (has no administration in time range)   hydrALAZINE injection 5 mg (has no administration in time range)   nitroGLYCERIN SL tablet 0.4 mg (has no administration in time range)   melatonin tablet 6 mg (has no administration in time range)   ondansetron injection 4 mg (has no administration in time range)   polyethylene glycol packet 17 g (has no administration in time range)   acetaminophen tablet 650 mg (has no administration in time range)   orphenadrine injection 60 mg (60 mg Intravenous Given 2/11/25 0340)   aspirin EC tablet 325 mg (325 mg Oral Given 2/11/25 0340)   furosemide injection 40 mg (40 mg Intravenous Given 2/11/25 0508)   pantoprazole injection 40 mg (40 mg Intravenous Given 2/11/25 0508)     Medical Decision Making  Amount and/or Complexity of Data Reviewed  Labs: ordered.  Radiology:  Decision-making details documented in ED Course.    Risk  OTC drugs.  Prescription drug management.      Additional MDM:   Heart Score:    History:          Slightly suspicious.  ECG:             Nonspecific repolarisation disturbance  Age:               >65 years  Risk factors: >= 3 risk factors or history of atherosclerotic disease  Troponin:       Less than or equal to normal limit  Heart Score = 5                ED Course as of 02/11/25 0602 Tue Feb 11, 2025 0203 Differential Diagnosis includes, but is not limited to:  ACS/MI, PE, aortic dissection, pneumothorax, cardiac tamponade, pericarditis/myocarditis,  pneumonia, infection/abscess, lung mass, trauma/fracture, costochondritis/pleurisy, MSK pain/contusion, GERD, biliary disease, pancreatitis, anemia  [CC]   0257 X-Ray Chest 1 View  FINDINGS:  Cardiac monitoring leads overlie the chest.  There is continued enlargement of the cardiomediastinal silhouette.  The lungs are symmetrically expanded with increased interstitial and parenchymal attenuation suggestive of pulmonary edema/CHF although findings can be seen with infectious process.  There is patchy bibasilar opacities.  Probable lipoma in the left pleural fluid noted.  No evidence of pneumothorax.  Osseous structures intact with degenerative change.     Impression:     Please see above.      [CC]   0432 EKG: Rate 82, irregularly irregular rhythm, atrial fibrillation, nonspecific STT wave abnormalities, T-wave inversion noted infero lateral leads, similar to previous EKG.  No ST elevations depressions noted.  Interpreted by me, reviewed by me. [CC]   1300 84-year-old past medical history of hypertension, hyperlipidemia, atrial fibrillation, morbid obesity presenting to the emergency department for evaluation of chest pain.  Midsternal chest pain radiating to her bilateral arms.  History of CHF.  Pulmonary edema noted on chest x-ray.  I have ordered Lasix.  EKGs AFib similar to previous with nonspecific ST abnormalities in the inferior lateral leads.  She is satting well on room air.  Initial troponin is negative.  Repeat is pending.  Electrolytes within normal limits, doubt derangement.  Coags within normal limits.  TSH elevated with a normal T4.  She is afebrile, not tachycardic, no leukocytosis, doubt pneumonia.  She notes mild sore throat with hoarseness, posterior oropharynx is unremarkable.  No anemia noted.  Doubt symptomatic anemia.  Platelet count is normal.  Kidney function liver function unremarkable.  COVID and flu were pending.  Blood pressure 168/85.  She received aspirin and Norflex in the emergency  department.  Plan to admit for ACS rule out/atypical chest pain rule out.  She has a heart score of 5. [CC]   0602   After review of the patient's physical exam, ED testing, and history/symptoms, the patient requires additional care in the hospital for the treatment of illness(es) outlined in mdm . Discussed patients case with [facility] who will accept the patient and any pending labs/imaging/interventions.   I discussed the objective findings with the patient including laboratory studies, diagnostic imaging, and any consultant involvement. The patient/ family was educated on their clinical presentation and all questions were answered. They acknowledged and verbally agreed to the treatment plan. The patient will be admitted to the hospital for further management.      [CC]      ED Course User Index  [CC] Tae Cadena MD                           Clinical Impression:  Final diagnoses:  [R07.9] Chest pain  [R07.89] Atypical chest pain (Primary)          ED Disposition Condition    Observation                 Tae Cadena MD  02/11/25 0602

## 2025-02-11 NOTE — HPI
84 y.o. female with longstanding persistent atrial fibrillation, COPD, chronic systolic heart failure dyslipidemia hypertension, hip fracture, impaired mobility, depression, obesity, sciatica, peripheral vascular disease, and DM 2 presents with a complaint of chest pain.  Pain is acute onset, located mid chest, does not radiate, duration several days.  Currently resolved.  She additionally complains of chronic hip and back pain as well as a foot specialist telling her that she needs to get the circulation in her legs evaluated.  Denies fever, chills, cough, dizziness, syncope, nausea, vomiting.  In the ED, EKG without evidence of acute ischemia, initial troponin negative.  TSH elevated with normal free T4.  Otherwise unremarkable for acute abnormality.  Placed in observation for ACS rule out.

## 2025-02-11 NOTE — ASSESSMENT & PLAN NOTE
"Patient's FSGs are controlled on current medication regimen.  Last A1c reviewed-   Lab Results   Component Value Date    HGBA1C 6.3 (H) 06/14/2017     Most recent fingerstick glucose reviewed- No results for input(s): "POCTGLUCOSE" in the last 24 hours.  Current correctional scale  Medium  Maintain anti-hyperglycemic dose as follows-   Antihyperglycemics (From admission, onward)      Start     Stop Route Frequency Ordered    02/11/25 1404  insulin aspart U-100 pen 0-10 Units         -- SubQ Before meals & nightly PRN 02/11/25 1304          Hold Oral hypoglycemics while patient is in the hospital.  "

## 2025-02-11 NOTE — HOSPITAL COURSE
Mrs. Schreiber was placed in observation for ACS rule out after presenting with chest pain.  EKG without evidence of acute ischemia, troponin negative x5.  Pain resolved and did not recur.  Echocardiogram unremarkable for acute change.  She was seen and evaluated by Cardiology with recommendation to start Imdur 30 mg daily, discharge, and follow-up with her Cardiologist, Dr. Singh at [facility] for further testing.  She additionally mentions chronic back and hip pain, Has upcoming appointment already scheduled with Reggie Roche MD, [facility].  Also complains of peripheral vascular disease.  Lower extremity arterial ultrasound obtained.  She already has established vascular care with iDone Lama MD, [facility] in [place] whom she saw just a few weeks ago in January 20, 2025.  Instructed to continue follow up care with these outpatient clinic providers.  All findings and plan discussed with patient using improved  service, all questions answered, she verbalizes understanding and agreement.  Discharged home in stable condition.

## 2025-02-11 NOTE — ASSESSMENT & PLAN NOTE
Reported chest pain for several days, EKG without evidence of acute ischemia and initial troponin negative.  Currently pain free.  Monitor on tele, obtain serial troponin, check echo, NPO.  Cardiology consult, appreciate recs.

## 2025-02-12 VITALS
RESPIRATION RATE: 23 BRPM | SYSTOLIC BLOOD PRESSURE: 128 MMHG | DIASTOLIC BLOOD PRESSURE: 85 MMHG | TEMPERATURE: 98 F | OXYGEN SATURATION: 96 % | WEIGHT: 293 LBS | HEIGHT: 62 IN | HEART RATE: 66 BPM | BODY MASS INDEX: 53.92 KG/M2

## 2025-02-12 LAB
POCT GLUCOSE: 109 MG/DL (ref 70–110)
POCT GLUCOSE: 115 MG/DL (ref 70–110)

## 2025-02-12 PROCEDURE — 99204 OFFICE O/P NEW MOD 45 MIN: CPT | Mod: ,,, | Performed by: INTERNAL MEDICINE

## 2025-02-12 PROCEDURE — 25000003 PHARM REV CODE 250: Performed by: INTERNAL MEDICINE

## 2025-02-12 PROCEDURE — G0378 HOSPITAL OBSERVATION PER HR: HCPCS

## 2025-02-12 PROCEDURE — 25000003 PHARM REV CODE 250: Performed by: HOSPITALIST

## 2025-02-12 RX ORDER — ISOSORBIDE MONONITRATE 30 MG/1
30 TABLET, EXTENDED RELEASE ORAL DAILY
Qty: 30 TABLET | Refills: 11 | Status: SHIPPED | OUTPATIENT
Start: 2025-02-12 | End: 2026-02-12

## 2025-02-12 RX ADMIN — ASPIRIN 81 MG: 81 TABLET, COATED ORAL at 08:02

## 2025-02-12 RX ADMIN — HYDROCHLOROTHIAZIDE 12.5 MG: 12.5 TABLET ORAL at 08:02

## 2025-02-12 RX ADMIN — METOPROLOL SUCCINATE 100 MG: 50 TABLET, EXTENDED RELEASE ORAL at 08:02

## 2025-02-12 RX ADMIN — GABAPENTIN 300 MG: 300 CAPSULE ORAL at 08:02

## 2025-02-12 RX ADMIN — LOSARTAN POTASSIUM 100 MG: 25 TABLET, FILM COATED ORAL at 08:02

## 2025-02-12 RX ADMIN — APIXABAN 5 MG: 5 TABLET, FILM COATED ORAL at 08:02

## 2025-02-12 RX ADMIN — PANTOPRAZOLE SODIUM 40 MG: 40 TABLET, DELAYED RELEASE ORAL at 08:02

## 2025-02-12 RX ADMIN — HYDROCODONE BITARTRATE AND ACETAMINOPHEN 1 TABLET: 5; 325 TABLET ORAL at 08:02

## 2025-02-12 NOTE — ED NOTES
Report received from Jordyn RN; pt resting calmly asleep in bed (arousable to voice) connected to continuous cardiac monitoring, BP cuff, & pulse ox; pt aware of current plan of care for hospital admission, pending/delayed room assignment upstairs, and boarder status in the ED; no distress noted.

## 2025-02-12 NOTE — HPI
Ms. Schreiber is an 84-year-old female with past medical history of paroxysmal atrial flutter on Eliquis, heart failure with mid-range ejection fraction, hypertension, hyperlipidemia, type 2 diabetes mellitus and morbid obesity who was admitted to the hospital with right hip pain and chest pain.    Cardiology is consulted for further evaluation.    She follows up with Dr. Singh at [facility].    Chest pain is substernal without specific aggravating or relieving factors.  This has been going on for the last few days.  No relationship with exertion.  Associated with mild shortness of breath.  He was also complaining of right hip pain.  She has limited ambulation due to sciatica.    EKG done in the ED showed atrial fibrillation with controlled ventricular response and ST-T wave changes in inferolateral leads concerning for ischemia.  Echocardiogram done yesterday showed similar ejection fraction to prior (around 45%).  This morning when I saw her, she was hemodynamically stable and did not had any chest pain.

## 2025-02-12 NOTE — PLAN OF CARE
[facility] - Emergency Dept  Discharge Final Note    Primary Care Provider: Pedrito Cunningham MD  Case Management Final Discharge Note      Discharge Disposition: home    New DME ordered / company name: none    Relevant SDOH / Transition of Care Barriers:  none    Person available to provide assistance at home when needed and their contact information: Shanique March; daughter;      Scheduled followup appointment: Patient to schedule followup at [facility].    Referrals placed: none    Transportation: Family        Patient and family educated on discharge services and updated on DC plan. Bedside RN notified, patient clear to discharge from Case Management Perspective.     Expected Discharge Date: 2/12/2025    Final Discharge Note (most recent)       Final Note - 02/12/25 1226          Final Note    Assessment Type Final Discharge Note     Anticipated Discharge Disposition Home or Self Care     What phone number can be called within the next 1-3 days to see how you are doing after discharge? [phone]     Hospital Resources/Appts/Education Provided Post-Acute resouces added to AVS        Post-Acute Status    Discharge Delays None known at this time                     Important Message from Medicare             Contact Info       Pedrito Cunningham MD   Specialty: Pediatrics   Relationship: PCP - General    [address] [facility] [place] [address]   Phone: [phone]       Next Steps: Schedule an appointment as soon as possible for a visit in 7 day(s)    Instructions: Make a hospital followup appointment.  Request to be seen in 7 days.    [facility]    [facility]  [address] [place]  [address]       Next Steps: Call    Instructions: [employer] Activities available during the day with this organization.

## 2025-02-12 NOTE — PLAN OF CARE
SW attempted to contact daughter to advise of discharge.  Language line  used:  ID# [id number] Zackery.  No answer received.

## 2025-02-12 NOTE — DISCHARGE SUMMARY
[facility] [place] Emergency Dept  Hospital Medicine  Discharge Summary      Patient Name: Amy Schreiber  MRN: [id number]  ANA LUISA: [id number]  Patient Class: OP- Observation  Admission Date: 2/11/2025  Hospital Length of Stay: 0 days  Discharge Date and Time: 2/12/2025 12:52 PM  Attending Physician: Terry Ruano MD  Discharging Provider: Reese White Jr, NP  Primary Care Provider: Pedrito Cunningham MD    Primary Care Team: REESE WHITE    HPI:   84 y.o. female with longstanding persistent atrial fibrillation, COPD, chronic systolic heart failure dyslipidemia hypertension, hip fracture, impaired mobility, depression, obesity, sciatica, peripheral vascular disease, and DM 2 presents with a complaint of chest pain.  Pain is acute onset, located mid chest, does not radiate, duration several days.  Currently resolved.  She additionally complains of chronic hip and back pain as well as a foot specialist telling her that she needs to get the circulation in her legs evaluated.  Denies fever, chills, cough, dizziness, syncope, nausea, vomiting.  In the ED, EKG without evidence of acute ischemia, initial troponin negative.  TSH elevated with normal free T4.  Otherwise unremarkable for acute abnormality.  Placed in observation for ACS rule out.    * No surgery found *      Hospital Course:   Mrs. Schreiber was placed in observation for ACS rule out after presenting with chest pain.  EKG without evidence of acute ischemia, troponin negative x5.  Pain resolved and did not recur.  Echocardiogram unremarkable for acute change.  She was seen and evaluated by Cardiology with recommendation to start Imdur 30 mg daily, discharge, and follow-up with her Cardiologist, Dr. Singh at [facility] for further testing.  She additionally mentions chronic back and hip pain, Has upcoming appointment already scheduled with Reggie Roche MD, [facility].  Also complains of peripheral vascular disease.  Lower extremity  "arterial ultrasound obtained.  She already has established vascular care with Dione Lama MD, [facility] in [place] whom she saw just a few weeks ago in January 20, 2025.  Instructed to continue follow up care with these outpatient clinic providers.  All findings and plan discussed with patient using improved  service, all questions answered, she verbalizes understanding and agreement.  Discharged home in stable condition.     Goals of Care Treatment Preferences:  Code Status: Full Code         Consults:   Consults (From admission, onward)          Status Ordering Provider     Inpatient consult to Cardiology  Once        Provider:  Saima Brooke MD    Completed ENDY GARCIA JR     Inpatient consult to Registered Dietitian/Nutritionist  Once        Provider:  (Not yet assigned)    Completed JERRELL DEGROOT     Inpatient consult to Social Work/Case Management  Once        Provider:  (Not yet assigned)    Completed JERRELL DEGROOT            * Chest pain  Reported chest pain for several days, EKG without evidence of acute ischemia and initial troponin negative.  Currently pain free.  Monitor on tele, obtain serial troponin, check echo, NPO.  Cardiology consult, appreciate recs.    Type 2 diabetes mellitus with diabetic cataract  Patient's FSGs are controlled on current medication regimen.  Last A1c reviewed-   Lab Results   Component Value Date    HGBA1C 6.3 (H) 06/14/2017     Most recent fingerstick glucose reviewed- No results for input(s): "POCTGLUCOSE" in the last 24 hours.  Current correctional scale  Medium  Maintain anti-hyperglycemic dose as follows-   Antihyperglycemics (From admission, onward)      Start     Stop Route Frequency Ordered    02/11/25 1404  insulin aspart U-100 pen 0-10 Units         -- SubQ Before meals & nightly PRN 02/11/25 1304          Hold Oral hypoglycemics while patient is in the " hospital.    Sciatica of right side  Chronic, continue as needed analgesics    Peripheral vascular disease  Chronic, check ultrasound    Longstanding persistent atrial fibrillation  Patient has long standing persistent (>12 months) atrial fibrillation. Patient is currently in atrial fibrillation. [id number] Score: 3. The patients heart rate in the last 24 hours is as follows:  Pulse  Min: 75  Max: 90     Antiarrhythmics  metoprolol succinate (TOPROL-XL) 24 hr tablet 100 mg, Daily, Oral    Anticoagulants  apixaban tablet 5 mg, 2 times daily, Oral    Plan  - Replete lytes with a goal of K>4, Mg >2  - Patient is anticoagulated, see medications listed above.  - Patient's afib is currently controlled    Fracture of hip  Chronic, as needed analgesics    Chronic systolic congestive heart failure  Patient has Systolic (HFrEF) heart failure that is Chronic. On presentation their CHF was well compensated. Most recent BNP and echo results are listed below.  Recent Labs     02/11/25  0149   *     Latest ECHO  Results for orders placed during the hospital encounter of 03/09/22    Echo    Interpretation Summary  · The left ventricle is normal in size with mild concentric hypertrophy and low normal systolic function.  · The estimated ejection fraction is 50%.  · Mild right ventricular enlargement with low normal right ventricular systolic function.  · The estimated PA systolic pressure is 18 mmHg.  · Atrial flutter observed.    Current Heart Failure Medications  losartan tablet 100 mg, Daily, Oral  hydroCHLOROthiazide tablet 12.5 mg, Daily, Oral  metoprolol succinate (TOPROL-XL) 24 hr tablet 100 mg, Daily, Oral  hydrALAZINE injection 5 mg, Every 6 hours PRN, Intravenous    Plan  - Monitor strict I&Os and daily weights.    - Place on telemetry  - Low sodium diet  - Place on fluid restriction of 2 L.   - Cardiology has been consulted  - The patient's volume status is at their baseline    Chronic obstructive pulmonary disease,  unspecified  Well-controlled, p.r.n. nebs.    Major depressive disorder, recurrent, unspecified  Patient has recurrent depression which is moderate and is currently controlled. Will Continue anti-depressant medications. We will not consult psychiatry at this time. Patient does not display psychosis at this time. Continue to monitor closely and adjust plan of care as needed.    Dyslipidemia  Continue statin    Hypertension, benign  Patient's blood pressure range in the last 24 hours was: BP  Min: 105/81  Max: 178/91.The patient's inpatient anti-hypertensive regimen is listed below:  Current Antihypertensives  losartan tablet 100 mg, Daily, Oral  hydroCHLOROthiazide tablet 12.5 mg, Daily, Oral  metoprolol succinate (TOPROL-XL) 24 hr tablet 100 mg, Daily, Oral  hydrALAZINE injection 5 mg, Every 6 hours PRN, Intravenous  nitroGLYCERIN SL tablet 0.4 mg, Every 5 min PRN, Sublingual    Plan  - BP is controlled, no changes needed to their regimen      Final Active Diagnoses:    Diagnosis Date Noted POA    PRINCIPAL PROBLEM:  Chest pain [R07.9]  Yes    Peripheral vascular disease [I73.9] 01/24/2025 Yes     Chronic    Chronic obstructive pulmonary disease, unspecified [J44.9] 12/29/2024 Yes     Chronic    Type 2 diabetes mellitus with diabetic cataract [E11.36] 12/29/2024 Yes     Chronic    Chronic systolic congestive heart failure [I50.22] 02/02/2024 Yes     Chronic    Longstanding persistent atrial fibrillation [I48.11] 11/02/2023 Yes     Chronic    Major depressive disorder, recurrent, unspecified [F33.9] 09/27/2021 Yes     Chronic    Dyslipidemia [E78.5] 10/06/2016 Yes     Chronic    Hypertension, benign [I10] 05/04/2016 Yes     Chronic    Fracture of hip [S72.009A] 11/07/2013 Yes     Chronic    Sciatica of right side [M54.31] 08/17/2013 Yes     Chronic      Problems Resolved During this Admission:       Discharged Condition: stable    Disposition: Home or Self Care    Follow Up:   Follow-up Information       Octavio  Pedrito VENTURA MD. Schedule an appointment as soon as possible for a visit in 7 day(s).    Specialty: Pediatrics  Why: Make a hospital followup appointment.  Request to be seen in 7 days.  Contact information:  [address]  [facility] [address] [phone]               [facility]. Call.    Why: [employer] Activities available during the day with this organization.  Contact information:  [facility]  [address] [place]  [address]                         Patient Instructions:      Diet Cardiac     Activity as tolerated       Significant Diagnostic Studies: Labs: CMP   Recent Labs   Lab 02/11/25  0149      K 3.9      CO2 27   *   BUN 16   CREATININE 0.9   CALCIUM 9.1   PROT 6.8   ALBUMIN 3.6   BILITOT 0.5   ALKPHOS 52   AST 29   ALT 26   ANIONGAP 7*   , CBC   Recent Labs   Lab 02/11/25  0149   WBC 7.67   HGB 12.9   HCT 40.2      , and Troponin   Recent Labs   Lab 02/11/25  0508 02/11/25  0608 02/11/25  0834   TROPONINI <0.006 0.006 <0.006     Cardiac Graphics: Echocardiogram: Transthoracic echo (TTE) complete (Cupid Only):   Results for orders placed or performed during the hospital encounter of 02/11/25   Echo Saline Bubble? No   Result Value Ref Range    BSA 2.44 m2    LVOT stroke volume 40.2 cm3    LVIDd 4.0 3.5 - 6.0 cm    LV Systolic Volume 36.68 mL    LV Systolic Volume Index 16.2 mL/m2    LVIDs 3.1 2.1 - 4.0 cm    LV Diastolic Volume 71.47 mL    LV Diastolic Volume Index 31.48 mL/m2    Left Ventricular End Systolic Volume by Teichholz Method 36.68 mL    Left Ventricular End Diastolic Volume by Teichholz Method 71.47 mL    IVS 1.1 0.6 - 1.1 cm    LVOT diameter 2.0 cm    LVOT area 3.1 cm2    FS 22.5 (A) 28 - 44 %    Left Ventricle Relative Wall Thickness 0.50 cm    PW 1.0 0.6 - 1.1 cm    LV mass 136.2 g    LV Mass Index 60.0 g/m2    MV Peak E Jose C 0.92 m/s    TDI LATERAL 0.09 m/s    TDI SEPTAL  0.09 m/s    E/E' ratio 10 m/s    TR Max Jose C 2.3 m/s    E wave deceleration time 270 msec    LV SEPTAL E/E' RATIO 10.2 m/s    LV LATERAL E/E' RATIO 10.2 m/s    LVOT peak jose c 0.7 m/s    Left Ventricular Outflow Tract Mean Velocity 0.50 cm/s    Left Ventricular Outflow Tract Mean Gradient 1.08 mmHg    RV- puentes basal diam 3.9 cm    RV S' 11.28 cm/s    TAPSE 1.40 cm    RV/LV Ratio 0.98 cm    LA size 4.1 cm    Left Atrium Minor Axis 5.9 cm    Left Atrium Major Axis 5.5 cm    RA Major Axis 5.31 cm    AV mean gradient 3 mmHg    AV peak gradient 4 mmHg    Ao peak jose c 1.0 m/s    Ao VTI 16.3 cm    LVOT peak VTI 12.8 cm    AV valve area 2.5 cm²    AV Velocity Ratio 0.70     AV index (prosthetic) 0.79     NORBERTO by Velocity Ratio 2.2 cm²    MV stenosis pressure 1/2 time 78.24 ms    MV valve area p 1/2 method 2.81 cm2    Triscuspid Valve Regurgitation Peak Gradient 21 mmHg    PV PEAK VELOCITY 0.68 m/s    PV peak gradient 2 mmHg    Sinus 3.26 cm    STJ 2.97 cm    IVC diameter 1.32 cm    Mean e' 0.09 m/s    ZLVIDS -3.94     ZLVIDD -7.46     RVDD 3.90 cm    WAQAS 34 mL/m2    LA Vol 77 cm3    LA WIDTH 3.9 cm    RA Width 4.1 cm    TV resting pulmonary artery pressure 24 mmHg    RV TB RVSP 5 mmHg    Est. RA pres 3 mmHg    Narrative      Technically difficult study    Left Ventricle: The left ventricle is normal in size. Mildly increased   wall thickness. There is concentric remodeling. Unable to assess wall   motion. There is mildly reduced systolic function with a visually   estimated ejection fraction of 45 - 50%. Unable to assess diastolic   function due to atrial fibrillation.    Right Ventricle: Normal right ventricular cavity size. Systolic   function is normal.    Aortic Valve: There is mild aortic valve sclerosis.    Pulmonary Artery: The estimated pulmonary artery systolic pressure is   24 mmHg.    IVC/SVC: Normal venous pressure at 3 mmHg.         Pending Diagnostic Studies:       None           Medications:  Reconciled Home  Medications:      Medication List        START taking these medications      isosorbide mononitrate 30 MG 24 hr tablet  Commonly known as: IMDUR  Take 1 tablet (30 mg total) by mouth once daily.            CONTINUE taking these medications      aspirin 81 MG Chew  Take 1 tablet by mouth once daily.     atorvastatin 40 MG tablet  Commonly known as: LIPITOR  Take 1 tablet by mouth every evening.     ELIQUIS 5 mg Tab  Generic drug: apixaban  Take 5 mg by mouth 2 (two) times daily.     gabapentin 300 MG capsule  Commonly known as: NEURONTIN  Take 300 mg by mouth 3 (three) times daily.     losartan-hydrochlorothiazide 100-25 mg 100-25 mg per tablet  Commonly known as: HYZAAR  Take 1 tablet by mouth once daily.     metoprolol succinate 100 MG 24 hr tablet  Commonly known as: TOPROL-XL  Take 1 tablet (100 mg total) by mouth once daily.              Indwelling Lines/Drains at time of discharge:   Lines/Drains/Airways       None                   Time spent on the discharge of patient: 35 minutes         Reese White Jr NP  Department of Hospital Medicine  [facility] [place] - Emergency Dept

## 2025-02-12 NOTE — SUBJECTIVE & OBJECTIVE
Past Medical History:   Diagnosis Date    Arthritis     Chronic back pain     Diabetes mellitus     High cholesterol     Hypertension     Spinal stenosis     Thyroid disease        Past Surgical History:   Procedure Laterality Date    EYE SURGERY      TREATMENT OF CARDIAC ARRHYTHMIA N/A 3/10/2022    Procedure: Cardioversion or Defibrillation;  Surgeon: David Dillon MD;  Location: [facility] CATH LAB;  Service: Cardiology;  Laterality: N/A;    TUBAL LIGATION      Uterine suspension         Review of patient's allergies indicates:  No Known Allergies    No current facility-administered medications on file prior to encounter.     Current Outpatient Medications on File Prior to Encounter   Medication Sig    apixaban (ELIQUIS) 5 mg Tab Take 5 mg by mouth 2 (two) times daily.    aspirin 81 MG Chew Take 1 tablet by mouth once daily.    atorvastatin (LIPITOR) 40 MG tablet Take 1 tablet by mouth every evening.    gabapentin (NEURONTIN) 300 MG capsule Take 300 mg by mouth 3 (three) times daily.    losartan-hydrochlorothiazide 100-25 mg (HYZAAR) 100-25 mg per tablet Take 1 tablet by mouth once daily.    metoprolol succinate (TOPROL-XL) 100 MG 24 hr tablet Take 1 tablet (100 mg total) by mouth once daily.    [DISCONTINUED] loratadine (CLARITIN) 10 mg tablet Take 1 tablet (10 mg total) by mouth once daily.     Family History    None       Tobacco Use    Smoking status: Never    Smokeless tobacco: Never   Substance and Sexual Activity    Alcohol use: Never    Drug use: Never    Sexual activity: Not Currently     Review of Systems   Cardiovascular:  Positive for chest pain. Negative for claudication, dyspnea on exertion, irregular heartbeat, leg swelling, near-syncope, orthopnea, palpitations, paroxysmal nocturnal dyspnea and syncope.   Respiratory:  Negative for cough, shortness of breath, snoring and sputum production.    Musculoskeletal:  Positive for joint pain.   Gastrointestinal:  Negative for abdominal pain, dysphagia,  heartburn, nausea and vomiting.   Neurological:  Negative for dizziness, headaches, loss of balance and weakness.     Objective:     Vital Signs (Most Recent):  Temp: 97.9 °F (36.6 °C) (02/12/25 0829)  Pulse: 94 (02/12/25 0829)  Resp: 20 (02/12/25 0829)  BP: (!) 122/53 (02/12/25 0824)  SpO2: 97 % (02/12/25 0829) Vital Signs (24h Range):  Temp:  [97.9 °F (36.6 °C)-98.6 °F (37 °C)] 97.9 °F (36.6 °C)  Pulse:  [68-94] 94  Resp:  [19-38] 20  SpO2:  [95 %-98 %] 97 %  BP: (104-146)/() 122/53     Weight: 136.1 kg (300 lb)  Body mass index is 54.87 kg/m².    SpO2: 97 %       No intake or output data in the 24 hours ending 02/12/25 1124    Lines/Drains/Airways       Peripheral Intravenous Line  Duration                  Peripheral IV - Single Lumen 02/11/25 0156 20 G Left Antecubital 1 day                     Physical Exam  HENT:      Head: Normocephalic and atraumatic.      Mouth/Throat:      Mouth: Mucous membranes are moist.   Eyes:      Extraocular Movements: Extraocular movements intact.      Pupils: Pupils are equal, round, and reactive to light.   Cardiovascular:      Rate and Rhythm: Normal rate. Rhythm irregular.      Pulses: Normal pulses.      Heart sounds: Normal heart sounds.   Pulmonary:      Effort: Pulmonary effort is normal.      Breath sounds: Normal breath sounds.   Abdominal:      General: Bowel sounds are normal.      Palpations: Abdomen is soft.   Musculoskeletal:      Left lower leg: No edema.   Skin:     General: Skin is warm.   Neurological:      General: No focal deficit present.      Mental Status: She is alert.   Psychiatric:         Mood and Affect: Mood normal.         Behavior: Behavior normal.            Current Medications:   apixaban  5 mg Oral BID    aspirin  81 mg Oral Daily    gabapentin  300 mg Oral TID    hydroCHLOROthiazide  12.5 mg Oral Daily    losartan  100 mg Oral Daily    metoprolol succinate  100 mg Oral Daily    pantoprazole  40 mg Oral Daily         Current  Facility-Administered Medications:     acetaminophen, 650 mg, Oral, Q6H PRN    dextrose 50%, 12.5 g, Intravenous, PRN    dextrose 50%, 25 g, Intravenous, PRN    glucagon (human recombinant), 1 mg, Intramuscular, PRN    glucose, 16 g, Oral, PRN    glucose, 24 g, Oral, PRN    hydrALAZINE, 5 mg, Intravenous, Q6H PRN    HYDROcodone-acetaminophen, 1 tablet, Oral, Q6H PRN    insulin aspart U-100, 0-10 Units, Subcutaneous, QID (AC + HS) PRN    melatonin, 6 mg, Oral, Nightly PRN    nitroGLYCERIN, 0.4 mg, Sublingual, Q5 Min PRN    ondansetron, 4 mg, Intravenous, Q6H PRN    polyethylene glycol, 17 g, Oral, BID PRN    traMADoL, 50 mg, Oral, Q6H PRN    Laboratory (all labs reviewed):  CBC:  Recent Labs   Lab 04/11/23  0807 06/17/23  0924 01/11/24  1516 05/27/24  1341 02/11/25  0149   WBC 6.44 8.47 7.27 8.61 7.67   Hemoglobin 13.2 12.6 13.4 12.3 12.9   Hematocrit 41.9 40.6 42.5 40.2 40.2   Platelets 298 269 351 273 234       CHEMISTRIES:  Recent Labs   Lab 03/09/22  1120 03/10/22  0411 04/11/23  0807 06/17/23  0924 01/11/24  1617 05/27/24  1341 02/11/25  0149   Glucose 98 108 125 H 114 H 127 H 89 122 H   Sodium 138 136 142 137 139 137 138   Potassium 4.6 4.5 3.8 4.6 4.0 4.0 3.9   BUN 13 13 22 15 17 16 16   Creatinine 0.8 0.8 1.0 0.9 1.0 0.8 0.9   eGFR if non  >60 >60  --   --   --   --   --    eGFR  --   --  57 A >60 56 A >60 >60   Calcium 9.2 8.8 8.9 9.5 8.9 9.8 9.1   Magnesium 2.0 2.0  --   --   --   --  1.8       CARDIAC BIOMARKERS:  Recent Labs   Lab 01/11/24  1617 02/11/25  0149 02/11/25  0508 02/11/25  0608 02/11/25  0834   Troponin I 0.012 <0.006  <0.006 <0.006 0.006 <0.006       COAGS:  Recent Labs   Lab 03/09/22  1114 06/17/23  0924 02/11/25  0149   INR 1.0 1.0 1.0       LIPIDS/LFTS:  Recent Labs   Lab 03/09/22  1120 03/10/22  0411 04/11/23  0807 06/17/23  0924 01/11/24  1617 05/27/24  1341 02/11/25  0149 02/11/25  0608   Cholesterol 189  --   --   --   --   --   --  195   Triglycerides 151 H  --   --    --   --   --   --  109   HDL 38 L  --   --   --   --   --   --  39 L   LDL Cholesterol 120.8  --   --   --   --   --   --  134.2   Non-HDL Cholesterol 151  --   --   --   --   --   --  156   AST 19   < > 17 34 24 15 29  --    ALT 22   < > 18 38 18 12 26  --     < > = values in this interval not displayed.       BNP:  Recent Labs   Lab 03/09/22  1114 04/11/23  0807 06/17/23  0924 01/11/24  1516 02/11/25  0149   BNP 63 37 183 H 21 114 H       TSH:  Recent Labs   Lab 03/09/22  1114 02/11/25  0149   TSH 2.443 4.885 H       Free T4:  Recent Labs   Lab 02/11/25  0149   Free T4 0.94       Diagnostic Results:  ECG (personally reviewed and interpreted tracing(s)):  EKG done on 11 February 2025 showed atrial fibrillation with controlled ventricular response.  There were ST-T wave changes in inferolateral leads concerning for ischemia    Chest X-Ray (personally reviewed and interpreted image(s)):  No acute cardiopulmonary process    Echo: 2/2025    Technically difficult study    Left Ventricle: The left ventricle is normal in size. Mildly increased wall thickness. There is concentric remodeling. Unable to assess wall motion. There is mildly reduced systolic function with a visually estimated ejection fraction of 45 - 50%. Unable to assess diastolic function due to atrial fibrillation.    Right Ventricle: Normal right ventricular cavity size. Systolic function is normal.    Aortic Valve: There is mild aortic valve sclerosis.    Pulmonary Artery: The estimated pulmonary artery systolic pressure is 24 mmHg.    IVC/SVC: Normal venous pressure at 3 mmHg.    Stress Test: NA    Cath: NA

## 2025-02-12 NOTE — CONSULTS
[facility] Emergency Dept  Cardiology  Consult Note    Patient Name: Amy Schreiber  MRN: [id number]  Admission Date: 2/11/2025  Hospital Length of Stay: 0 days  Code Status: Full Code   Attending Provider: Terry Ruano, *   Consulting Provider: Saima Brooke MD  Primary Care Physician: Pedrito Cunningham MD  Principal Problem:Chest pain    Patient information was obtained from patient and ER records.     Inpatient consult to Cardiology  Consult performed by: Saima Brooke MD  Consult ordered by: Reese White Jr., NP        Subjective:     Chief Complaint:  Chest pain    HPI:   Ms. Schreiber is an 84-year-old female with past medical history of paroxysmal atrial flutter on Eliquis, heart failure with mid-range ejection fraction, hypertension, hyperlipidemia, type 2 diabetes mellitus and morbid obesity who was admitted to the hospital with right hip pain and chest pain.    Cardiology is consulted for further evaluation.    She follows up with Dr. Singh at [facility].    Chest pain is substernal without specific aggravating or relieving factors.  This has been going on for the last few days.  No relationship with exertion.  Associated with mild shortness of breath.  She was also complaining of right hip pain.  She has limited ambulation due to sciatica/hip pain.    EKG done in the ED showed atrial fibrillation with controlled ventricular response and ST-T wave changes in inferolateral leads concerning for ischemia.  Echocardiogram done yesterday showed similar ejection fraction to prior (around 45%).  This morning when I saw her, she was hemodynamically stable and did not had any chest pain.    Past Medical History:   Diagnosis Date    Arthritis     Chronic back pain     Diabetes mellitus     High cholesterol     Hypertension     Spinal stenosis     Thyroid disease        Past Surgical History:   Procedure Laterality Date    EYE SURGERY      TREATMENT OF CARDIAC ARRHYTHMIA N/A 3/10/2022     Procedure: Cardioversion or Defibrillation;  Surgeon: David Dillon MD;  Location: [facility] CATH LAB;  Service: Cardiology;  Laterality: N/A;    TUBAL LIGATION      Uterine suspension         Review of patient's allergies indicates:  No Known Allergies    No current facility-administered medications on file prior to encounter.     Current Outpatient Medications on File Prior to Encounter   Medication Sig    apixaban (ELIQUIS) 5 mg Tab Take 5 mg by mouth 2 (two) times daily.    aspirin 81 MG Chew Take 1 tablet by mouth once daily.    atorvastatin (LIPITOR) 40 MG tablet Take 1 tablet by mouth every evening.    gabapentin (NEURONTIN) 300 MG capsule Take 300 mg by mouth 3 (three) times daily.    losartan-hydrochlorothiazide 100-25 mg (HYZAAR) 100-25 mg per tablet Take 1 tablet by mouth once daily.    metoprolol succinate (TOPROL-XL) 100 MG 24 hr tablet Take 1 tablet (100 mg total) by mouth once daily.    [DISCONTINUED] loratadine (CLARITIN) 10 mg tablet Take 1 tablet (10 mg total) by mouth once daily.     Family History    None       Tobacco Use    Smoking status: Never    Smokeless tobacco: Never   Substance and Sexual Activity    Alcohol use: Never    Drug use: Never    Sexual activity: Not Currently     Review of Systems   Cardiovascular:  Positive for chest pain. Negative for claudication, dyspnea on exertion, irregular heartbeat, leg swelling, near-syncope, orthopnea, palpitations, paroxysmal nocturnal dyspnea and syncope.   Respiratory:  Negative for cough, shortness of breath, snoring and sputum production.    Musculoskeletal:  Positive for joint pain.   Gastrointestinal:  Negative for abdominal pain, dysphagia, heartburn, nausea and vomiting.   Neurological:  Negative for dizziness, headaches, loss of balance and weakness.     Objective:     Vital Signs (Most Recent):  Temp: 97.9 °F (36.6 °C) (02/12/25 0829)  Pulse: 94 (02/12/25 0829)  Resp: 20 (02/12/25 0829)  BP: (!) 122/53 (02/12/25 0824)  SpO2: 97 %  (02/12/25 0829) Vital Signs (24h Range):  Temp:  [97.9 °F (36.6 °C)-98.6 °F (37 °C)] 97.9 °F (36.6 °C)  Pulse:  [68-94] 94  Resp:  [19-38] 20  SpO2:  [95 %-98 %] 97 %  BP: (104-146)/() 122/53     Weight: 136.1 kg (300 lb)  Body mass index is 54.87 kg/m².    SpO2: 97 %       No intake or output data in the 24 hours ending 02/12/25 1124    Lines/Drains/Airways       Peripheral Intravenous Line  Duration                  Peripheral IV - Single Lumen 02/11/25 0156 20 G Left Antecubital 1 day                     Physical Exam  HENT:      Head: Normocephalic and atraumatic.      Mouth/Throat:      Mouth: Mucous membranes are moist.   Eyes:      Extraocular Movements: Extraocular movements intact.      Pupils: Pupils are equal, round, and reactive to light.   Cardiovascular:      Rate and Rhythm: Normal rate. Rhythm irregular.      Pulses: Normal pulses.      Heart sounds: Normal heart sounds.   Pulmonary:      Effort: Pulmonary effort is normal.      Breath sounds: Normal breath sounds.   Abdominal:      General: Bowel sounds are normal.      Palpations: Abdomen is soft.   Musculoskeletal:      Left lower leg: No edema.   Skin:     General: Skin is warm.   Neurological:      General: No focal deficit present.      Mental Status: She is alert.   Psychiatric:         Mood and Affect: Mood normal.         Behavior: Behavior normal.            Current Medications:   apixaban  5 mg Oral BID    aspirin  81 mg Oral Daily    gabapentin  300 mg Oral TID    hydroCHLOROthiazide  12.5 mg Oral Daily    losartan  100 mg Oral Daily    metoprolol succinate  100 mg Oral Daily    pantoprazole  40 mg Oral Daily         Current Facility-Administered Medications:     acetaminophen, 650 mg, Oral, Q6H PRN    dextrose 50%, 12.5 g, Intravenous, PRN    dextrose 50%, 25 g, Intravenous, PRN    glucagon (human recombinant), 1 mg, Intramuscular, PRN    glucose, 16 g, Oral, PRN    glucose, 24 g, Oral, PRN    hydrALAZINE, 5 mg, Intravenous, Q6H  PRN    HYDROcodone-acetaminophen, 1 tablet, Oral, Q6H PRN    insulin aspart U-100, 0-10 Units, Subcutaneous, QID (AC + HS) PRN    melatonin, 6 mg, Oral, Nightly PRN    nitroGLYCERIN, 0.4 mg, Sublingual, Q5 Min PRN    ondansetron, 4 mg, Intravenous, Q6H PRN    polyethylene glycol, 17 g, Oral, BID PRN    traMADoL, 50 mg, Oral, Q6H PRN    Laboratory (all labs reviewed):  CBC:  Recent Labs   Lab 04/11/23  0807 06/17/23  0924 01/11/24  1516 05/27/24  1341 02/11/25  0149   WBC 6.44 8.47 7.27 8.61 7.67   Hemoglobin 13.2 12.6 13.4 12.3 12.9   Hematocrit 41.9 40.6 42.5 40.2 40.2   Platelets 298 269 351 273 234       CHEMISTRIES:  Recent Labs   Lab 03/09/22  1120 03/10/22  0411 04/11/23  0807 06/17/23  0924 01/11/24  1617 05/27/24  1341 02/11/25  0149   Glucose 98 108 125 H 114 H 127 H 89 122 H   Sodium 138 136 142 137 139 137 138   Potassium 4.6 4.5 3.8 4.6 4.0 4.0 3.9   BUN 13 13 22 15 17 16 16   Creatinine 0.8 0.8 1.0 0.9 1.0 0.8 0.9   eGFR if non  >60 >60  --   --   --   --   --    eGFR  --   --  57 A >60 56 A >60 >60   Calcium 9.2 8.8 8.9 9.5 8.9 9.8 9.1   Magnesium 2.0 2.0  --   --   --   --  1.8       CARDIAC BIOMARKERS:  Recent Labs   Lab 01/11/24  1617 02/11/25  0149 02/11/25  0508 02/11/25  0608 02/11/25  0834   Troponin I 0.012 <0.006  <0.006 <0.006 0.006 <0.006       COAGS:  Recent Labs   Lab 03/09/22  1114 06/17/23  0924 02/11/25  0149   INR 1.0 1.0 1.0       LIPIDS/LFTS:  Recent Labs   Lab 03/09/22  1120 03/10/22  0411 04/11/23  0807 06/17/23  0924 01/11/24  1617 05/27/24  1341 02/11/25  0149 02/11/25  0608   Cholesterol 189  --   --   --   --   --   --  195   Triglycerides 151 H  --   --   --   --   --   --  109   HDL 38 L  --   --   --   --   --   --  39 L   LDL Cholesterol 120.8  --   --   --   --   --   --  134.2   Non-HDL Cholesterol 151  --   --   --   --   --   --  156   AST 19   < > 17 34 24 15 29  --    ALT 22   < > 18 38 18 12 26  --     < > = values in this interval not displayed.        BNP:  Recent Labs   Lab 03/09/22  1114 04/11/23  0807 06/17/23  0924 01/11/24  1516 02/11/25  0149   BNP 63 37 183 H 21 114 H       TSH:  Recent Labs   Lab 03/09/22  1114 02/11/25  0149   TSH 2.443 4.885 H       Free T4:  Recent Labs   Lab 02/11/25  0149   Free T4 0.94       Diagnostic Results:  ECG (personally reviewed and interpreted tracing(s)):  EKG done on 11 February 2025 showed atrial fibrillation with controlled ventricular response.  There were ST-T wave changes in inferolateral leads concerning for ischemia    Chest X-Ray (personally reviewed and interpreted image(s)):  No acute cardiopulmonary process    Echo: 2/2025    Technically difficult study    Left Ventricle: The left ventricle is normal in size. Mildly increased wall thickness. There is concentric remodeling. Unable to assess wall motion. There is mildly reduced systolic function with a visually estimated ejection fraction of 45 - 50%. Unable to assess diastolic function due to atrial fibrillation.    Right Ventricle: Normal right ventricular cavity size. Systolic function is normal.    Aortic Valve: There is mild aortic valve sclerosis.    Pulmonary Artery: The estimated pulmonary artery systolic pressure is 24 mmHg.    IVC/SVC: Normal venous pressure at 3 mmHg.    Stress Test: NA    Cath: NA  Assessment and Plan:     * Chest pain  Atypical chest pain  EKG showed ST-T wave changes in inferolateral leads concerning for ischemia  Troponin negative x 5    Currently chest pain-free.  Doubt acute coronary syndrome.  Outpatient stress test recommended  Echocardiogram reviewed.  Ejection fraction is mildly reduced which has been known before.  Continue with baby aspirin and start atorvastatin 40 mg daily  Add Imdur 30 mg daily for anginal relief    Type 2 diabetes mellitus with diabetic cataract  Management per IM    Peripheral vascular disease  Continue with aspirin and statin  Needs follow up with vascular surgery    Longstanding persistent  atrial fibrillation  Currently in atrial fibrillation although rate controlled  Continue metoprolol succinate 100 mg daily and Eliquis 5 mg b.i.d.    May need to consider cardioversion as outpatient.  We will defer to primary cardiologist    Chronic systolic congestive heart failure  Continue metoprolol succinate 100 mg daily and losartan 100 mg daily  To consider adding Jardiance and SGLT2 inhibitor as outpatient  She is on p.r.n. Lasix 40 mg daily for worsening shortness of breath  Outpatient follow with Cardiology        VTE Risk Mitigation (From admission, onward)           Ordered     apixaban tablet 5 mg  2 times daily         02/11/25 0500                  Cardiology will sign off.    Saima Brooke MD  Cardiology   [facility] [place] [facility]

## 2025-02-12 NOTE — ASSESSMENT & PLAN NOTE
Continue metoprolol succinate 100 mg daily and losartan 100 mg daily  To consider adding Jardiance and SGLT2 inhibitor as outpatient  She is on p.r.n. Lasix 40 mg daily for worsening shortness of breath  Outpatient follow with Cardiology

## 2025-02-12 NOTE — ASSESSMENT & PLAN NOTE
Currently in atrial fibrillation although rate controlled  Continue metoprolol succinate 100 mg daily and Eliquis 5 mg b.i.d.    May need to consider cardioversion as outpatient.  We will defer to primary cardiologist

## 2025-02-12 NOTE — ASSESSMENT & PLAN NOTE
Atypical chest pain  EKG showed ST-T wave changes in inferolateral leads concerning for ischemia  Troponin negative x 5    Currently chest pain-free.  Doubt acute coronary syndrome.  Outpatient stress test recommended  Echocardiogram reviewed.  Ejection fraction is mildly reduced which has been known before.  Continue with baby aspirin and start atorvastatin 40 mg daily  Add Imdur 30 mg daily for anginal relief

## 2025-02-13 LAB
OHS QRS DURATION: 82 MS
OHS QRS DURATION: 82 MS
OHS QTC CALCULATION: 440 MS
OHS QTC CALCULATION: 455 MS

## 2025-02-24 ENCOUNTER — PATIENT OUTREACH (OUTPATIENT)
Dept: ADMINISTRATIVE | Facility: OTHER | Age: 85
End: 2025-02-24
Payer: MEDICARE

## 2025-02-24 NOTE — PROGRESS NOTES
CHW - Case Closure    This Community Health Worker spoke to patient via telephone today.   Pt reported: Spoke to ID#[id number] from [employer] to help assist with speaking with patient, states patient would only like referrals for food insecurities and transportation mail to her, and also agreed to case closure. Patient has graduated.   Pt denied any additional needs at this time and agrees with episode closure at this time.  Provided patient with Community Health Worker's contact information and encouraged her to contact this Community Health Worker if additional needs arise.

## 2025-07-08 ENCOUNTER — HOSPITAL ENCOUNTER (EMERGENCY)
Facility: HOSPITAL | Age: 85
Discharge: HOME | End: 2025-07-08
Attending: EMERGENCY MEDICINE
Payer: MEDICARE

## 2025-07-08 VITALS
HEART RATE: 72 BPM | SYSTOLIC BLOOD PRESSURE: 116 MMHG | HEIGHT: 66 IN | BODY MASS INDEX: 43.45 KG/M2 | TEMPERATURE: 98 F | DIASTOLIC BLOOD PRESSURE: 56 MMHG | WEIGHT: 270.38 LBS | RESPIRATION RATE: 18 BRPM | OXYGEN SATURATION: 97 %

## 2025-07-08 DIAGNOSIS — I50.22 CHRONIC SYSTOLIC CONGESTIVE HEART FAILURE: ICD-10-CM

## 2025-07-08 DIAGNOSIS — R60.9 FLUID RETENTION: Primary | ICD-10-CM

## 2025-07-08 DIAGNOSIS — I73.9 PVD (PERIPHERAL VASCULAR DISEASE): ICD-10-CM

## 2025-07-08 DIAGNOSIS — R91.1 NODULE OF RIGHT LUNG: ICD-10-CM

## 2025-07-08 DIAGNOSIS — I48.20 CHRONIC ATRIAL FIBRILLATION: ICD-10-CM

## 2025-07-08 PROBLEM — M48.062 SPINAL STENOSIS OF LUMBAR REGION WITH NEUROGENIC CLAUDICATION: Status: ACTIVE | Noted: 2025-06-06

## 2025-07-08 PROBLEM — G89.29 CHRONIC BILATERAL THORACIC BACK PAIN: Status: ACTIVE | Noted: 2025-06-04

## 2025-07-08 PROBLEM — M15.9 OSTEOARTHRITIS OF MULTIPLE JOINTS: Status: ACTIVE | Noted: 2021-07-20

## 2025-07-08 PROBLEM — M54.6 CHRONIC BILATERAL THORACIC BACK PAIN: Status: ACTIVE | Noted: 2025-06-04

## 2025-07-08 LAB
ABSOLUTE EOSINOPHIL (OHS): 0.08 K/UL
ABSOLUTE MONOCYTE (OHS): 0.56 K/UL (ref 0.3–1)
ABSOLUTE NEUTROPHIL COUNT (OHS): 4.19 K/UL (ref 1.8–7.7)
ALBUMIN SERPL BCP-MCNC: 3.8 G/DL (ref 3.5–5.2)
ALP SERPL-CCNC: 60 UNIT/L (ref 40–150)
ALT SERPL W/O P-5'-P-CCNC: 17 UNIT/L (ref 10–44)
ANION GAP (OHS): 8 MMOL/L (ref 8–16)
AST SERPL-CCNC: 18 UNIT/L (ref 11–45)
BACTERIA #/AREA URNS AUTO: NORMAL /HPF
BASOPHILS # BLD AUTO: 0.05 K/UL
BASOPHILS NFR BLD AUTO: 0.7 %
BILIRUB SERPL-MCNC: 0.5 MG/DL (ref 0.1–1)
BILIRUB UR QL STRIP.AUTO: NEGATIVE
BNP SERPL-MCNC: 107 PG/ML (ref 0–99)
BUN SERPL-MCNC: 24 MG/DL (ref 8–23)
CALCIUM SERPL-MCNC: 9.1 MG/DL (ref 8.7–10.5)
CHLORIDE SERPL-SCNC: 103 MMOL/L (ref 95–110)
CLARITY UR: CLEAR
CO2 SERPL-SCNC: 28 MMOL/L (ref 23–29)
COLOR UR AUTO: YELLOW
CREAT SERPL-MCNC: 0.9 MG/DL (ref 0.5–1.4)
ERYTHROCYTE [DISTWIDTH] IN BLOOD BY AUTOMATED COUNT: 14.1 % (ref 11.5–14.5)
GFR SERPLBLD CREATININE-BSD FMLA CKD-EPI: >60 ML/MIN/1.73/M2
GLUCOSE SERPL-MCNC: 97 MG/DL (ref 70–110)
GLUCOSE UR QL STRIP: ABNORMAL
HCT VFR BLD AUTO: 42.8 % (ref 37–48.5)
HGB BLD-MCNC: 13 GM/DL (ref 12–16)
HGB UR QL STRIP: NEGATIVE
HYALINE CASTS UR QL AUTO: 0 /LPF (ref 0–1)
IMM GRANULOCYTES # BLD AUTO: 0.05 K/UL (ref 0–0.04)
IMM GRANULOCYTES NFR BLD AUTO: 0.7 % (ref 0–0.5)
KETONES UR QL STRIP: NEGATIVE
LEUKOCYTE ESTERASE UR QL STRIP: NEGATIVE
LYMPHOCYTES # BLD AUTO: 2.71 K/UL (ref 1–4.8)
MCH RBC QN AUTO: 27.5 PG (ref 27–31)
MCHC RBC AUTO-ENTMCNC: 30.4 G/DL (ref 32–36)
MCV RBC AUTO: 91 FL (ref 82–98)
MICROSCOPIC COMMENT: NORMAL
NITRITE UR QL STRIP: NEGATIVE
NUCLEATED RBC (/100WBC) (OHS): 0 /100 WBC
PH UR STRIP: 7 [PH]
PLATELET # BLD AUTO: 268 K/UL (ref 150–450)
PMV BLD AUTO: 10.4 FL (ref 9.2–12.9)
POCT GLUCOSE: 107 MG/DL (ref 70–110)
POTASSIUM SERPL-SCNC: 4.5 MMOL/L (ref 3.5–5.1)
PROT SERPL-MCNC: 7.6 GM/DL (ref 6–8.4)
PROT UR QL STRIP: NEGATIVE
RBC # BLD AUTO: 4.72 M/UL (ref 4–5.4)
RBC #/AREA URNS AUTO: 1 /HPF (ref 0–4)
RELATIVE EOSINOPHIL (OHS): 1 %
RELATIVE LYMPHOCYTE (OHS): 35.5 % (ref 18–48)
RELATIVE MONOCYTE (OHS): 7.3 % (ref 4–15)
RELATIVE NEUTROPHIL (OHS): 54.8 % (ref 38–73)
SODIUM SERPL-SCNC: 139 MMOL/L (ref 136–145)
SP GR UR STRIP: 1.03
SQUAMOUS #/AREA URNS AUTO: 1 /HPF
TROPONIN I SERPL DL<=0.01 NG/ML-MCNC: <0.006 NG/ML
UROBILINOGEN UR STRIP-ACNC: NEGATIVE EU/DL
WBC # BLD AUTO: 7.64 K/UL (ref 3.9–12.7)
WBC #/AREA URNS AUTO: 2 /HPF (ref 0–5)
YEAST UR QL AUTO: NORMAL /HPF

## 2025-07-08 PROCEDURE — 83880 ASSAY OF NATRIURETIC PEPTIDE: CPT

## 2025-07-08 PROCEDURE — 99285 EMERGENCY DEPT VISIT HI MDM: CPT | Mod: 25

## 2025-07-08 PROCEDURE — 84484 ASSAY OF TROPONIN QUANT: CPT

## 2025-07-08 PROCEDURE — 85025 COMPLETE CBC W/AUTO DIFF WBC: CPT

## 2025-07-08 PROCEDURE — 93005 ELECTROCARDIOGRAM TRACING: CPT

## 2025-07-08 PROCEDURE — 63600175 PHARM REV CODE 636 W HCPCS: Performed by: PHYSICIAN ASSISTANT

## 2025-07-08 PROCEDURE — 81003 URINALYSIS AUTO W/O SCOPE: CPT

## 2025-07-08 PROCEDURE — 82962 GLUCOSE BLOOD TEST: CPT

## 2025-07-08 PROCEDURE — 84075 ASSAY ALKALINE PHOSPHATASE: CPT

## 2025-07-08 PROCEDURE — 96374 THER/PROPH/DIAG INJ IV PUSH: CPT

## 2025-07-08 PROCEDURE — 93010 ELECTROCARDIOGRAM REPORT: CPT | Mod: ,,, | Performed by: INTERNAL MEDICINE

## 2025-07-08 RX ORDER — FUROSEMIDE 40 MG/1
40 TABLET ORAL DAILY
Qty: 3 TABLET | Refills: 0 | Status: SHIPPED | OUTPATIENT
Start: 2025-07-08 | End: 2025-07-11

## 2025-07-08 RX ORDER — TRAZODONE HYDROCHLORIDE 50 MG/1
50 TABLET ORAL NIGHTLY
COMMUNITY
Start: 2025-06-06 | End: 2025-09-04

## 2025-07-08 RX ORDER — FUROSEMIDE 10 MG/ML
40 INJECTION INTRAMUSCULAR; INTRAVENOUS
Status: COMPLETED | OUTPATIENT
Start: 2025-07-08 | End: 2025-07-08

## 2025-07-08 RX ORDER — FUROSEMIDE 40 MG/1
40 TABLET ORAL DAILY PRN
COMMUNITY
Start: 2025-03-27

## 2025-07-08 RX ADMIN — FUROSEMIDE 40 MG: 10 INJECTION, SOLUTION INTRAVENOUS at 03:07

## 2025-07-08 NOTE — ED TRIAGE NOTES
"Patient presents to the ED with c/o " feeling like I was retaining fluids." Patient also reports feeling fatigue. Patient has not been compliant with meds. Hx of CHF  "

## 2025-07-08 NOTE — DISCHARGE INSTRUCTIONS
Cristela por venir a nuestro Departamento de Emergencias hoy. Es importante recordar que algunos problemas son difíciles de diagnosticar y es posible que no se detecten nava whipple primera visita. Asegúrese de hacer un seguimiento con whipple médico de atención primaria.  Si no tiene sony, puede comunicarse con el que figura en whipple documentación de agata o también puede llamar a la [facility] al [phone] para programar lauri rani con sony.    Regrese a la moises de emergencias con cualquier pregunta / inquietud, síntomas nuevos / preocupantes, empeoramiento o falta de mejora. No conduzca ni tome decisiones importantes nava 24 horas si ha recibido analgésicos, sedantes o fármacos que alteran el estado de ánimo nava whipple visita a la moises de emergencias.

## 2025-07-08 NOTE — ED PROVIDER NOTES
Encounter Date: 7/8/2025       History     Chief Complaint   Patient presents with    Fatigue     Patinet c/o  fatigue and fluid retention  x 3 weeks. Denies pain. Reports LBP but is currently receiving treatment.      84-year-old female with history of chronic back pain, diabetes, CHF, and atrial fibrillation (on Eliquis) presents to the emergency department with daughter for 3 week history of bilateral lower extremity swelling and abdominal distention.  States she feels shortness of breath when walking that she feels is related to fluid retention.  No SOB when sitting still. Also notes some general fatigue during this timeline.  No history of similar symptoms and has never previously been on Lasix.  Denies pain, including for chest pain and abdominal pain.  Denies nausea, vomiting, fever, and paresthesia. No longer on HCTZ.     2/2025 ECHO:  ejection fraction of 45 - 50%    6/2025 STRESS TEST:  Myocardial perfusion imaging is probably normal.    *Technically suboptimal study 2nd to motion and diaphragm artifact   Cannot rule out small area of ischemia/infarct in RCA or Lcx territory   Clinical correlation recommended   Normal left ventricular systolic function.    Overall appears to be low risk study       The history is provided by the patient. The history is limited by a language barrier. A  was used ().     Review of patient's allergies indicates:  No Known Allergies  Past Medical History:   Diagnosis Date    Arthritis     Chronic back pain     Diabetes mellitus     High cholesterol     Hypertension     Spinal stenosis     Thyroid disease      Past Surgical History:   Procedure Laterality Date    EYE SURGERY      TREATMENT OF CARDIAC ARRHYTHMIA N/A 3/10/2022    Procedure: Cardioversion or Defibrillation;  Surgeon: David Dillon MD;  Location: [facility] CATH LAB;  Service: Cardiology;  Laterality: N/A;    TUBAL LIGATION      Uterine suspension       Family History   Problem Relation  Name Age of Onset    Breast cancer Neg Hx      Colon cancer Neg Hx      Ovarian cancer Neg Hx       Social History[1]  Review of Systems   Constitutional:  Negative for fever.   Respiratory:  Positive for shortness of breath (THOMAS).    Cardiovascular:  Positive for leg swelling (BLE). Negative for chest pain.   Gastrointestinal:  Positive for abdominal distention. Negative for abdominal pain, nausea and vomiting.   Musculoskeletal:  Negative for back pain, joint swelling and neck pain.   Skin:  Negative for color change and wound.   Neurological:  Negative for numbness.   All other systems reviewed and are negative.      Physical Exam     Initial Vitals [07/08/25 1144]   BP Pulse Resp Temp SpO2   (!) 148/68 73 18 98.2 °F (36.8 °C) 96 %      MAP       --         Physical Exam    Nursing note and vitals reviewed.  Constitutional: She is not diaphoretic. No distress.   Body mass index is 43.64 kg/m².   HENT:   Head: Atraumatic.   Right Ear: External ear normal.   Left Ear: External ear normal.   Eyes: Conjunctivae and EOM are normal.   Neck: No tracheal deviation present. No JVD present.   Normal range of motion.  Cardiovascular:  Normal rate and regular rhythm.           Pulmonary/Chest: No accessory muscle usage or stridor. No tachypnea. No respiratory distress.   Abdominal: Abdomen is soft. She exhibits no distension. There is no abdominal tenderness. There is no guarding.   Musculoskeletal:         General: Normal range of motion.      Cervical back: Normal range of motion.      Comments: 1+ pitting edema to bilateral lower extremities.  1+ lower extremity pulses bilaterally; warm.  Full ROM of lower extremities.  No discernible swelling to remainder of body, which is a part limited due to body habitus.     Neurological: She is alert and oriented to person, place, and time. She displays no tremor. She displays no seizure activity. Coordination and gait normal.   Skin: Skin is intact. No rash noted. No pallor.          ED Course   Procedures  Labs Reviewed   COMPREHENSIVE METABOLIC PANEL - Abnormal       Result Value    Sodium 139      Potassium 4.5      Chloride 103      CO2 28      Glucose 97      BUN 24 (*)     Creatinine 0.9      Calcium 9.1      Protein Total 7.6      Albumin 3.8      Bilirubin Total 0.5      ALP 60      AST 18      ALT 17      Anion Gap 8      eGFR >60     URINALYSIS, REFLEX TO URINE CULTURE - Abnormal    Color, UA Yellow      Appearance, UA Clear      pH, UA 7.0      Spec Grav UA 1.030      Protein, UA Negative      Glucose, UA 4+ (*)     Ketones, UA Negative      Bilirubin, UA Negative      Blood, UA Negative      Nitrites, UA Negative      Urobilinogen, UA Negative      Leukocyte Esterase, UA Negative     CBC WITH DIFFERENTIAL - Abnormal    WBC 7.64      RBC 4.72      HGB 13.0      HCT 42.8      MCV 91      MCH 27.5      MCHC 30.4 (*)     RDW 14.1      Platelet Count 268      MPV 10.4      Nucleated RBC 0      Neut % 54.8      Lymph % 35.5      Mono % 7.3      Eos % 1.0      Basophil % 0.7      Imm Grans % 0.7 (*)     Neut # 4.19      Lymph # 2.71      Mono # 0.56      Eos # 0.08      Baso # 0.05      Imm Grans # 0.05 (*)    B-TYPE NATRIURETIC PEPTIDE - Abnormal     (*)    TROPONIN I - Normal    Troponin-I <0.006     CBC W/ AUTO DIFFERENTIAL    Narrative:     The following orders were created for panel order CBC auto differential.  Procedure                               Abnormality         Status                     ---------                               -----------         ------                     CBC with Differential[[id number]]       Abnormal            Final result                 Please view results for these tests on the individual orders.   URINALYSIS MICROSCOPIC    RBC, UA 1      WBC, UA 2      Bacteria, UA None      Yeast, UA None      Squamous Epithelial Cells, UA 1      Hyaline Casts, UA 0      Microscopic Comment       GREY TOP URINE HOLD   POCT GLUCOSE    POCT Glucose 107        EKG Readings: (Independently Interpreted)     Chronic atrial fibrillation, rate between 70-80 bpm  FL interval normal, between 120-200 ms  QRS complex normal, <120 ms  QTc < 1/2 of preceding R-R interval  Normal R-wave progression across the precordium  No KORINA within distribution of a coronary artery or reciprocal changes         Imaging Results              X-Ray Chest AP Portable (Final result)  Result time 07/08/25 13:57:56      Final result by Reese Pat MD (07/08/25 13:57:56)                   Impression:      No acute radiographic findings on single view of the chest.    Small nodule in the right upper lung zone.  Recommend follow-up outpatient chest CT for further evaluation.      Electronically signed by: Reese Pat MD  Date:    07/08/2025  Time:    13:57               Narrative:    EXAMINATION:  XR CHEST AP PORTABLE    CLINICAL HISTORY:  Shortness of breath    TECHNIQUE:  Single frontal view of the chest was performed.    COMPARISON:  02/11/2025    FINDINGS:  Cardiac silhouette is stable in size.  There are aortic calcifications.  No confluent airspace consolidation or pneumothorax.  No large pleural effusion.  1.7 cm small round opacity projects over the right upper lung zone.  Small calcified nodules likely granulomas.  Osseous structures show degenerative changes.                                       Medications   furosemide injection 40 mg (40 mg Intravenous Given 7/8/25 1530)     Medical Decision Making  History of CHF and PVD.  Retaining fluid over the past 3 weeks.  Dyspnea only with exertion.  Last completed Lasix 4 months ago with her cardiologist for similar presentation per chart review.  Workup in the ED essentially normal.  No hepatorenal or significant cardiac dysfunction today.  Acute on chronic atrial fibrillation at a normal rate.  On anticoagulant.  No active cardiopulmonary symptoms.  No anemia or signs of infectious process.  No significant electrolyte or metabolic  dysfunction.  She does have edema to lower extremities.  Started on Lasix in the ED.  Will send home with short course of Lasix.  Recommending close cardiology follow-up.  I have discussed this with her daughters.    Risk  Prescription drug management.                                      Clinical Impression:  Final diagnoses:  [R91.1] Nodule of right lung  [R60.9] Fluid retention (Primary)  [I73.9] PVD (peripheral vascular disease)  [I48.20] Chronic atrial fibrillation  [I50.22] Chronic systolic congestive heart failure          ED Disposition Condition    Discharge Stable          ED Prescriptions       Medication Sig Dispense Start Date End Date Auth. Provider    furosemide (LASIX) 40 MG tablet Take 1 tablet (40 mg total) by mouth once daily. for 3 days 3 tablet 7/8/2025 7/11/2025 Brian Alba PA-C          Follow-up Information       Follow up With Specialties Details Why Contact Info    Pedrito Cunningham MD Pediatrics Schedule an appointment as soon as possible for a visit in 1 day For re-evaluation [address] [facility] [address] [facility] [place] [address]  [phone]      [facility] - Cardiology Cardiology Schedule an appointment as soon as possible for a visit in 1 day For further evaluation of cardiac issues [address] [address] [phone]    [facility] Emergency Dept Emergency Medicine Go to  If symptoms worsen or new symptoms develop [address] [facility] [place] [id number] [phone]                 Brian Alba PA-C  07/08/25 1617         [1]   Social History  Tobacco Use    Smoking status: Never    Smokeless tobacco: Never   Substance Use Topics    Alcohol use: Never    Drug use: Never        Brian Alba PA-C  07/08/25 2007

## 2025-07-09 LAB — HOLD SPECIMEN: NORMAL

## 2025-07-10 LAB
OHS QRS DURATION: 78 MS
OHS QTC CALCULATION: 412 MS

## (undated) DEVICE — PAD DEFIB CADENCE ADULT R2